# Patient Record
Sex: FEMALE | Race: BLACK OR AFRICAN AMERICAN | Employment: OTHER | URBAN - METROPOLITAN AREA
[De-identification: names, ages, dates, MRNs, and addresses within clinical notes are randomized per-mention and may not be internally consistent; named-entity substitution may affect disease eponyms.]

---

## 2017-01-16 PROBLEM — K21.9 GASTROESOPHAGEAL REFLUX DISEASE WITHOUT ESOPHAGITIS: Status: ACTIVE | Noted: 2017-01-16

## 2017-01-16 PROBLEM — F41.9 ANXIETY: Status: ACTIVE | Noted: 2017-01-16

## 2017-01-16 PROBLEM — I27.20 PULMONARY HYPERTENSION (HCC): Status: ACTIVE | Noted: 2017-01-16

## 2017-01-16 PROBLEM — I51.7 LVH (LEFT VENTRICULAR HYPERTROPHY): Status: ACTIVE | Noted: 2017-01-16

## 2017-01-16 PROBLEM — I50.9 CONGESTIVE HEART FAILURE, NYHA CLASS 3 AND ACC/AHA STAGE C (HCC): Status: ACTIVE | Noted: 2017-01-16

## 2017-01-16 PROBLEM — I38 VALVULAR HEART DISEASE: Status: ACTIVE | Noted: 2017-01-16

## 2017-02-10 PROBLEM — E11.3299 BDR (BACKGROUND DIABETIC RETINOPATHY) (HCC): Status: ACTIVE | Noted: 2017-02-10

## 2017-02-14 PROBLEM — I51.7 LEFT ATRIAL DILATION: Status: ACTIVE | Noted: 2017-02-14

## 2017-02-14 PROBLEM — R80.9 ALBUMINURIA: Status: ACTIVE | Noted: 2017-02-14

## 2017-03-03 PROBLEM — M17.0 PRIMARY OSTEOARTHRITIS OF BOTH KNEES: Status: ACTIVE | Noted: 2017-03-03

## 2017-05-09 PROBLEM — R15.9 FECAL INCONTINENCE: Status: ACTIVE | Noted: 2017-05-09

## 2017-05-11 PROBLEM — M15.9 PRIMARY OSTEOARTHRITIS INVOLVING MULTIPLE JOINTS: Status: ACTIVE | Noted: 2017-05-11

## 2017-08-22 PROBLEM — E11.9 DIABETES MELLITUS, TYPE II, INSULIN DEPENDENT (HCC): Status: ACTIVE | Noted: 2017-08-22

## 2017-08-22 PROBLEM — N39.0 UTI (URINARY TRACT INFECTION): Status: ACTIVE | Noted: 2017-08-22

## 2017-08-22 PROBLEM — R42 DIZZINESS: Status: ACTIVE | Noted: 2017-08-22

## 2017-08-22 PROBLEM — Z79.4 DIABETES MELLITUS, TYPE II, INSULIN DEPENDENT (HCC): Status: ACTIVE | Noted: 2017-08-22

## 2017-08-22 PROBLEM — I50.9 CHF, ACUTE ON CHRONIC (HCC): Status: ACTIVE | Noted: 2017-08-22

## 2017-08-22 PROBLEM — N18.9 CHRONIC KIDNEY DISEASE: Status: ACTIVE | Noted: 2017-08-22

## 2017-08-22 PROBLEM — Z87.09 HISTORY OF ASTHMA: Status: ACTIVE | Noted: 2017-08-22

## 2018-02-06 PROBLEM — R06.09 DYSPNEA ON EXERTION: Status: ACTIVE | Noted: 2018-02-06

## 2018-02-06 PROBLEM — I50.43 CHF (CONGESTIVE HEART FAILURE), NYHA CLASS I, ACUTE ON CHRONIC, COMBINED (HCC): Status: ACTIVE | Noted: 2018-02-06

## 2018-03-13 ENCOUNTER — TELEPHONE (OUTPATIENT)
Dept: ENT CLINIC | Age: 73
End: 2018-03-13

## 2018-03-14 ENCOUNTER — CARE COORDINATION (OUTPATIENT)
Dept: CARE COORDINATION | Age: 73
End: 2018-03-14

## 2018-03-22 ENCOUNTER — CARE COORDINATION (OUTPATIENT)
Dept: CARE COORDINATION | Age: 73
End: 2018-03-22

## 2018-03-26 ENCOUNTER — HOSPITAL ENCOUNTER (OUTPATIENT)
Dept: OTHER | Age: 73
Setting detail: THERAPIES SERIES
Discharge: HOME | End: 2018-03-26
Payer: COMMERCIAL

## 2018-03-26 VITALS
RESPIRATION RATE: 18 BRPM | DIASTOLIC BLOOD PRESSURE: 56 MMHG | SYSTOLIC BLOOD PRESSURE: 126 MMHG | HEART RATE: 88 BPM | WEIGHT: 230 LBS | BODY MASS INDEX: 40.1 KG/M2

## 2018-03-26 LAB
ANION GAP SERPL CALCULATED.3IONS-SCNC: 14 MMOL/L (ref 7–16)
BUN BLDV-MCNC: 106 MG/DL (ref 8–23)
CALCIUM SERPL-MCNC: 9.3 MG/DL (ref 8.6–10.2)
CHLORIDE BLD-SCNC: 98 MMOL/L (ref 98–107)
CO2: 23 MMOL/L (ref 22–29)
CREAT SERPL-MCNC: 4.5 MG/DL (ref 0.5–1)
GFR AFRICAN AMERICAN: 12
GFR NON-AFRICAN AMERICAN: 12 ML/MIN/1.73
GLUCOSE BLD-MCNC: 252 MG/DL (ref 74–109)
POTASSIUM SERPL-SCNC: 6.2 MMOL/L (ref 3.5–5)
PRO-BNP: 1763 PG/ML (ref 0–125)
SODIUM BLD-SCNC: 135 MMOL/L (ref 132–146)

## 2018-03-26 PROCEDURE — 36415 COLL VENOUS BLD VENIPUNCTURE: CPT

## 2018-03-26 PROCEDURE — 83880 ASSAY OF NATRIURETIC PEPTIDE: CPT

## 2018-03-26 PROCEDURE — 99204 OFFICE O/P NEW MOD 45 MIN: CPT

## 2018-03-26 PROCEDURE — 80048 BASIC METABOLIC PNL TOTAL CA: CPT

## 2018-03-27 ENCOUNTER — TELEPHONE (OUTPATIENT)
Dept: CARDIOLOGY CLINIC | Age: 73
End: 2018-03-27

## 2018-03-27 ENCOUNTER — OFFICE VISIT (OUTPATIENT)
Dept: CARDIOLOGY CLINIC | Age: 73
End: 2018-03-27
Payer: COMMERCIAL

## 2018-03-27 VITALS
HEIGHT: 63 IN | HEART RATE: 88 BPM | DIASTOLIC BLOOD PRESSURE: 60 MMHG | WEIGHT: 230 LBS | OXYGEN SATURATION: 97 % | RESPIRATION RATE: 12 BRPM | BODY MASS INDEX: 40.75 KG/M2 | SYSTOLIC BLOOD PRESSURE: 126 MMHG

## 2018-03-27 DIAGNOSIS — E87.5 HYPERKALEMIA: Primary | ICD-10-CM

## 2018-03-27 DIAGNOSIS — I48.20 CHRONIC ATRIAL FIBRILLATION (HCC): Chronic | ICD-10-CM

## 2018-03-27 DIAGNOSIS — I25.10 CORONARY ARTERY DISEASE INVOLVING NATIVE CORONARY ARTERY OF NATIVE HEART WITHOUT ANGINA PECTORIS: Chronic | ICD-10-CM

## 2018-03-27 PROCEDURE — G8417 CALC BMI ABV UP PARAM F/U: HCPCS | Performed by: INTERNAL MEDICINE

## 2018-03-27 PROCEDURE — 1036F TOBACCO NON-USER: CPT | Performed by: INTERNAL MEDICINE

## 2018-03-27 PROCEDURE — 3017F COLORECTAL CA SCREEN DOC REV: CPT | Performed by: INTERNAL MEDICINE

## 2018-03-27 PROCEDURE — 1123F ACP DISCUSS/DSCN MKR DOCD: CPT | Performed by: INTERNAL MEDICINE

## 2018-03-27 PROCEDURE — G8482 FLU IMMUNIZE ORDER/ADMIN: HCPCS | Performed by: INTERNAL MEDICINE

## 2018-03-27 PROCEDURE — G8427 DOCREV CUR MEDS BY ELIG CLIN: HCPCS | Performed by: INTERNAL MEDICINE

## 2018-03-27 PROCEDURE — 93000 ELECTROCARDIOGRAM COMPLETE: CPT | Performed by: INTERNAL MEDICINE

## 2018-03-27 PROCEDURE — G8399 PT W/DXA RESULTS DOCUMENT: HCPCS | Performed by: INTERNAL MEDICINE

## 2018-03-27 PROCEDURE — 99214 OFFICE O/P EST MOD 30 MIN: CPT | Performed by: INTERNAL MEDICINE

## 2018-03-27 PROCEDURE — 4040F PNEUMOC VAC/ADMIN/RCVD: CPT | Performed by: INTERNAL MEDICINE

## 2018-03-27 PROCEDURE — 3014F SCREEN MAMMO DOC REV: CPT | Performed by: INTERNAL MEDICINE

## 2018-03-27 PROCEDURE — G8598 ASA/ANTIPLAT THER USED: HCPCS | Performed by: INTERNAL MEDICINE

## 2018-03-27 PROCEDURE — 1090F PRES/ABSN URINE INCON ASSESS: CPT | Performed by: INTERNAL MEDICINE

## 2018-03-27 RX ORDER — MAGNESIUM HYDROXIDE/ALUMINUM HYDROXICE/SIMETHICONE 120; 1200; 1200 MG/30ML; MG/30ML; MG/30ML
5 SUSPENSION ORAL EVERY 6 HOURS PRN
COMMUNITY
End: 2019-04-05

## 2018-03-27 RX ORDER — ZOLPIDEM TARTRATE 5 MG/1
5 TABLET ORAL NIGHTLY PRN
COMMUNITY
End: 2019-01-03

## 2018-03-28 ENCOUNTER — TELEPHONE (OUTPATIENT)
Dept: CARDIOLOGY CLINIC | Age: 73
End: 2018-03-28

## 2018-03-28 NOTE — TELEPHONE ENCOUNTER
Dena Simon 8/[birth year]1815 [id number] was seen in [facility] [place] [facility] office. Diuretics held. 1. Discontinue bumetanide. 2. Discontinue spironolactone. To be seen in CHF Clinic Thursday for [facility] lab draw and call office with results. Client was instructed to increase fluids to 2-3 liters daily. Last labs as follows:   Results for Pedro Garrison (MRN [id number]) as of 3/28/2018 10:43   Ref.  Range 3/8/2018 13:10 3/26/2018 13:00   Sodium Latest Ref Range: 132 - 146 mmol/L 132 135   Potassium Latest Ref Range: 3.5 - 5.0 mmol/L 5.7 (H) 6.2 (H)   Chloride Latest Ref Range: 98 - 107 mmol/L 94 (L) 98   CO2 Latest Ref Range: 22 - 29 mmol/L 23 23   BUN Latest Ref Range: 8 - 23 mg/dL 87 (H) 106 (H)   Creatinine Latest Ref Range: 0.5 - 1.0 mg/dL 3.0 (H) 4.5 (H)   Anion Gap Latest Ref Range: 7 - 16 mmol/L 15 14   GFR Non- Latest Ref Range: >=60 mL/min/1.73 18 12   GFR  Unknown 18 12   Glucose Latest Ref Range: 74 - 109 mg/dL 391 (H) 252 (H)   Calcium Latest Ref Range: 8.6 - 10.2 mg/dL 9.3 9.3   Pro-BNP Latest Ref Range: 0 - 125 pg/mL 4,507 (H) 1,763 (H)

## 2018-03-29 ENCOUNTER — CARE COORDINATION (OUTPATIENT)
Dept: CARE COORDINATION | Age: 73
End: 2018-03-29

## 2018-04-02 ENCOUNTER — OFFICE VISIT (OUTPATIENT)
Dept: CARDIOLOGY CLINIC | Age: 73
End: 2018-04-02
Payer: COMMERCIAL

## 2018-04-02 VITALS
WEIGHT: 236 LBS | DIASTOLIC BLOOD PRESSURE: 70 MMHG | RESPIRATION RATE: 18 BRPM | BODY MASS INDEX: 40.29 KG/M2 | OXYGEN SATURATION: 99 % | HEART RATE: 91 BPM | SYSTOLIC BLOOD PRESSURE: 118 MMHG | HEIGHT: 64 IN

## 2018-04-02 DIAGNOSIS — I10 ESSENTIAL HYPERTENSION: ICD-10-CM

## 2018-04-02 DIAGNOSIS — R42 DIZZINESS: ICD-10-CM

## 2018-04-02 DIAGNOSIS — I25.10 CORONARY ARTERY DISEASE INVOLVING NATIVE HEART WITHOUT ANGINA PECTORIS, UNSPECIFIED VESSEL OR LESION TYPE: ICD-10-CM

## 2018-04-02 DIAGNOSIS — N17.9 AKI (ACUTE KIDNEY INJURY) (HCC): Primary | ICD-10-CM

## 2018-04-02 DIAGNOSIS — I50.30 (HFPEF) HEART FAILURE WITH PRESERVED EJECTION FRACTION (HCC): ICD-10-CM

## 2018-04-02 PROCEDURE — 3014F SCREEN MAMMO DOC REV: CPT | Performed by: INTERNAL MEDICINE

## 2018-04-02 PROCEDURE — G8598 ASA/ANTIPLAT THER USED: HCPCS | Performed by: INTERNAL MEDICINE

## 2018-04-02 PROCEDURE — 1090F PRES/ABSN URINE INCON ASSESS: CPT | Performed by: INTERNAL MEDICINE

## 2018-04-02 PROCEDURE — 1036F TOBACCO NON-USER: CPT | Performed by: INTERNAL MEDICINE

## 2018-04-02 PROCEDURE — 99215 OFFICE O/P EST HI 40 MIN: CPT | Performed by: INTERNAL MEDICINE

## 2018-04-02 PROCEDURE — G8399 PT W/DXA RESULTS DOCUMENT: HCPCS | Performed by: INTERNAL MEDICINE

## 2018-04-02 PROCEDURE — 3017F COLORECTAL CA SCREEN DOC REV: CPT | Performed by: INTERNAL MEDICINE

## 2018-04-02 PROCEDURE — 1123F ACP DISCUSS/DSCN MKR DOCD: CPT | Performed by: INTERNAL MEDICINE

## 2018-04-02 PROCEDURE — G8427 DOCREV CUR MEDS BY ELIG CLIN: HCPCS | Performed by: INTERNAL MEDICINE

## 2018-04-02 PROCEDURE — 4040F PNEUMOC VAC/ADMIN/RCVD: CPT | Performed by: INTERNAL MEDICINE

## 2018-04-02 PROCEDURE — G8417 CALC BMI ABV UP PARAM F/U: HCPCS | Performed by: INTERNAL MEDICINE

## 2018-04-02 RX ORDER — MECLIZINE HCL 12.5 MG/1
12.5 TABLET ORAL 3 TIMES DAILY PRN
COMMUNITY
End: 2018-04-02

## 2018-04-02 RX ORDER — SACCHAROMYCES BOULARDII 250 MG
250 CAPSULE ORAL DAILY
COMMUNITY
End: 2019-04-15

## 2018-04-02 RX ORDER — HYDRALAZINE HYDROCHLORIDE 50 MG/1
TABLET, FILM COATED ORAL
Qty: 180 TABLET | Refills: 5 | Status: SHIPPED | OUTPATIENT
Start: 2018-04-02 | End: 2018-05-01 | Stop reason: SDUPTHER

## 2018-04-04 ENCOUNTER — CARE COORDINATION (OUTPATIENT)
Dept: CARE COORDINATION | Age: 73
End: 2018-04-04

## 2018-04-09 ENCOUNTER — HOSPITAL ENCOUNTER (OUTPATIENT)
Dept: ULTRASOUND IMAGING | Age: 73
Discharge: HOME | End: 2018-04-11
Payer: COMMERCIAL

## 2018-04-09 DIAGNOSIS — E04.1 THYROID NODULE: ICD-10-CM

## 2018-04-09 PROCEDURE — 76536 US EXAM OF HEAD AND NECK: CPT

## 2018-04-10 ENCOUNTER — HOSPITAL ENCOUNTER (OUTPATIENT)
Dept: OTHER | Age: 73
Discharge: HOME | End: 2018-04-10

## 2018-04-12 ENCOUNTER — CARE COORDINATION (OUTPATIENT)
Dept: CARE COORDINATION | Age: 73
End: 2018-04-12

## 2018-04-12 ENCOUNTER — HOSPITAL ENCOUNTER (OUTPATIENT)
Dept: OTHER | Age: 73
Setting detail: THERAPIES SERIES
Discharge: HOME | End: 2018-04-12
Payer: COMMERCIAL

## 2018-04-12 VITALS
SYSTOLIC BLOOD PRESSURE: 173 MMHG | BODY MASS INDEX: 42.63 KG/M2 | HEART RATE: 78 BPM | DIASTOLIC BLOOD PRESSURE: 85 MMHG | RESPIRATION RATE: 18 BRPM | WEIGHT: 244.5 LBS

## 2018-04-12 LAB
ANION GAP SERPL CALCULATED.3IONS-SCNC: 15 MMOL/L (ref 7–16)
BUN BLDV-MCNC: 23 MG/DL (ref 8–23)
CALCIUM SERPL-MCNC: 9 MG/DL (ref 8.6–10.2)
CHLORIDE BLD-SCNC: 107 MMOL/L (ref 98–107)
CO2: 23 MMOL/L (ref 22–29)
CREAT SERPL-MCNC: 1.7 MG/DL (ref 0.5–1)
GFR AFRICAN AMERICAN: 36
GFR NON-AFRICAN AMERICAN: 36 ML/MIN/1.73
GLUCOSE BLD-MCNC: 83 MG/DL (ref 74–109)
POTASSIUM SERPL-SCNC: 4.7 MMOL/L (ref 3.5–5)
PRO-BNP: 6324 PG/ML (ref 0–125)
SODIUM BLD-SCNC: 145 MMOL/L (ref 132–146)

## 2018-04-12 PROCEDURE — 99204 OFFICE O/P NEW MOD 45 MIN: CPT

## 2018-04-12 PROCEDURE — 80048 BASIC METABOLIC PNL TOTAL CA: CPT

## 2018-04-12 PROCEDURE — 83880 ASSAY OF NATRIURETIC PEPTIDE: CPT

## 2018-04-12 PROCEDURE — 36415 COLL VENOUS BLD VENIPUNCTURE: CPT

## 2018-04-16 ENCOUNTER — OFFICE VISIT (OUTPATIENT)
Dept: ENT CLINIC | Age: 73
End: 2018-04-16
Payer: COMMERCIAL

## 2018-04-16 VITALS
DIASTOLIC BLOOD PRESSURE: 72 MMHG | SYSTOLIC BLOOD PRESSURE: 127 MMHG | BODY MASS INDEX: 41.66 KG/M2 | WEIGHT: 244 LBS | OXYGEN SATURATION: 99 % | HEART RATE: 102 BPM | HEIGHT: 64 IN

## 2018-04-16 DIAGNOSIS — E04.1 THYROID NODULE: Primary | ICD-10-CM

## 2018-04-16 PROCEDURE — 1036F TOBACCO NON-USER: CPT | Performed by: OTOLARYNGOLOGY

## 2018-04-16 PROCEDURE — G8417 CALC BMI ABV UP PARAM F/U: HCPCS | Performed by: OTOLARYNGOLOGY

## 2018-04-16 PROCEDURE — 1123F ACP DISCUSS/DSCN MKR DOCD: CPT | Performed by: OTOLARYNGOLOGY

## 2018-04-16 PROCEDURE — 99214 OFFICE O/P EST MOD 30 MIN: CPT | Performed by: OTOLARYNGOLOGY

## 2018-04-16 PROCEDURE — 3017F COLORECTAL CA SCREEN DOC REV: CPT | Performed by: OTOLARYNGOLOGY

## 2018-04-16 PROCEDURE — G8598 ASA/ANTIPLAT THER USED: HCPCS | Performed by: OTOLARYNGOLOGY

## 2018-04-16 PROCEDURE — 1090F PRES/ABSN URINE INCON ASSESS: CPT | Performed by: OTOLARYNGOLOGY

## 2018-04-16 PROCEDURE — G8399 PT W/DXA RESULTS DOCUMENT: HCPCS | Performed by: OTOLARYNGOLOGY

## 2018-04-16 PROCEDURE — G8427 DOCREV CUR MEDS BY ELIG CLIN: HCPCS | Performed by: OTOLARYNGOLOGY

## 2018-04-16 PROCEDURE — 4040F PNEUMOC VAC/ADMIN/RCVD: CPT | Performed by: OTOLARYNGOLOGY

## 2018-04-18 ENCOUNTER — CARE COORDINATION (OUTPATIENT)
Dept: CARE COORDINATION | Age: 73
End: 2018-04-18

## 2018-04-18 ENCOUNTER — TELEPHONE (OUTPATIENT)
Dept: CARDIOLOGY CLINIC | Age: 73
End: 2018-04-18

## 2018-04-18 DIAGNOSIS — I50.23 ACUTE ON CHRONIC SYSTOLIC CONGESTIVE HEART FAILURE (HCC): Primary | ICD-10-CM

## 2018-04-18 RX ORDER — BUMETANIDE 2 MG/1
2 TABLET ORAL DAILY
Qty: 30 TABLET | Refills: 3
Start: 2018-04-18 | End: 2019-02-15 | Stop reason: SDUPTHER

## 2018-04-23 ENCOUNTER — HOSPITAL ENCOUNTER (OUTPATIENT)
Dept: OTHER | Age: 73
Setting detail: THERAPIES SERIES
Discharge: HOME | End: 2018-04-23
Payer: COMMERCIAL

## 2018-04-23 VITALS
RESPIRATION RATE: 18 BRPM | SYSTOLIC BLOOD PRESSURE: 174 MMHG | WEIGHT: 242 LBS | DIASTOLIC BLOOD PRESSURE: 87 MMHG | HEART RATE: 88 BPM | BODY MASS INDEX: 42.2 KG/M2

## 2018-04-23 LAB
ANION GAP SERPL CALCULATED.3IONS-SCNC: 11 MMOL/L (ref 7–16)
BUN BLDV-MCNC: 20 MG/DL (ref 8–23)
CALCIUM SERPL-MCNC: 8.7 MG/DL (ref 8.6–10.2)
CHLORIDE BLD-SCNC: 104 MMOL/L (ref 98–107)
CO2: 29 MMOL/L (ref 22–29)
CREAT SERPL-MCNC: 1.5 MG/DL (ref 0.5–1)
GFR AFRICAN AMERICAN: 41
GFR NON-AFRICAN AMERICAN: 41 ML/MIN/1.73
GLUCOSE BLD-MCNC: 197 MG/DL (ref 74–109)
POTASSIUM SERPL-SCNC: 4.7 MMOL/L (ref 3.5–5)
PRO-BNP: 4629 PG/ML (ref 0–125)
SODIUM BLD-SCNC: 144 MMOL/L (ref 132–146)

## 2018-04-23 PROCEDURE — 83880 ASSAY OF NATRIURETIC PEPTIDE: CPT

## 2018-04-23 PROCEDURE — 80048 BASIC METABOLIC PNL TOTAL CA: CPT

## 2018-04-23 PROCEDURE — 36415 COLL VENOUS BLD VENIPUNCTURE: CPT

## 2018-04-23 PROCEDURE — 99214 OFFICE O/P EST MOD 30 MIN: CPT

## 2018-04-24 ENCOUNTER — TELEPHONE (OUTPATIENT)
Dept: CARDIOLOGY CLINIC | Age: 73
End: 2018-04-24

## 2018-04-27 ASSESSMENT — ENCOUNTER SYMPTOMS
DOUBLE VISION: 0
SORE THROAT: 0
SHORTNESS OF BREATH: 0
VOMITING: 0
SINUS PAIN: 0
BLURRED VISION: 0
COUGH: 0
HEARTBURN: 0

## 2018-05-01 ENCOUNTER — OFFICE VISIT (OUTPATIENT)
Dept: CARDIOLOGY CLINIC | Age: 73
End: 2018-05-01
Payer: COMMERCIAL

## 2018-05-01 VITALS
HEIGHT: 64 IN | WEIGHT: 238.8 LBS | SYSTOLIC BLOOD PRESSURE: 120 MMHG | BODY MASS INDEX: 40.77 KG/M2 | DIASTOLIC BLOOD PRESSURE: 64 MMHG | RESPIRATION RATE: 16 BRPM | HEART RATE: 76 BPM | OXYGEN SATURATION: 97 %

## 2018-05-01 DIAGNOSIS — I50.32 CHRONIC DIASTOLIC HEART FAILURE (HCC): Primary | ICD-10-CM

## 2018-05-01 PROCEDURE — 99214 OFFICE O/P EST MOD 30 MIN: CPT | Performed by: INTERNAL MEDICINE

## 2018-05-01 PROCEDURE — 1123F ACP DISCUSS/DSCN MKR DOCD: CPT | Performed by: INTERNAL MEDICINE

## 2018-05-01 PROCEDURE — 4040F PNEUMOC VAC/ADMIN/RCVD: CPT | Performed by: INTERNAL MEDICINE

## 2018-05-01 PROCEDURE — 3017F COLORECTAL CA SCREEN DOC REV: CPT | Performed by: INTERNAL MEDICINE

## 2018-05-01 PROCEDURE — G8598 ASA/ANTIPLAT THER USED: HCPCS | Performed by: INTERNAL MEDICINE

## 2018-05-01 PROCEDURE — G8399 PT W/DXA RESULTS DOCUMENT: HCPCS | Performed by: INTERNAL MEDICINE

## 2018-05-01 PROCEDURE — G8417 CALC BMI ABV UP PARAM F/U: HCPCS | Performed by: INTERNAL MEDICINE

## 2018-05-01 PROCEDURE — G8427 DOCREV CUR MEDS BY ELIG CLIN: HCPCS | Performed by: INTERNAL MEDICINE

## 2018-05-01 PROCEDURE — 1090F PRES/ABSN URINE INCON ASSESS: CPT | Performed by: INTERNAL MEDICINE

## 2018-05-01 PROCEDURE — 1036F TOBACCO NON-USER: CPT | Performed by: INTERNAL MEDICINE

## 2018-05-01 RX ORDER — DOCUSATE SODIUM 100 MG/1
100 CAPSULE, LIQUID FILLED ORAL DAILY PRN
COMMUNITY
End: 2019-04-15

## 2018-05-01 RX ORDER — HYDRALAZINE HYDROCHLORIDE 50 MG/1
50 TABLET, FILM COATED ORAL 3 TIMES DAILY
Qty: 180 TABLET | Refills: 5 | Status: SHIPPED | OUTPATIENT
Start: 2018-05-01 | End: 2018-08-01 | Stop reason: ALTCHOICE

## 2018-05-10 ENCOUNTER — HOSPITAL ENCOUNTER (OUTPATIENT)
Dept: OTHER | Age: 73
Setting detail: THERAPIES SERIES
Discharge: HOME | End: 2018-05-10
Payer: MEDICARE

## 2018-05-10 VITALS
HEART RATE: 90 BPM | BODY MASS INDEX: 41.15 KG/M2 | DIASTOLIC BLOOD PRESSURE: 76 MMHG | WEIGHT: 236 LBS | RESPIRATION RATE: 18 BRPM | SYSTOLIC BLOOD PRESSURE: 131 MMHG

## 2018-05-10 LAB
ANION GAP SERPL CALCULATED.3IONS-SCNC: 12 MMOL/L (ref 7–16)
BUN BLDV-MCNC: 27 MG/DL (ref 8–23)
CALCIUM SERPL-MCNC: 9.2 MG/DL (ref 8.6–10.2)
CHLORIDE BLD-SCNC: 103 MMOL/L (ref 98–107)
CO2: 29 MMOL/L (ref 22–29)
CREAT SERPL-MCNC: 1.6 MG/DL (ref 0.5–1)
GFR AFRICAN AMERICAN: 38
GFR NON-AFRICAN AMERICAN: 38 ML/MIN/1.73
GLUCOSE BLD-MCNC: 161 MG/DL (ref 74–109)
POTASSIUM SERPL-SCNC: 4.3 MMOL/L (ref 3.5–5)
PRO-BNP: 9337 PG/ML (ref 0–125)
SODIUM BLD-SCNC: 144 MMOL/L (ref 132–146)

## 2018-05-10 PROCEDURE — 83880 ASSAY OF NATRIURETIC PEPTIDE: CPT

## 2018-05-10 PROCEDURE — 80048 BASIC METABOLIC PNL TOTAL CA: CPT

## 2018-05-10 PROCEDURE — 36415 COLL VENOUS BLD VENIPUNCTURE: CPT

## 2018-05-10 PROCEDURE — 99214 OFFICE O/P EST MOD 30 MIN: CPT

## 2018-05-11 ENCOUNTER — TELEPHONE (OUTPATIENT)
Dept: CARDIOLOGY CLINIC | Age: 73
End: 2018-05-11

## 2018-05-16 ENCOUNTER — TELEPHONE (OUTPATIENT)
Dept: CARDIOLOGY CLINIC | Age: 73
End: 2018-05-16

## 2018-05-16 ENCOUNTER — HOSPITAL ENCOUNTER (OUTPATIENT)
Dept: OTHER | Age: 73
Setting detail: THERAPIES SERIES
Discharge: HOME | End: 2018-05-16
Payer: MEDICARE

## 2018-05-16 VITALS
SYSTOLIC BLOOD PRESSURE: 134 MMHG | RESPIRATION RATE: 18 BRPM | DIASTOLIC BLOOD PRESSURE: 64 MMHG | WEIGHT: 238 LBS | BODY MASS INDEX: 41.5 KG/M2 | HEART RATE: 89 BPM

## 2018-05-16 LAB
ANION GAP SERPL CALCULATED.3IONS-SCNC: 13 MMOL/L (ref 7–16)
BUN BLDV-MCNC: 28 MG/DL (ref 8–23)
CALCIUM SERPL-MCNC: 8.8 MG/DL (ref 8.6–10.2)
CHLORIDE BLD-SCNC: 102 MMOL/L (ref 98–107)
CO2: 26 MMOL/L (ref 22–29)
CREAT SERPL-MCNC: 1.5 MG/DL (ref 0.5–1)
GFR AFRICAN AMERICAN: 41
GFR NON-AFRICAN AMERICAN: 41 ML/MIN/1.73
GLUCOSE BLD-MCNC: 106 MG/DL (ref 74–109)
POTASSIUM SERPL-SCNC: 3.8 MMOL/L (ref 3.5–5)
PRO-BNP: 2870 PG/ML (ref 0–125)
SODIUM BLD-SCNC: 141 MMOL/L (ref 132–146)

## 2018-05-16 PROCEDURE — 2580000003 HC RX 258: Performed by: INTERNAL MEDICINE

## 2018-05-16 PROCEDURE — 80048 BASIC METABOLIC PNL TOTAL CA: CPT

## 2018-05-16 PROCEDURE — 36415 COLL VENOUS BLD VENIPUNCTURE: CPT

## 2018-05-16 PROCEDURE — 83880 ASSAY OF NATRIURETIC PEPTIDE: CPT

## 2018-05-16 PROCEDURE — 2500000003 HC RX 250 WO HCPCS: Performed by: INTERNAL MEDICINE

## 2018-05-16 PROCEDURE — 96374 THER/PROPH/DIAG INJ IV PUSH: CPT

## 2018-05-16 PROCEDURE — 99214 OFFICE O/P EST MOD 30 MIN: CPT

## 2018-05-16 RX ORDER — BUMETANIDE 0.25 MG/ML
1 INJECTION, SOLUTION INTRAMUSCULAR; INTRAVENOUS ONCE
Status: COMPLETED | OUTPATIENT
Start: 2018-05-16 | End: 2018-05-16

## 2018-05-16 RX ORDER — SODIUM CHLORIDE 0.9 % (FLUSH) 0.9 %
10 SYRINGE (ML) INJECTION PRN
Status: DISCONTINUED | OUTPATIENT
Start: 2018-05-16 | End: 2018-05-17 | Stop reason: HOSPADM

## 2018-05-16 RX ADMIN — Medication 10 ML: at 09:39

## 2018-05-16 RX ADMIN — BUMETANIDE 1 MG: 0.25 INJECTION INTRAMUSCULAR; INTRAVENOUS at 09:39

## 2018-05-18 ENCOUNTER — HOSPITAL ENCOUNTER (OUTPATIENT)
Dept: CARDIAC REHAB | Age: 73
Setting detail: THERAPIES SERIES
Discharge: HOME | End: 2018-05-18
Payer: MEDICARE

## 2018-05-18 VITALS
DIASTOLIC BLOOD PRESSURE: 89 MMHG | SYSTOLIC BLOOD PRESSURE: 147 MMHG | RESPIRATION RATE: 20 BRPM | HEIGHT: 64 IN | BODY MASS INDEX: 40.63 KG/M2 | WEIGHT: 238 LBS | HEART RATE: 76 BPM

## 2018-05-23 ENCOUNTER — HOSPITAL ENCOUNTER (OUTPATIENT)
Dept: CARDIAC REHAB | Age: 73
Setting detail: THERAPIES SERIES
Discharge: HOME | End: 2018-05-23
Payer: MEDICARE

## 2018-05-23 PROCEDURE — 93798 PHYS/QHP OP CAR RHAB W/ECG: CPT

## 2018-05-24 ENCOUNTER — HOSPITAL ENCOUNTER (OUTPATIENT)
Dept: OTHER | Age: 73
Setting detail: THERAPIES SERIES
Discharge: HOME | End: 2018-05-24
Payer: MEDICARE

## 2018-05-24 VITALS
DIASTOLIC BLOOD PRESSURE: 58 MMHG | BODY MASS INDEX: 41.32 KG/M2 | RESPIRATION RATE: 18 BRPM | SYSTOLIC BLOOD PRESSURE: 132 MMHG | HEART RATE: 77 BPM | WEIGHT: 237 LBS

## 2018-05-24 LAB
ANION GAP SERPL CALCULATED.3IONS-SCNC: 17 MMOL/L (ref 7–16)
BUN BLDV-MCNC: 28 MG/DL (ref 8–23)
CALCIUM SERPL-MCNC: 8.8 MG/DL (ref 8.6–10.2)
CHLORIDE BLD-SCNC: 106 MMOL/L (ref 98–107)
CO2: 21 MMOL/L (ref 22–29)
CREAT SERPL-MCNC: 1.6 MG/DL (ref 0.5–1)
GFR AFRICAN AMERICAN: 38
GFR NON-AFRICAN AMERICAN: 38 ML/MIN/1.73
GLUCOSE BLD-MCNC: 89 MG/DL (ref 74–109)
POTASSIUM SERPL-SCNC: 4.3 MMOL/L (ref 3.5–5)
PRO-BNP: 2155 PG/ML (ref 0–125)
SODIUM BLD-SCNC: 144 MMOL/L (ref 132–146)

## 2018-05-24 PROCEDURE — 2580000003 HC RX 258: Performed by: INTERNAL MEDICINE

## 2018-05-24 PROCEDURE — 96374 THER/PROPH/DIAG INJ IV PUSH: CPT

## 2018-05-24 PROCEDURE — 36415 COLL VENOUS BLD VENIPUNCTURE: CPT

## 2018-05-24 PROCEDURE — 2500000003 HC RX 250 WO HCPCS: Performed by: INTERNAL MEDICINE

## 2018-05-24 PROCEDURE — 6360000002 HC RX W HCPCS: Performed by: INTERNAL MEDICINE

## 2018-05-24 PROCEDURE — 80048 BASIC METABOLIC PNL TOTAL CA: CPT

## 2018-05-24 PROCEDURE — 99214 OFFICE O/P EST MOD 30 MIN: CPT

## 2018-05-24 PROCEDURE — 83880 ASSAY OF NATRIURETIC PEPTIDE: CPT

## 2018-05-24 RX ORDER — BUMETANIDE 0.25 MG/ML
1 INJECTION, SOLUTION INTRAMUSCULAR; INTRAVENOUS ONCE
Status: COMPLETED | OUTPATIENT
Start: 2018-05-24 | End: 2018-05-24

## 2018-05-24 RX ORDER — SODIUM CHLORIDE 0.9 % (FLUSH) 0.9 %
10 SYRINGE (ML) INJECTION PRN
Status: DISCONTINUED | OUTPATIENT
Start: 2018-05-24 | End: 2018-05-25 | Stop reason: HOSPADM

## 2018-05-24 RX ORDER — FUROSEMIDE 10 MG/ML
40 INJECTION INTRAMUSCULAR; INTRAVENOUS ONCE
Status: COMPLETED | OUTPATIENT
Start: 2018-05-24 | End: 2018-05-24

## 2018-05-24 RX ADMIN — Medication 10 ML: at 12:23

## 2018-05-24 RX ADMIN — FUROSEMIDE 40 MG: 10 INJECTION, SOLUTION INTRAMUSCULAR; INTRAVENOUS at 12:23

## 2018-05-24 RX ADMIN — BUMETANIDE 1 MG: 0.25 INJECTION INTRAMUSCULAR; INTRAVENOUS at 12:26

## 2018-05-25 ENCOUNTER — HOSPITAL ENCOUNTER (OUTPATIENT)
Dept: CARDIAC REHAB | Age: 73
Setting detail: THERAPIES SERIES
Discharge: HOME | End: 2018-05-25
Payer: MEDICARE

## 2018-05-25 PROCEDURE — 93798 PHYS/QHP OP CAR RHAB W/ECG: CPT

## 2018-05-30 ENCOUNTER — HOSPITAL ENCOUNTER (OUTPATIENT)
Dept: CARDIAC REHAB | Age: 73
Setting detail: THERAPIES SERIES
Discharge: HOME | End: 2018-05-30
Payer: MEDICARE

## 2018-05-30 PROCEDURE — 93798 PHYS/QHP OP CAR RHAB W/ECG: CPT

## 2018-06-01 ENCOUNTER — HOSPITAL ENCOUNTER (OUTPATIENT)
Dept: CARDIAC REHAB | Age: 73
Setting detail: THERAPIES SERIES
Discharge: HOME | End: 2018-06-01
Payer: MEDICARE

## 2018-06-01 PROCEDURE — 93798 PHYS/QHP OP CAR RHAB W/ECG: CPT

## 2018-06-04 ENCOUNTER — HOSPITAL ENCOUNTER (OUTPATIENT)
Dept: CARDIAC REHAB | Age: 73
Setting detail: THERAPIES SERIES
Discharge: HOME | End: 2018-06-04
Payer: MEDICARE

## 2018-06-04 PROCEDURE — 93798 PHYS/QHP OP CAR RHAB W/ECG: CPT

## 2018-06-06 ENCOUNTER — HOSPITAL ENCOUNTER (OUTPATIENT)
Dept: OTHER | Age: 73
Setting detail: THERAPIES SERIES
Discharge: HOME | End: 2018-06-06
Payer: MEDICARE

## 2018-06-08 ENCOUNTER — HOSPITAL ENCOUNTER (OUTPATIENT)
Dept: CARDIAC REHAB | Age: 73
Setting detail: THERAPIES SERIES
Discharge: HOME | End: 2018-06-08
Payer: MEDICARE

## 2018-06-08 PROCEDURE — 93798 PHYS/QHP OP CAR RHAB W/ECG: CPT

## 2018-06-11 ENCOUNTER — HOSPITAL ENCOUNTER (OUTPATIENT)
Dept: CARDIAC REHAB | Age: 73
Setting detail: THERAPIES SERIES
Discharge: HOME | End: 2018-06-11
Payer: MEDICARE

## 2018-06-11 VITALS — WEIGHT: 237.4 LBS | HEIGHT: 64 IN | BODY MASS INDEX: 40.53 KG/M2

## 2018-06-11 PROCEDURE — 93798 PHYS/QHP OP CAR RHAB W/ECG: CPT

## 2018-06-11 NOTE — PROGRESS NOTES
Outpatient Dietitian Assessment  5/35/3423    Caryl Beltran 68 y.o. female    PCP:   Cody Tomas DO    Assessment:  Pt seen and educated; Patient reports she is currently at [facility] and difficulty walking;  does not like the food and orders out one time per week from fast food ( [employer] or [employer]was on fluid restriction in past but not currently;  daughter provides some fruit snacks keeps in room; pt goes to vending machine; appetite good; Pertinent Past Medical/Surgical History: Hypertension, CAD, diabetes, chronic afib, CKD, CHF, arthritis, valvular heart disease, diabetes, dyspnea on exertion, asthma, CABG X 4, stents x 3; no chewing/swallowing problems, allergic to eggs; stated [employer]; no [employer] food restrictions stated;   24 hour dietary recall done and is as followed:   Breakfast: 7:30 am ( at facility) 2 pieces deutsch, 1 mini waffle with butter and diet syrup, 2 oz 2% milk, 2 oz orange juice. AM snack: none  Lunch: none  PM Snack: none  Dinner: 5:00 pm ( take out delivered)  6 plain wings from [employer], 16 ounce diet coke. Evening snack: 7-8 pm ( at facility) 1 cookie and 1 bottle of powdered flavored water  ( adds orange, cherry or grape sugar free flavor packets to bottles water)  Or vending machine: 1 bag regular chips  Beverages:  2-4 ounces orange juice, 1 water pitcher (per shift offered )* intake varies  1 - 12 ounce can diet pop. Snacks: per facility rotation, see comments listed above  No coffee, drinks hot tea per facility; eats in room; has roommate; encouraged to go to food related activities. Ht Readings from Last 1 Encounters:   06/11/18 5' 3.5\" (1.613 m)     Wt Readings from Last 3 Encounters:   06/11/18 237 lb 6.4 oz (107.7 kg)   05/24/18 237 lb (107.5 kg)   05/18/18 238 lb (108 kg)     Body mass index is 41.39 kg/m². Diagnosis:  Obese/Overweight related to excessive fat intake of foods as evident BMI 41.39.      Intervention:  Meet estimated

## 2018-06-15 ENCOUNTER — HOSPITAL ENCOUNTER (OUTPATIENT)
Dept: CARDIAC REHAB | Age: 73
Setting detail: THERAPIES SERIES
Discharge: HOME | End: 2018-06-15
Payer: MEDICARE

## 2018-06-15 PROCEDURE — 93798 PHYS/QHP OP CAR RHAB W/ECG: CPT

## 2018-06-18 ENCOUNTER — HOSPITAL ENCOUNTER (OUTPATIENT)
Dept: CARDIAC REHAB | Age: 73
Setting detail: THERAPIES SERIES
Discharge: HOME | End: 2018-06-18
Payer: MEDICARE

## 2018-06-18 PROCEDURE — 93798 PHYS/QHP OP CAR RHAB W/ECG: CPT

## 2018-06-20 ENCOUNTER — HOSPITAL ENCOUNTER (OUTPATIENT)
Dept: CARDIAC REHAB | Age: 73
Setting detail: THERAPIES SERIES
Discharge: HOME | End: 2018-06-20
Payer: MEDICARE

## 2018-06-20 PROCEDURE — 93798 PHYS/QHP OP CAR RHAB W/ECG: CPT

## 2018-06-21 ENCOUNTER — OFFICE VISIT (OUTPATIENT)
Dept: CARDIOLOGY CLINIC | Age: 73
End: 2018-06-21
Payer: MEDICARE

## 2018-06-21 VITALS
RESPIRATION RATE: 24 BRPM | BODY MASS INDEX: 40.63 KG/M2 | OXYGEN SATURATION: 98 % | SYSTOLIC BLOOD PRESSURE: 112 MMHG | WEIGHT: 238 LBS | HEIGHT: 64 IN | HEART RATE: 87 BPM | DIASTOLIC BLOOD PRESSURE: 58 MMHG

## 2018-06-21 DIAGNOSIS — E66.01 MORBID OBESITY (HCC): ICD-10-CM

## 2018-06-21 DIAGNOSIS — E11.9 DIABETES MELLITUS, TYPE II, INSULIN DEPENDENT (HCC): ICD-10-CM

## 2018-06-21 DIAGNOSIS — Z79.4 DIABETES MELLITUS, TYPE II, INSULIN DEPENDENT (HCC): ICD-10-CM

## 2018-06-21 DIAGNOSIS — I50.33 ACUTE ON CHRONIC DIASTOLIC HEART FAILURE (HCC): Primary | ICD-10-CM

## 2018-06-21 DIAGNOSIS — N18.9 CHRONIC KIDNEY DISEASE, UNSPECIFIED CKD STAGE: ICD-10-CM

## 2018-06-21 DIAGNOSIS — I10 HYPERTENSION, UNSPECIFIED TYPE: ICD-10-CM

## 2018-06-21 DIAGNOSIS — I48.20 CHRONIC ATRIAL FIBRILLATION (HCC): Chronic | ICD-10-CM

## 2018-06-21 DIAGNOSIS — I38 VALVULAR HEART DISEASE: ICD-10-CM

## 2018-06-21 DIAGNOSIS — R06.09 DYSPNEA ON EXERTION: ICD-10-CM

## 2018-06-21 DIAGNOSIS — M19.90 ARTHRITIS: ICD-10-CM

## 2018-06-21 DIAGNOSIS — I34.0 NON-RHEUMATIC MITRAL REGURGITATION: ICD-10-CM

## 2018-06-21 DIAGNOSIS — I25.10 CORONARY ARTERY DISEASE INVOLVING NATIVE CORONARY ARTERY OF NATIVE HEART WITHOUT ANGINA PECTORIS: ICD-10-CM

## 2018-06-21 PROCEDURE — 93000 ELECTROCARDIOGRAM COMPLETE: CPT | Performed by: INTERNAL MEDICINE

## 2018-06-21 PROCEDURE — 1090F PRES/ABSN URINE INCON ASSESS: CPT | Performed by: NURSE PRACTITIONER

## 2018-06-21 PROCEDURE — 3017F COLORECTAL CA SCREEN DOC REV: CPT | Performed by: NURSE PRACTITIONER

## 2018-06-21 PROCEDURE — G8427 DOCREV CUR MEDS BY ELIG CLIN: HCPCS | Performed by: NURSE PRACTITIONER

## 2018-06-21 PROCEDURE — 1036F TOBACCO NON-USER: CPT | Performed by: NURSE PRACTITIONER

## 2018-06-21 PROCEDURE — G8598 ASA/ANTIPLAT THER USED: HCPCS | Performed by: NURSE PRACTITIONER

## 2018-06-21 PROCEDURE — 2022F DILAT RTA XM EVC RTNOPTHY: CPT | Performed by: NURSE PRACTITIONER

## 2018-06-21 PROCEDURE — G8399 PT W/DXA RESULTS DOCUMENT: HCPCS | Performed by: NURSE PRACTITIONER

## 2018-06-21 PROCEDURE — G8417 CALC BMI ABV UP PARAM F/U: HCPCS | Performed by: NURSE PRACTITIONER

## 2018-06-21 PROCEDURE — 1123F ACP DISCUSS/DSCN MKR DOCD: CPT | Performed by: NURSE PRACTITIONER

## 2018-06-21 PROCEDURE — 4040F PNEUMOC VAC/ADMIN/RCVD: CPT | Performed by: NURSE PRACTITIONER

## 2018-06-21 PROCEDURE — 99214 OFFICE O/P EST MOD 30 MIN: CPT | Performed by: NURSE PRACTITIONER

## 2018-06-21 PROCEDURE — 3045F PR MOST RECENT HEMOGLOBIN A1C LEVEL 7.0-9.0%: CPT | Performed by: NURSE PRACTITIONER

## 2018-06-21 RX ORDER — BENZONATATE 100 MG/1
100 CAPSULE ORAL 3 TIMES DAILY PRN
COMMUNITY
End: 2019-04-05

## 2018-06-22 ENCOUNTER — HOSPITAL ENCOUNTER (OUTPATIENT)
Dept: OTHER | Age: 73
Setting detail: THERAPIES SERIES
Discharge: HOME | End: 2018-06-22
Payer: MEDICARE

## 2018-06-22 ENCOUNTER — HOSPITAL ENCOUNTER (OUTPATIENT)
Dept: CARDIAC REHAB | Age: 73
Setting detail: THERAPIES SERIES
Discharge: HOME | End: 2018-06-22
Payer: MEDICARE

## 2018-06-22 LAB
ANION GAP SERPL CALCULATED.3IONS-SCNC: 18 MMOL/L (ref 7–16)
BUN BLDV-MCNC: 45 MG/DL (ref 8–23)
CALCIUM SERPL-MCNC: 8.8 MG/DL (ref 8.6–10.2)
CHLORIDE BLD-SCNC: 102 MMOL/L (ref 98–107)
CO2: 23 MMOL/L (ref 22–29)
CREAT SERPL-MCNC: 2 MG/DL (ref 0.5–1)
GFR AFRICAN AMERICAN: 30
GFR NON-AFRICAN AMERICAN: 30 ML/MIN/1.73
GLUCOSE BLD-MCNC: 130 MG/DL (ref 74–109)
POTASSIUM SERPL-SCNC: 4.3 MMOL/L (ref 3.5–5)
PRO-BNP: 2130 PG/ML (ref 0–125)
SODIUM BLD-SCNC: 143 MMOL/L (ref 132–146)

## 2018-06-22 PROCEDURE — 96374 THER/PROPH/DIAG INJ IV PUSH: CPT

## 2018-06-22 PROCEDURE — 36415 COLL VENOUS BLD VENIPUNCTURE: CPT

## 2018-06-22 PROCEDURE — 99214 OFFICE O/P EST MOD 30 MIN: CPT

## 2018-06-22 PROCEDURE — 2580000003 HC RX 258: Performed by: INTERNAL MEDICINE

## 2018-06-22 PROCEDURE — 80048 BASIC METABOLIC PNL TOTAL CA: CPT

## 2018-06-22 PROCEDURE — 83880 ASSAY OF NATRIURETIC PEPTIDE: CPT

## 2018-06-22 PROCEDURE — 93798 PHYS/QHP OP CAR RHAB W/ECG: CPT

## 2018-06-22 PROCEDURE — 2500000003 HC RX 250 WO HCPCS: Performed by: INTERNAL MEDICINE

## 2018-06-22 RX ORDER — BUMETANIDE 0.25 MG/ML
1 INJECTION, SOLUTION INTRAMUSCULAR; INTRAVENOUS ONCE
Status: COMPLETED | OUTPATIENT
Start: 2018-06-22 | End: 2018-06-22

## 2018-06-22 RX ORDER — SODIUM CHLORIDE 0.9 % (FLUSH) 0.9 %
10 SYRINGE (ML) INJECTION PRN
Status: DISCONTINUED | OUTPATIENT
Start: 2018-06-22 | End: 2018-06-23 | Stop reason: HOSPADM

## 2018-06-22 RX ADMIN — BUMETANIDE 1 MG: 0.25 INJECTION INTRAMUSCULAR; INTRAVENOUS at 14:05

## 2018-06-22 RX ADMIN — Medication 10 ML: at 14:05

## 2018-06-25 ENCOUNTER — HOSPITAL ENCOUNTER (OUTPATIENT)
Dept: CARDIAC REHAB | Age: 73
Setting detail: THERAPIES SERIES
Discharge: HOME | End: 2018-06-25
Payer: MEDICARE

## 2018-06-25 PROCEDURE — 93798 PHYS/QHP OP CAR RHAB W/ECG: CPT

## 2018-06-29 ENCOUNTER — HOSPITAL ENCOUNTER (OUTPATIENT)
Dept: OTHER | Age: 73
Setting detail: THERAPIES SERIES
Discharge: HOME | End: 2018-06-29
Payer: MEDICARE

## 2018-06-29 ENCOUNTER — HOSPITAL ENCOUNTER (OUTPATIENT)
Dept: CARDIAC REHAB | Age: 73
Setting detail: THERAPIES SERIES
Discharge: HOME | End: 2018-06-29
Payer: MEDICARE

## 2018-06-29 PROCEDURE — 93798 PHYS/QHP OP CAR RHAB W/ECG: CPT

## 2018-06-29 RX ORDER — BUMETANIDE 0.25 MG/ML
2 INJECTION, SOLUTION INTRAMUSCULAR; INTRAVENOUS ONCE
Status: DISCONTINUED | OUTPATIENT
Start: 2018-06-29 | End: 2018-07-14 | Stop reason: HOSPADM

## 2018-06-29 RX ORDER — SODIUM CHLORIDE 0.9 % (FLUSH) 0.9 %
10 SYRINGE (ML) INJECTION PRN
Status: DISCONTINUED | OUTPATIENT
Start: 2018-06-29 | End: 2018-07-14 | Stop reason: HOSPADM

## 2018-07-02 ENCOUNTER — HOSPITAL ENCOUNTER (OUTPATIENT)
Dept: CARDIAC REHAB | Age: 73
Setting detail: THERAPIES SERIES
Discharge: HOME | End: 2018-07-02
Payer: MEDICARE

## 2018-07-02 PROCEDURE — 93798 PHYS/QHP OP CAR RHAB W/ECG: CPT

## 2018-07-06 ENCOUNTER — HOSPITAL ENCOUNTER (OUTPATIENT)
Dept: CARDIAC REHAB | Age: 73
Setting detail: THERAPIES SERIES
Discharge: HOME | End: 2018-07-06
Payer: MEDICARE

## 2018-07-06 PROCEDURE — 93798 PHYS/QHP OP CAR RHAB W/ECG: CPT

## 2018-07-09 ENCOUNTER — TELEPHONE (OUTPATIENT)
Dept: CARDIOLOGY CLINIC | Age: 73
End: 2018-07-09

## 2018-07-09 ENCOUNTER — HOSPITAL ENCOUNTER (OUTPATIENT)
Dept: CARDIAC REHAB | Age: 73
Setting detail: THERAPIES SERIES
Discharge: HOME | End: 2018-07-09
Payer: MEDICARE

## 2018-07-09 PROCEDURE — 93798 PHYS/QHP OP CAR RHAB W/ECG: CPT

## 2018-07-13 ENCOUNTER — HOSPITAL ENCOUNTER (OUTPATIENT)
Dept: CARDIAC REHAB | Age: 73
Setting detail: THERAPIES SERIES
Discharge: HOME | End: 2018-07-13
Payer: MEDICARE

## 2018-07-13 PROCEDURE — 93798 PHYS/QHP OP CAR RHAB W/ECG: CPT

## 2018-07-16 ENCOUNTER — HOSPITAL ENCOUNTER (OUTPATIENT)
Dept: CARDIAC REHAB | Age: 73
Setting detail: THERAPIES SERIES
Discharge: HOME | End: 2018-07-16
Payer: MEDICARE

## 2018-07-16 PROCEDURE — 93798 PHYS/QHP OP CAR RHAB W/ECG: CPT

## 2018-07-18 ENCOUNTER — HOSPITAL ENCOUNTER (OUTPATIENT)
Dept: CARDIAC REHAB | Age: 73
Setting detail: THERAPIES SERIES
Discharge: HOME | End: 2018-07-18
Payer: MEDICARE

## 2018-07-18 PROCEDURE — 93798 PHYS/QHP OP CAR RHAB W/ECG: CPT

## 2018-07-23 ENCOUNTER — HOSPITAL ENCOUNTER (OUTPATIENT)
Dept: CARDIAC REHAB | Age: 73
Setting detail: THERAPIES SERIES
Discharge: HOME | End: 2018-07-23
Payer: MEDICARE

## 2018-07-23 PROCEDURE — 93798 PHYS/QHP OP CAR RHAB W/ECG: CPT

## 2018-07-24 ENCOUNTER — OFFICE VISIT (OUTPATIENT)
Dept: CARDIOLOGY CLINIC | Age: 73
End: 2018-07-24
Payer: MEDICARE

## 2018-07-24 VITALS
HEIGHT: 64 IN | RESPIRATION RATE: 16 BRPM | BODY MASS INDEX: 40.84 KG/M2 | DIASTOLIC BLOOD PRESSURE: 60 MMHG | OXYGEN SATURATION: 95 % | SYSTOLIC BLOOD PRESSURE: 120 MMHG | HEART RATE: 90 BPM | WEIGHT: 239.2 LBS

## 2018-07-24 DIAGNOSIS — I48.20 CHRONIC ATRIAL FIBRILLATION (HCC): Primary | Chronic | ICD-10-CM

## 2018-07-24 DIAGNOSIS — I50.23 ACUTE ON CHRONIC SYSTOLIC CONGESTIVE HEART FAILURE (HCC): ICD-10-CM

## 2018-07-24 DIAGNOSIS — I27.20 PULMONARY HYPERTENSION (HCC): ICD-10-CM

## 2018-07-24 PROCEDURE — G8399 PT W/DXA RESULTS DOCUMENT: HCPCS | Performed by: INTERNAL MEDICINE

## 2018-07-24 PROCEDURE — 1036F TOBACCO NON-USER: CPT | Performed by: INTERNAL MEDICINE

## 2018-07-24 PROCEDURE — G8417 CALC BMI ABV UP PARAM F/U: HCPCS | Performed by: INTERNAL MEDICINE

## 2018-07-24 PROCEDURE — 1123F ACP DISCUSS/DSCN MKR DOCD: CPT | Performed by: INTERNAL MEDICINE

## 2018-07-24 PROCEDURE — 99214 OFFICE O/P EST MOD 30 MIN: CPT | Performed by: INTERNAL MEDICINE

## 2018-07-24 PROCEDURE — G8598 ASA/ANTIPLAT THER USED: HCPCS | Performed by: INTERNAL MEDICINE

## 2018-07-24 PROCEDURE — 1101F PT FALLS ASSESS-DOCD LE1/YR: CPT | Performed by: INTERNAL MEDICINE

## 2018-07-24 PROCEDURE — G8427 DOCREV CUR MEDS BY ELIG CLIN: HCPCS | Performed by: INTERNAL MEDICINE

## 2018-07-24 PROCEDURE — 93000 ELECTROCARDIOGRAM COMPLETE: CPT | Performed by: INTERNAL MEDICINE

## 2018-07-24 PROCEDURE — 1090F PRES/ABSN URINE INCON ASSESS: CPT | Performed by: INTERNAL MEDICINE

## 2018-07-24 PROCEDURE — 4040F PNEUMOC VAC/ADMIN/RCVD: CPT | Performed by: INTERNAL MEDICINE

## 2018-07-24 PROCEDURE — 3017F COLORECTAL CA SCREEN DOC REV: CPT | Performed by: INTERNAL MEDICINE

## 2018-07-24 NOTE — PATIENT INSTRUCTIONS
1. Take another bumex 2 mg today      2. Diet should sodium restricted to 2 grams, and 2-3 L of water. Again watch your daily weight trends and if you gain water   weight please follow above instructions. If you gain 3-5 pounds in 2-3 days OR notice that you are retaining fluid in anyway just like you did before then take an extra dose of your water pill (furosemide/Lasix OR bumetanide/Bumex) every day until you lose the weight or feel better. If you notice that you have taken more than 3 extra doses in 1 week then please call and let us know. If at any time you feel that you are retaining fluid, your medications are not working, or you feel ill in anyway, then please call us for either same day appointment or the next day, and for instructions. Our goal is to keep you out of the emergency room and the hospital and we have ways to do it. You just need to call us in a timely manner. If you become sick for other reasons, and notice that you are not urinating as much, the urine is very dark, you have significant diarrhea or vomiting, then please DO NOT take your water pill and CALL US immediately. Continue to weigh yourself on a regular basis. 3. Return visit in 3 months with Dr. Antonella Skinner or an NP      Patient Education        Heart Failure: Care Instructions  Your Care Instructions    Heart failure occurs when your heart does not pump as much blood as the body needs. Failure does not mean that the heart has stopped pumping but rather that it is not pumping as well as it should. Over time, this causes fluid buildup in your lungs and other parts of your body. Fluid buildup can cause shortness of breath, fatigue, swollen ankles, and other problems. By taking medicines regularly, reducing sodium (salt) in your diet, checking your weight every day, and making lifestyle changes, you can feel better and live longer. Follow-up care is a key part of your treatment and safety.  Be sure to make and go to all breathing.     · You cough up pink, foamy mucus.     · You have a new irregular or rapid heartbeat.    Call your doctor now or seek immediate medical care if:    · You have new or increased shortness of breath.     · You are dizzy or lightheaded, or you feel like you may faint.     · You have sudden weight gain, such as more than 2 to 3 pounds in a day or 5 pounds in a week. (Your doctor may suggest a different range of weight gain.)     · You have increased swelling in your legs, ankles, or feet.     · You are suddenly so tired or weak that you cannot do your usual activities.    Watch closely for changes in your health, and be sure to contact your doctor if you develop new symptoms. Where can you learn more? Go to https://Antria.e|tab. org and sign in to your [employer] account. Enter F863 in the [employer] box to learn more about \"Heart Failure: Care Instructions. \"     If you do not have an account, please click on the \"Sign Up Now\" link. Current as of: May 10, 2017  Content Version: 11.6  © 0335-5055 [employer]. Care instructions adapted under license by [facility] ([facility]). If you have questions about a medical condition or this instruction, always ask your healthcare professional. [address] any warranty or liability for your use of this information. Patient Education        Learning About Heart Failure Zones  What are heart failure zones? Heart failure zones give you an easy way to see changes in your heart failure symptoms. They also tell you when you need to get help. Check every day to see which zone you are in. Green zone. You are doing well. This is where you want to be. · Your weight is stable. This means it is not going up or down. · You breathe easily. · You are sleeping well. You are able to lie flat without shortness of breath. · You can do your usual activities. Yellow zone. Be careful. Your symptoms are changing.  Call your more about \"Learning About Heart Failure Zones. \"     If you do not have an account, please click on the \"Sign Up Now\" link. Current as of: December 6, 2017  Content Version: 11.6  © 6771-9687 [employer]. Care instructions adapted under license by [facility] ([facility]). If you have questions about a medical condition or this instruction, always ask your healthcare professional. [address] any warranty or liability for your use of this information.

## 2018-07-24 NOTE — PROGRESS NOTES
Advanced Heart Failure and Pulmonary Hypertension Clinic  Follow up Visit         Reason for Visit: follow up for heart failure      Primary Cardiologist: Dr. [facility] Cardiologist : Dr. Leena Zhang. History of Present Illness: Salvador Maxwell is a pleasant 68year old with extensive cardiac history including heart failure with reduced ejection fraction; coronary artery disease; valvular heart disease; and chronic atrial fibrillation. Her activity tolerance is limited by diffuse arthritic pains, chronic back pain, and exertional dyspnea. She presents today in follow up. We have been seeing her closely for DARVIN due to over diuresis, with recovery in creatinine back to baseline and resumption of lower dose diuretics. She as been in a rehab facility as well has been following at cardiac rehab three times a week. We last saw her a month ago and she admitted to eating a lot of junk food out of the vending machine , and had had a recent 5 lb water weight gain that improved with bumetanide. She has also been seen as needed in the CHF infusion clinic. Today she presents with the same complaints of minimal reserve and shortness of breath with activities as slight as turning over in bed. Otherwise she denies orthopnea, PND, nocturnal cough or dyspnea. No abdominal distention. Good appetite, no weight change. No chest pain or pressure. No asymmetric edema. No neurological symptoms. She has not had any recent weight gain.          Patient Active Problem List    Diagnosis Date Noted    Morbid obesity ([address] 06/21/2018    CHF (congestive heart failure), NYHA class I, acute on chronic, combined ([address] 02/06/2018    Dyspnea on exertion 02/06/2018    CHF, acute on chronic ([address] 08/22/2017    History of asthma 08/22/2017    Dizziness 08/22/2017    UTI (urinary tract infection) 08/22/2017    Chronic kidney disease 08/22/2017    Diabetes mellitus, type II, insulin dependent ([address] 08/22/2017    Smoking status: Former Smoker     Packs/day: 0.30     Years: 35.00     Types: Cigarettes     Start date: 1961     Quit date:     Smokeless tobacco: Never Used      Comment: started at age 14-most smoked was 1 pack every 3 days and quit in 3/2013    Alcohol use No      Comment: rarely. drinks 2 glasses of  diet coke daily, occasional peach ice tea. 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 She had smoked for over 50 years, and smoked on average 1 pack every three days    She denies alcohol or illicit drug use     She lives with her daughter             Family History:     Mother  of breast cancer and was a diabetic    One sister with history of diabetes       Family History   Problem Relation Age of Onset    Breast Cancer Mother     Diabetes Mother     Stroke Maternal Grandmother     Diabetes Maternal Grandmother     Diabetes Sister          Allergies   Allergen Reactions    Iv [Iodides] Anaphylaxis     uncertain    Codeine     Cymbalta [Duloxetine Hcl] Swelling    Gabapentin Swelling    Hydrocodone     Morphine     Pradaxa [Dabigatran Etexilate Mesylate] Other (See Comments)     Bleeding episode    Oxycontin [Oxycodone Hcl] Anxiety    Penicillins Rash           Outpatient Prescriptions Marked as Taking for the 18 encounter (Office Visit) with Christy Doran MD   Medication Sig Dispense Refill    benzonatate (TESSALON) 100 MG capsule Take 100 mg by mouth 3 times daily as needed for Cough      docusate sodium (COLACE) 100 MG capsule Take 100 mg by mouth daily as needed for Constipation      hydrALAZINE (APRESOLINE) 50 MG tablet Take 1 tablet by mouth 3 times daily take 1 tablet three times a day 180 tablet 5    bumetanide (BUMEX) 2 MG tablet Take 1 tablet by mouth daily 30 tablet 3    saccharomyces boulardii (FLORASTOR) 250 MG capsule Take 250 mg by mouth daily probiotic      aluminum & magnesium hydroxide-simethicone (MYLANTA) 200-200-20 MG/5ML SUSP suspension Take 5 mLs by mouth every 6 hours as needed for Indigestion      zolpidem (AMBIEN) 5 MG tablet Take 5 mg by mouth nightly as needed for Sleep.  Cyanocobalamin 1000 MCG/ML KIT Inject as directed      loperamide (IMODIUM) 2 MG capsule Take 2 mg by mouth 4 times daily as needed for Diarrhea      famotidine (PEPCID) 20 MG tablet Take 20 mg by mouth 2 times daily      traMADol (ULTRAM) 50 MG tablet Take 50 mg by mouth every 8 hours as needed for Pain. Madelinleola Shook Azelastine-Fluticasone 137-50 MCG/ACT SUSP 1 Dose by Nasal route every 12 hours      GLUCOSAMINE SULFATE PO Take 500 mg by mouth daily      atorvastatin (LIPITOR) 40 MG tablet Take 40 mg by mouth daily      lisinopril (PRINIVIL;ZESTRIL) 20 MG tablet Take 1 tablet by mouth 2 times daily 180 tablet 3    carvedilol (COREG) 25 MG tablet Take 1 tablet by mouth 2 times daily (with meals) 180 tablet 4    vitamin D (CHOLECALCIFEROL) 1000 units TABS tablet Take 1 tablet by mouth daily (Patient taking differently: Take 5,000 Units by mouth daily ) 90 tablet 3    levothyroxine (LEVOTHROID) 75 MCG tablet Take 1 tablet by mouth daily (Patient taking differently: Take 75 mcg by mouth every morning ) 90 tablet 3    insulin glargine (LANTUS SOLOSTAR) 100 UNIT/ML injection pen Inject 40 Units into the skin nightly (Patient taking differently: Inject 55 Units into the skin nightly ) 5 Pen 3    insulin lispro (HUMALOG KWIKPEN) 100 UNIT/ML pen Inject 10 Units into the skin 3 times daily (before meals) Take 11 units before breakfast and lunch and 13 units before dinner (Patient taking differently: Inject 12 Units into the skin 3 times daily (before meals) 2/23/2018 noted 12 units sq daily .  And 10 units

## 2018-07-27 ENCOUNTER — HOSPITAL ENCOUNTER (OUTPATIENT)
Dept: CARDIAC REHAB | Age: 73
Setting detail: THERAPIES SERIES
Discharge: HOME | End: 2018-07-27
Payer: MEDICARE

## 2018-07-27 PROCEDURE — 93798 PHYS/QHP OP CAR RHAB W/ECG: CPT

## 2018-08-01 ENCOUNTER — OFFICE VISIT (OUTPATIENT)
Dept: NEUROLOGY | Age: 73
End: 2018-08-01
Payer: MEDICARE

## 2018-08-01 VITALS
HEART RATE: 85 BPM | TEMPERATURE: 98 F | HEIGHT: 63 IN | OXYGEN SATURATION: 97 % | BODY MASS INDEX: 41.82 KG/M2 | SYSTOLIC BLOOD PRESSURE: 118 MMHG | DIASTOLIC BLOOD PRESSURE: 56 MMHG | WEIGHT: 236 LBS

## 2018-08-01 DIAGNOSIS — R42 MULTISENSORY DIZZINESS: Primary | ICD-10-CM

## 2018-08-01 PROCEDURE — 99214 OFFICE O/P EST MOD 30 MIN: CPT | Performed by: NURSE PRACTITIONER

## 2018-08-01 NOTE — PROGRESS NOTES
Nasal route every 12 hours    Historical Provider, MD   cholestyramine light (PREVALITE) 4 GM/DOSE powder Take 4 g by mouth daily  5/8/17   Historical Provider, MD     Objective:     BP (!) 118/56 (Site: Left Arm, Position: Sitting, Cuff Size: Large Adult)   Pulse 85   Temp 98 °F (36.7 °C)   Ht 5' 3\" (1.6 m)   Wt 236 lb (107 kg)   SpO2 97%   Breastfeeding?  No   BMI 41.81 kg/m²     General appearance: alert, appears younger than stated age, cooperative and in no distress----obese and in wheelchair today  Head: normocephalic; atraumatic  Eyes: sclerae clear  Neck: no carotid bruits  Lungs: diminsihed throughout but clear---SOB with minimal exertion  Heart: irregularly irregular rhythm  Extremities: vascular discoloration BLE, trace edema to b/l legs  Pulses: 1+ and symmetric   Skin:  color, texture, turgor normal--no rashes or lesions     Mental Status: alert and oriented x 4---cooperative    Appropriate attention/concentration  Intact memories  Intact fundus of knowledge    Speech: no dysarthria  Language: no aphasias    Cranial Nerves:  I: smell    II: visual acuity     II: visual fields Full    II: pupils JEREMIAH   III,VII: ptosis None   III,IV,VI: extraocular muscles  EOMI without nystagmus   V: mastication Normal   V: facial light touch sensation  Normal   V,VII: corneal reflex     VII: facial muscle function - upper  Normal   VII: facial muscle function - lower Normal   VIII: hearing Normal   IX: soft palate elevation  Normal   IX,X: gag reflex    XI: trapezius strength  5/5   XI: sternocleidomastoid strength 5/5   XI: neck extension strength  5/5   XII: tongue strength  Normal     Motor:  5/5 throughout  Spastic legs  No drift  No abnormal movements    Sensory:  LT intact in all limbs  Vibration moderately impaired at ankles    Coordination:   FN, FFM, EMILIO normal    Gait:  Moderate-marked Judson's present  Antalgic gait with minimal R foot drop again today----more confident with rollator  Very SOB with including HTN, HLD, CKD, COPD, obesity, and STACEY. She remains noncompliant with CPAP and was again educated on faithful use. She needs to lose weight, follow her cardiac diet, and exercise as tolerated. Fall precautions were reviewed with her. No additional neuro testing or follow up warranted unless new issues arise.     Plan:     No further neuro testing planned    Again recommend simplifying medications as able    Lifestyle modifications as above    See REGGIE Lugo, TESHAP-C  2:57 PM  8/1/2018

## 2018-08-03 ENCOUNTER — TELEPHONE (OUTPATIENT)
Dept: CARDIOLOGY CLINIC | Age: 73
End: 2018-08-03

## 2018-08-03 ENCOUNTER — HOSPITAL ENCOUNTER (OUTPATIENT)
Dept: OTHER | Age: 73
Setting detail: THERAPIES SERIES
Discharge: HOME | End: 2018-08-03
Payer: MEDICARE

## 2018-08-03 VITALS
HEART RATE: 64 BPM | RESPIRATION RATE: 90 BRPM | BODY MASS INDEX: 41.98 KG/M2 | DIASTOLIC BLOOD PRESSURE: 62 MMHG | SYSTOLIC BLOOD PRESSURE: 133 MMHG | WEIGHT: 237 LBS

## 2018-08-03 LAB
ANION GAP SERPL CALCULATED.3IONS-SCNC: 12 MMOL/L (ref 7–16)
BUN BLDV-MCNC: 24 MG/DL (ref 8–23)
CALCIUM SERPL-MCNC: 8.8 MG/DL (ref 8.6–10.2)
CHLORIDE BLD-SCNC: 104 MMOL/L (ref 98–107)
CO2: 26 MMOL/L (ref 22–29)
CREAT SERPL-MCNC: 1.6 MG/DL (ref 0.5–1)
GFR AFRICAN AMERICAN: 38
GFR NON-AFRICAN AMERICAN: 38 ML/MIN/1.73
GLUCOSE BLD-MCNC: 186 MG/DL (ref 74–109)
POTASSIUM SERPL-SCNC: 4.3 MMOL/L (ref 3.5–5)
PRO-BNP: 2369 PG/ML (ref 0–125)
SODIUM BLD-SCNC: 142 MMOL/L (ref 132–146)

## 2018-08-03 PROCEDURE — 36415 COLL VENOUS BLD VENIPUNCTURE: CPT

## 2018-08-03 PROCEDURE — 83880 ASSAY OF NATRIURETIC PEPTIDE: CPT

## 2018-08-03 PROCEDURE — 99214 OFFICE O/P EST MOD 30 MIN: CPT

## 2018-08-03 PROCEDURE — 96374 THER/PROPH/DIAG INJ IV PUSH: CPT

## 2018-08-03 PROCEDURE — 2580000003 HC RX 258: Performed by: INTERNAL MEDICINE

## 2018-08-03 PROCEDURE — 80048 BASIC METABOLIC PNL TOTAL CA: CPT

## 2018-08-03 PROCEDURE — 2500000003 HC RX 250 WO HCPCS: Performed by: INTERNAL MEDICINE

## 2018-08-03 RX ORDER — BUMETANIDE 0.25 MG/ML
1 INJECTION, SOLUTION INTRAMUSCULAR; INTRAVENOUS ONCE
Status: COMPLETED | OUTPATIENT
Start: 2018-08-03 | End: 2018-08-03

## 2018-08-03 RX ORDER — SODIUM CHLORIDE 0.9 % (FLUSH) 0.9 %
10 SYRINGE (ML) INJECTION PRN
Status: DISCONTINUED | OUTPATIENT
Start: 2018-08-03 | End: 2018-08-04 | Stop reason: HOSPADM

## 2018-08-03 RX ADMIN — BUMETANIDE 1 MG: 0.25 INJECTION INTRAMUSCULAR; INTRAVENOUS at 13:10

## 2018-08-03 RX ADMIN — BUMETANIDE 1 MG: 0.25 INJECTION INTRAMUSCULAR; INTRAVENOUS at 13:11

## 2018-08-03 RX ADMIN — Medication 10 ML: at 13:10

## 2018-08-06 ENCOUNTER — HOSPITAL ENCOUNTER (OUTPATIENT)
Dept: CARDIAC REHAB | Age: 73
Setting detail: THERAPIES SERIES
Discharge: HOME | End: 2018-08-06
Payer: MEDICARE

## 2018-08-06 PROCEDURE — 93798 PHYS/QHP OP CAR RHAB W/ECG: CPT

## 2018-08-08 ENCOUNTER — HOSPITAL ENCOUNTER (OUTPATIENT)
Dept: CARDIAC REHAB | Age: 73
Setting detail: THERAPIES SERIES
Discharge: HOME | End: 2018-08-08
Payer: MEDICARE

## 2018-08-08 PROCEDURE — 93798 PHYS/QHP OP CAR RHAB W/ECG: CPT

## 2018-08-10 ENCOUNTER — HOSPITAL ENCOUNTER (OUTPATIENT)
Dept: CARDIAC REHAB | Age: 73
Setting detail: THERAPIES SERIES
Discharge: HOME | End: 2018-08-10
Payer: MEDICARE

## 2018-08-10 PROCEDURE — 93798 PHYS/QHP OP CAR RHAB W/ECG: CPT

## 2018-08-15 ENCOUNTER — HOSPITAL ENCOUNTER (OUTPATIENT)
Dept: CARDIAC REHAB | Age: 73
Setting detail: THERAPIES SERIES
Discharge: HOME | End: 2018-08-15
Payer: MEDICARE

## 2018-08-15 PROCEDURE — 93798 PHYS/QHP OP CAR RHAB W/ECG: CPT

## 2018-08-17 ENCOUNTER — HOSPITAL ENCOUNTER (OUTPATIENT)
Dept: CARDIAC REHAB | Age: 73
Setting detail: THERAPIES SERIES
Discharge: HOME | End: 2018-08-17
Payer: MEDICARE

## 2018-08-17 PROCEDURE — 93797 PHYS/QHP OP CAR RHAB WO ECG: CPT

## 2018-08-17 PROCEDURE — 93798 PHYS/QHP OP CAR RHAB W/ECG: CPT

## 2018-08-20 ENCOUNTER — HOSPITAL ENCOUNTER (OUTPATIENT)
Dept: CARDIAC REHAB | Age: 73
Setting detail: THERAPIES SERIES
Discharge: HOME | End: 2018-08-20
Payer: MEDICARE

## 2018-08-20 PROCEDURE — 93798 PHYS/QHP OP CAR RHAB W/ECG: CPT

## 2018-08-22 ENCOUNTER — HOSPITAL ENCOUNTER (OUTPATIENT)
Dept: CARDIAC REHAB | Age: 73
Setting detail: THERAPIES SERIES
Discharge: HOME | End: 2018-08-22
Payer: MEDICARE

## 2018-08-22 PROCEDURE — 93798 PHYS/QHP OP CAR RHAB W/ECG: CPT

## 2018-08-24 ENCOUNTER — HOSPITAL ENCOUNTER (OUTPATIENT)
Dept: CARDIAC REHAB | Age: 73
Setting detail: THERAPIES SERIES
Discharge: HOME | End: 2018-08-24
Payer: MEDICARE

## 2018-08-24 ENCOUNTER — HOSPITAL ENCOUNTER (OUTPATIENT)
Dept: CARDIAC REHAB | Age: 73
Setting detail: THERAPIES SERIES
End: 2018-08-24
Payer: MEDICARE

## 2018-08-24 PROCEDURE — 93798 PHYS/QHP OP CAR RHAB W/ECG: CPT

## 2018-08-27 ENCOUNTER — HOSPITAL ENCOUNTER (OUTPATIENT)
Dept: CARDIAC REHAB | Age: 73
Setting detail: THERAPIES SERIES
Discharge: HOME | End: 2018-08-27
Payer: MEDICARE

## 2018-08-27 PROCEDURE — 93798 PHYS/QHP OP CAR RHAB W/ECG: CPT

## 2018-08-28 ENCOUNTER — HOSPITAL ENCOUNTER (OUTPATIENT)
Dept: CARDIOLOGY | Age: 73
Discharge: HOME | End: 2018-08-28
Admitting: SURGERY
Payer: MEDICARE

## 2018-08-28 ENCOUNTER — OFFICE VISIT (OUTPATIENT)
Dept: VASCULAR SURGERY | Age: 73
End: 2018-08-28
Payer: MEDICARE

## 2018-08-28 DIAGNOSIS — I65.23 BILATERAL CAROTID ARTERY STENOSIS: Primary | ICD-10-CM

## 2018-08-28 PROCEDURE — 1090F PRES/ABSN URINE INCON ASSESS: CPT | Performed by: SURGERY

## 2018-08-28 PROCEDURE — 4040F PNEUMOC VAC/ADMIN/RCVD: CPT | Performed by: SURGERY

## 2018-08-28 PROCEDURE — 1123F ACP DISCUSS/DSCN MKR DOCD: CPT | Performed by: SURGERY

## 2018-08-28 PROCEDURE — G8598 ASA/ANTIPLAT THER USED: HCPCS | Performed by: SURGERY

## 2018-08-28 PROCEDURE — 99213 OFFICE O/P EST LOW 20 MIN: CPT | Performed by: SURGERY

## 2018-08-28 PROCEDURE — G8417 CALC BMI ABV UP PARAM F/U: HCPCS | Performed by: SURGERY

## 2018-08-28 PROCEDURE — 93880 EXTRACRANIAL BILAT STUDY: CPT

## 2018-08-28 PROCEDURE — G8399 PT W/DXA RESULTS DOCUMENT: HCPCS | Performed by: SURGERY

## 2018-08-28 PROCEDURE — 3017F COLORECTAL CA SCREEN DOC REV: CPT | Performed by: SURGERY

## 2018-08-28 PROCEDURE — 1101F PT FALLS ASSESS-DOCD LE1/YR: CPT | Performed by: SURGERY

## 2018-08-28 PROCEDURE — G8427 DOCREV CUR MEDS BY ELIG CLIN: HCPCS | Performed by: SURGERY

## 2018-08-28 PROCEDURE — 1036F TOBACCO NON-USER: CPT | Performed by: SURGERY

## 2018-08-28 NOTE — PROGRESS NOTES
[facility] Heart & Vascular Lab - [facility]    This is a pre read worksheet - prior to official physician interpretation    Wilma Lagunas  [birth year]  Date of study: 8/28/18    Indication for study:  Carotid artery stenosis  Study : Bilateral Carotid Artery Duplex Examination    Duplex examination of the RIGHT carotid artery system identifies atherosclerotic plaque. The peak systolic velocity in internal carotid artery was 100 centimeters / second. The maximum end diastolic velocity was 18 centimeters / second. The ICA/CCA ratio is 0.7. The right vertebral artery has antegrade flow. Duplex examination of the LEFT carotid artery system identifies atherosclerotic plaque. The peak systolic velocity in internal carotid artery was 145 centimeters / second. The maximum end diastolic velocity was 27 centimeters / second. The ICA/CCA ratio is 1.4. The left vertebral artery has antegrade flow.       LAST STUDY  8/22/2017  Rt 0-40  Lt 50-69

## 2018-08-28 NOTE — PROGRESS NOTES
Brittany Galvez MD   albuterol sulfate  (90 Base) MCG/ACT inhaler Inhale 2 puffs into the lungs every 6 hours as needed for Wheezing 8/4/17  Yes Marah Aurora, DO   ipratropium-albuterol (DUONEB) 0.5-2.5 (3) MG/3ML SOLN nebulizer solution Inhale 3 mLs into the lungs every 4 hours 8/4/17  Yes Marah Aurora, DO   nitroGLYCERIN (NITROSTAT) 0.4 MG SL tablet Place 1 tablet under the tongue every 5 minutes as needed for Chest pain up to max of 3 total doses. If no relief after 1 dose, call 911. 8/4/17  Yes Marah Aurora, DO   apixaban (ELIQUIS) 5 MG TABS tablet Take 1 tablet by mouth 2 times daily 8/4/17  Yes Marah Aurora, DO   acetaminophen (TYLENOL) 500 MG tablet Take 1,000 mg by mouth every 6 hours as needed for Pain    Yes Historical Provider, MD   Misc. Devices (RAISED TOILET SEAT) MISC For OA involving multiple sites 5/11/17  Yes Albaro Adler MD   aspirin 81 MG tablet Take 81 mg by mouth every morning    Yes Historical Provider, MD   ondansetron (ZOFRAN) 4 MG tablet Take 2 mg by mouth every 8 hours as needed for Nausea or Vomiting    Historical Provider, MD   traMADol (ULTRAM) 50 MG tablet Take 50 mg by mouth every 8 hours as needed for Pain. Bigg Coleman Historical Provider, MD   Azelastine-Fluticasone 137-50 MCG/ACT SUSP 1 Dose by Nasal route every 12 hours    Historical Provider, MD   cholestyramine light (PREVALITE) 4 GM/DOSE powder Take 4 g by mouth daily  5/8/17   Historical Provider, MD     Allergies: Iv [iodides]; Codeine; Cymbalta [duloxetine hcl]; Gabapentin; Hydrocodone; Morphine; Pradaxa [dabigatran etexilate mesylate];  Oxycontin [oxycodone hcl]; and Penicillins    Social History     Social History    Marital status:      Spouse name: N/A    Number of children: 3    Years of education: N/A     Occupational History    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 Left 145 / 27 (1.4), 50-69%. Problem List Items Addressed This Visit     None      Visit Diagnoses     Bilateral carotid artery stenosis    -  Primary    Relevant Orders    VL DUP CAROTID BILATERAL          I reviewed with the patient that from my standpoint she can continue with all her normal activities. I will plan to see her again in two years but asked her to call sooner with any new problems. I reviewed the natural history and treatment options of carotid artery disease. I reviewed the signs and symptoms of stroke, and instructions if symptoms occur. Return in about 2 years (around 8/28/2020) for testing.

## 2018-08-29 ENCOUNTER — HOSPITAL ENCOUNTER (OUTPATIENT)
Dept: CARDIAC REHAB | Age: 73
Setting detail: THERAPIES SERIES
Discharge: HOME | End: 2018-08-29
Payer: MEDICARE

## 2018-08-29 PROCEDURE — 93798 PHYS/QHP OP CAR RHAB W/ECG: CPT

## 2018-08-31 ENCOUNTER — HOSPITAL ENCOUNTER (OUTPATIENT)
Dept: CARDIAC REHAB | Age: 73
Setting detail: THERAPIES SERIES
Discharge: HOME | End: 2018-08-31
Payer: MEDICARE

## 2018-08-31 PROCEDURE — 93798 PHYS/QHP OP CAR RHAB W/ECG: CPT

## 2018-09-05 ENCOUNTER — HOSPITAL ENCOUNTER (OUTPATIENT)
Dept: CARDIAC REHAB | Age: 73
Setting detail: THERAPIES SERIES
Discharge: HOME | End: 2018-09-05
Payer: MEDICARE

## 2018-09-05 ENCOUNTER — TELEPHONE (OUTPATIENT)
Dept: CARDIOLOGY CLINIC | Age: 73
End: 2018-09-05

## 2018-09-05 ENCOUNTER — HOSPITAL ENCOUNTER (OUTPATIENT)
Dept: OTHER | Age: 73
Setting detail: THERAPIES SERIES
Discharge: HOME | End: 2018-09-05
Payer: MEDICARE

## 2018-09-05 VITALS
HEART RATE: 80 BPM | RESPIRATION RATE: 18 BRPM | DIASTOLIC BLOOD PRESSURE: 67 MMHG | BODY MASS INDEX: 42.78 KG/M2 | SYSTOLIC BLOOD PRESSURE: 133 MMHG | WEIGHT: 241.5 LBS

## 2018-09-05 LAB
ANION GAP SERPL CALCULATED.3IONS-SCNC: 16 MMOL/L (ref 7–16)
BUN BLDV-MCNC: 26 MG/DL (ref 8–23)
CALCIUM SERPL-MCNC: 8.9 MG/DL (ref 8.6–10.2)
CHLORIDE BLD-SCNC: 104 MMOL/L (ref 98–107)
CO2: 24 MMOL/L (ref 22–29)
CREAT SERPL-MCNC: 1.6 MG/DL (ref 0.5–1)
GFR AFRICAN AMERICAN: 38
GFR NON-AFRICAN AMERICAN: 38 ML/MIN/1.73
GLUCOSE BLD-MCNC: 55 MG/DL (ref 74–109)
POTASSIUM SERPL-SCNC: 3.7 MMOL/L (ref 3.5–5)
PRO-BNP: 2910 PG/ML (ref 0–125)
SODIUM BLD-SCNC: 144 MMOL/L (ref 132–146)

## 2018-09-05 PROCEDURE — 96374 THER/PROPH/DIAG INJ IV PUSH: CPT

## 2018-09-05 PROCEDURE — 83880 ASSAY OF NATRIURETIC PEPTIDE: CPT

## 2018-09-05 PROCEDURE — 99214 OFFICE O/P EST MOD 30 MIN: CPT

## 2018-09-05 PROCEDURE — 36415 COLL VENOUS BLD VENIPUNCTURE: CPT

## 2018-09-05 PROCEDURE — 80048 BASIC METABOLIC PNL TOTAL CA: CPT

## 2018-09-05 PROCEDURE — 2500000003 HC RX 250 WO HCPCS: Performed by: INTERNAL MEDICINE

## 2018-09-05 PROCEDURE — 93798 PHYS/QHP OP CAR RHAB W/ECG: CPT

## 2018-09-05 PROCEDURE — 2580000003 HC RX 258: Performed by: INTERNAL MEDICINE

## 2018-09-05 RX ORDER — SODIUM CHLORIDE 0.9 % (FLUSH) 0.9 %
10 SYRINGE (ML) INJECTION 2 TIMES DAILY
Status: DISCONTINUED | OUTPATIENT
Start: 2018-09-05 | End: 2018-09-06 | Stop reason: HOSPADM

## 2018-09-05 RX ORDER — BUMETANIDE 0.25 MG/ML
2 INJECTION, SOLUTION INTRAMUSCULAR; INTRAVENOUS ONCE
Status: COMPLETED | OUTPATIENT
Start: 2018-09-05 | End: 2018-09-05

## 2018-09-05 RX ADMIN — BUMETANIDE 2 MG: 0.25 INJECTION INTRAMUSCULAR; INTRAVENOUS at 14:17

## 2018-09-05 RX ADMIN — Medication 10 ML: at 14:17

## 2018-09-07 ENCOUNTER — HOSPITAL ENCOUNTER (OUTPATIENT)
Dept: CARDIAC REHAB | Age: 73
Setting detail: THERAPIES SERIES
Discharge: HOME | End: 2018-09-07
Payer: MEDICARE

## 2018-09-07 PROCEDURE — 93798 PHYS/QHP OP CAR RHAB W/ECG: CPT

## 2018-09-10 ENCOUNTER — HOSPITAL ENCOUNTER (OUTPATIENT)
Dept: CARDIAC REHAB | Age: 73
Setting detail: THERAPIES SERIES
Discharge: HOME | End: 2018-09-10
Payer: MEDICARE

## 2018-09-10 PROCEDURE — 93798 PHYS/QHP OP CAR RHAB W/ECG: CPT

## 2018-09-12 ENCOUNTER — HOSPITAL ENCOUNTER (OUTPATIENT)
Dept: CARDIAC REHAB | Age: 73
Setting detail: THERAPIES SERIES
Discharge: HOME | End: 2018-09-12
Payer: MEDICARE

## 2018-09-12 ENCOUNTER — HOSPITAL ENCOUNTER (OUTPATIENT)
Dept: OTHER | Age: 73
Setting detail: THERAPIES SERIES
Discharge: HOME | End: 2018-09-12
Payer: MEDICARE

## 2018-09-12 PROCEDURE — 93798 PHYS/QHP OP CAR RHAB W/ECG: CPT

## 2018-09-14 ENCOUNTER — HOSPITAL ENCOUNTER (OUTPATIENT)
Dept: CARDIAC REHAB | Age: 73
Setting detail: THERAPIES SERIES
Discharge: HOME | End: 2018-09-14
Payer: MEDICARE

## 2018-09-14 PROCEDURE — 93798 PHYS/QHP OP CAR RHAB W/ECG: CPT

## 2018-09-26 PROBLEM — N39.0 UTI (URINARY TRACT INFECTION): Status: RESOLVED | Noted: 2017-08-22 | Resolved: 2018-09-26

## 2018-10-07 NOTE — PLAN OF CARE
Manuel Herring has completed 36/36 telemetry monitored sessions. Using a variety of cardiovascular equipment, she is able to sustain an average of 62 minutes, at 1.5 MET level of intensity, with propper hemodynamic response. She gained 8 lbs since beginning her exercise sessoins. A home exercise program handout was given and reviewed. Ambar Tesfaye plans to continue exercise at [facility]. Upon request, a detailed summary of her readings can be sent for your review. Please call [facility] at [phone]. Thank you for allowing us to care for your patient.

## 2018-10-10 ENCOUNTER — HOSPITAL ENCOUNTER (OUTPATIENT)
Dept: OTHER | Age: 73
Setting detail: THERAPIES SERIES
Discharge: HOME | End: 2018-10-10
Payer: MEDICARE

## 2018-10-10 VITALS
SYSTOLIC BLOOD PRESSURE: 168 MMHG | WEIGHT: 241 LBS | BODY MASS INDEX: 42.69 KG/M2 | RESPIRATION RATE: 18 BRPM | HEART RATE: 92 BPM | DIASTOLIC BLOOD PRESSURE: 78 MMHG

## 2018-10-10 LAB
ANION GAP SERPL CALCULATED.3IONS-SCNC: 14 MMOL/L (ref 7–16)
BUN BLDV-MCNC: 24 MG/DL (ref 8–23)
CALCIUM SERPL-MCNC: 9.1 MG/DL (ref 8.6–10.2)
CHLORIDE BLD-SCNC: 107 MMOL/L (ref 98–107)
CO2: 25 MMOL/L (ref 22–29)
CREAT SERPL-MCNC: 1.7 MG/DL (ref 0.5–1)
GFR AFRICAN AMERICAN: 36
GFR NON-AFRICAN AMERICAN: 36 ML/MIN/1.73
GLUCOSE BLD-MCNC: 55 MG/DL (ref 74–109)
POTASSIUM SERPL-SCNC: 3.9 MMOL/L (ref 3.5–5)
PRO-BNP: 5635 PG/ML (ref 0–125)
SODIUM BLD-SCNC: 146 MMOL/L (ref 132–146)

## 2018-10-10 PROCEDURE — 36415 COLL VENOUS BLD VENIPUNCTURE: CPT

## 2018-10-10 PROCEDURE — 80048 BASIC METABOLIC PNL TOTAL CA: CPT

## 2018-10-10 PROCEDURE — 2580000003 HC RX 258: Performed by: INTERNAL MEDICINE

## 2018-10-10 PROCEDURE — 96374 THER/PROPH/DIAG INJ IV PUSH: CPT

## 2018-10-10 PROCEDURE — 99214 OFFICE O/P EST MOD 30 MIN: CPT

## 2018-10-10 PROCEDURE — 2500000003 HC RX 250 WO HCPCS: Performed by: INTERNAL MEDICINE

## 2018-10-10 PROCEDURE — 83880 ASSAY OF NATRIURETIC PEPTIDE: CPT

## 2018-10-10 RX ORDER — SODIUM CHLORIDE 0.9 % (FLUSH) 0.9 %
10 SYRINGE (ML) INJECTION 2 TIMES DAILY
Status: DISCONTINUED | OUTPATIENT
Start: 2018-10-10 | End: 2018-10-11 | Stop reason: HOSPADM

## 2018-10-10 RX ORDER — BUMETANIDE 0.25 MG/ML
2 INJECTION, SOLUTION INTRAMUSCULAR; INTRAVENOUS ONCE
Status: COMPLETED | OUTPATIENT
Start: 2018-10-10 | End: 2018-10-10

## 2018-10-10 RX ADMIN — Medication 10 ML: at 13:18

## 2018-10-10 RX ADMIN — BUMETANIDE 2 MG: 0.25 INJECTION INTRAMUSCULAR; INTRAVENOUS at 13:18

## 2018-10-10 NOTE — PROGRESS NOTES
Pt. C/o feeling heavy, 1 plus edema lower extremities, breath sounds clear,denies any shortness of breath,, labs drawn and IV bumex given.

## 2018-10-11 ENCOUNTER — TELEPHONE (OUTPATIENT)
Dept: CARDIOLOGY CLINIC | Age: 73
End: 2018-10-11

## 2018-10-29 ENCOUNTER — OFFICE VISIT (OUTPATIENT)
Dept: CARDIOLOGY CLINIC | Age: 73
End: 2018-10-29
Payer: MEDICARE

## 2018-10-29 VITALS
HEIGHT: 63 IN | SYSTOLIC BLOOD PRESSURE: 120 MMHG | BODY MASS INDEX: 42.69 KG/M2 | RESPIRATION RATE: 16 BRPM | DIASTOLIC BLOOD PRESSURE: 70 MMHG | HEART RATE: 79 BPM

## 2018-10-29 DIAGNOSIS — I10 HYPERTENSION, UNSPECIFIED TYPE: Primary | Chronic | ICD-10-CM

## 2018-10-29 PROCEDURE — G8399 PT W/DXA RESULTS DOCUMENT: HCPCS | Performed by: INTERNAL MEDICINE

## 2018-10-29 PROCEDURE — 1123F ACP DISCUSS/DSCN MKR DOCD: CPT | Performed by: INTERNAL MEDICINE

## 2018-10-29 PROCEDURE — 93000 ELECTROCARDIOGRAM COMPLETE: CPT | Performed by: INTERNAL MEDICINE

## 2018-10-29 PROCEDURE — 1090F PRES/ABSN URINE INCON ASSESS: CPT | Performed by: INTERNAL MEDICINE

## 2018-10-29 PROCEDURE — 3017F COLORECTAL CA SCREEN DOC REV: CPT | Performed by: INTERNAL MEDICINE

## 2018-10-29 PROCEDURE — G8598 ASA/ANTIPLAT THER USED: HCPCS | Performed by: INTERNAL MEDICINE

## 2018-10-29 PROCEDURE — G8427 DOCREV CUR MEDS BY ELIG CLIN: HCPCS | Performed by: INTERNAL MEDICINE

## 2018-10-29 PROCEDURE — 1036F TOBACCO NON-USER: CPT | Performed by: INTERNAL MEDICINE

## 2018-10-29 PROCEDURE — G8417 CALC BMI ABV UP PARAM F/U: HCPCS | Performed by: INTERNAL MEDICINE

## 2018-10-29 PROCEDURE — 4040F PNEUMOC VAC/ADMIN/RCVD: CPT | Performed by: INTERNAL MEDICINE

## 2018-10-29 PROCEDURE — 1101F PT FALLS ASSESS-DOCD LE1/YR: CPT | Performed by: INTERNAL MEDICINE

## 2018-10-29 PROCEDURE — G8484 FLU IMMUNIZE NO ADMIN: HCPCS | Performed by: INTERNAL MEDICINE

## 2018-10-29 PROCEDURE — 99214 OFFICE O/P EST MOD 30 MIN: CPT | Performed by: INTERNAL MEDICINE

## 2018-10-29 RX ORDER — FERROUS SULFATE 325(65) MG
325 TABLET ORAL
COMMUNITY
End: 2019-04-15

## 2018-10-29 RX ORDER — BISACODYL 10 MG
10 SUPPOSITORY, RECTAL RECTAL DAILY
COMMUNITY
End: 2019-04-05

## 2018-10-29 RX ORDER — DIPHENHYDRAMINE HCL 25 MG
25 CAPSULE ORAL EVERY 6 HOURS PRN
COMMUNITY
End: 2019-04-15

## 2018-10-29 RX ORDER — POTASSIUM CHLORIDE 1.5 G/1.77G
20 POWDER, FOR SOLUTION ORAL 2 TIMES DAILY
COMMUNITY
End: 2019-01-03

## 2018-10-29 RX ORDER — HYDRALAZINE HYDROCHLORIDE 50 MG/1
50 TABLET, FILM COATED ORAL 3 TIMES DAILY
COMMUNITY
End: 2019-02-26 | Stop reason: SDUPTHER

## 2018-10-29 RX ORDER — LORATADINE 10 MG/1
10 TABLET ORAL DAILY
COMMUNITY
End: 2019-04-05

## 2018-10-29 NOTE — PROGRESS NOTES
stage C ([facility])    Left atrial dilation    Elevated plasma metanephrines    Albuminuria    Primary osteoarthritis of both knees    Dry eye    Hiatal hernia    Fecal incontinence    Primary osteoarthritis involving multiple joints    CHF, acute on chronic ([facility])    History of asthma    Dizziness    Chronic kidney disease    Diabetes mellitus, type II, insulin dependent ([facility])    CHF (congestive heart failure), NYHA class I, acute on chronic, combined ([facility])    Dyspnea on exertion    Morbid obesity ([facility])       Allergies   Allergen Reactions    Iv [Iodides] Anaphylaxis     uncertain    Codeine     Cymbalta [Duloxetine Hcl] Swelling    Gabapentin Swelling    Hydrocodone     Morphine     Pradaxa [Dabigatran Etexilate Mesylate] Other (See Comments)     Bleeding episode    Oxycontin [Oxycodone Hcl] Anxiety    Penicillins Rash       Current Outpatient Prescriptions   Medication Sig Dispense Refill    diphenhydrAMINE (BENADRYL) 25 MG capsule Take 25 mg by mouth every 6 hours as needed for Itching      loratadine (CLARITIN) 10 MG tablet Take 10 mg by mouth daily      bisacodyl (DULCOLAX) 10 MG suppository Place 10 mg rectally daily      ferrous sulfate 325 (65 Fe) MG tablet Take 325 mg by mouth daily (with breakfast)      hydrALAZINE (APRESOLINE) 50 MG tablet Take 50 mg by mouth 2 times daily      potassium chloride (KLOR-CON) 20 MEQ packet Take 20 mEq by mouth 2 times daily      benzonatate (TESSALON) 100 MG capsule Take 100 mg by mouth 3 times daily as needed for Cough      docusate sodium (COLACE) 100 MG capsule Take 100 mg by mouth daily as needed for Constipation      bumetanide (BUMEX) 2 MG tablet Take 1 tablet by mouth daily 30 tablet 3    saccharomyces boulardii (FLORASTOR) 250 MG capsule Take 250 mg by mouth daily probiotic      aluminum & magnesium hydroxide-simethicone (MYLANTA) 200-200-20 MG/5ML SUSP suspension Take 5 mLs by mouth every 6 hours as needed for Indigestion      Cyanocobalamin 1000 MCG/ML KIT Inject as directed      loperamide (IMODIUM) 2 MG capsule Take 2 mg by mouth 4 times daily as needed for Diarrhea      traMADol (ULTRAM) 50 MG tablet Take 50 mg by mouth every 8 hours as needed for Pain. Lennox Jose Alberto GLUCOSAMINE SULFATE PO Take 500 mg by mouth daily      Coenzyme Q10 (COQ-10 PO) Take 1 capsule by mouth daily       atorvastatin (LIPITOR) 40 MG tablet Take 40 mg by mouth daily      lisinopril (PRINIVIL;ZESTRIL) 20 MG tablet Take 1 tablet by mouth 2 times daily 180 tablet 3    carvedilol (COREG) 25 MG tablet Take 1 tablet by mouth 2 times daily (with meals) 180 tablet 4    vitamin D (CHOLECALCIFEROL) 1000 units TABS tablet Take 1 tablet by mouth daily (Patient taking differently: Take 5,000 Units by mouth daily ) 90 tablet 3    levothyroxine (LEVOTHROID) 75 MCG tablet Take 1 tablet by mouth daily (Patient taking differently: Take 75 mcg by mouth every morning ) 90 tablet 3    insulin glargine (LANTUS SOLOSTAR) 100 UNIT/ML injection pen Inject 40 Units into the skin nightly (Patient taking differently: Inject 52 Units into the skin nightly ) 5 Pen 3    insulin lispro (HUMALOG KWIKPEN) 100 UNIT/ML pen Inject 10 Units into the skin 3 times daily (before meals) Take 11 units before breakfast and lunch and 13 units before dinner (Patient taking differently: Inject 12 Units into the skin 2 times daily 2/23/2018 noted 12 units sq daily . And 10 units sq 2 x daily) 1 Pen 0    albuterol sulfate  (90 Base) MCG/ACT inhaler Inhale 2 puffs into the lungs every 6 hours as needed for Wheezing 3 Inhaler 4    ipratropium-albuterol (DUONEB) 0.5-2.5 (3) MG/3ML SOLN nebulizer solution Inhale 3 mLs into the lungs every 4 hours 810 mL 4    nitroGLYCERIN (NITROSTAT) 0.4 MG SL tablet Place 1 tablet under the tongue every 5 minutes as needed for Chest pain up to max of 3 total doses.  If no relief after 1 dose, call 911. 75 tablet 4    apixaban (ELIQUIS) 5 MG TABS tablet Take 1 rollator. She had smoked for over 50 years, and smoked on average 1 pack every three days    She denies alcohol or illicit drug use     She lives with her daughter             Family History: Mother  of breast cancer and was a diabetic    One sister with history of diabetes       Family History   Problem Relation Age of Onset   Guzman Backer Breast Cancer Mother     Diabetes Mother     Stroke Maternal Grandmother     Diabetes Maternal Grandmother     Diabetes Sister      Review of Systems:  Heart: as above   Lungs: as above   Eyes: denies changes in vision or discharge. Ears: denies changes in hearing or pain. Nose: denies epistaxis or masses   Throat: denies sore throat or trouble swallowing. Neuro: denies numbness, tingling, tremors. Skin: denies rashes or itching. : denies hematuria, dysuria   GI: denies vomiting, diarrhea   Psych: denies mood changed, anxiety, depression. All other systems negative. Physical Exam   /70   Pulse 79   Resp 16   Ht 5' 3\" (1.6 m)   BMI 42.69 kg/m²   Constitutional: Oriented to person, place, and time. Well-developed and well-nourished. No distress. Head: Normocephalic and atraumatic. Eyes: EOM are normal. Pupils are equal, round, and reactive to light. Neck: Normal range of motion. Neck supple. No hepatojugular reflux and no JVD present. Carotid bruit is not present. No tracheal deviation present. No thyromegaly present. Cardiovascular: Normal rate, irregular rhythm, CARMEN 2/6  Pulmonary/Chest: Effort normal and breath sounds normal. No respiratory distress. No wheezes. No rales. No tenderness. Abdominal: Soft. Bowel sounds are normal. No distension and no mass. No tenderness. No rebound and no guarding. Musculoskeletal: Normal range of motion. No edema and no tenderness. Lymphadenopathy:   No cervical adenopathy. No groin adenopathy. Neurological: Alert and oriented to person, place, and time. Skin: Skin is warm and dry. No rash noted. Not diaphoretic. No erythema. Psychiatric: Normal mood and affect. Behavior is normal.     EKG:  Afib, nonspecific ST and T waves changes.     Echo Summary 2/3/17:  Cinda Common left atrium is severely dilated.   Atrial fibrillation   Elevated L atrial pressure   Ejection fraction biplane = 47%.   The left atrium is severely dilated.   Atrial fibrillation   Elevated L atrial pressure   Moderate eccentric mitral regurgitation .   Pulmonary hypertension is moderate .   No pericardial effusion. Echo Summary 1/24/18:   Ejection fraction biplane =53%.   The left atrium is severely dilated.   Atrial fibrillation   Elevated L atrial pressure   Severe posteriorly directed eccentric mitral regurgitation.   Probable MV posterior leaflet restriction   The aortic valve appears mildly sclerotic.   No pericardial effusion.     ASSESSMENT AND PLAN:  Patient Active Problem List   Diagnosis    Acute on chronic systolic congestive heart failure (HCC)    Chronic atrial fibrillation (HCC)    HTN (hypertension)    Asthma    Hyperlipidemia    CAD (coronary artery disease)    CKD (chronic kidney disease) stage 3, GFR 30-59 ml/min (HCC)    Hypothyroidism    Anxiety    Gastroesophageal reflux disease without esophagitis    COPD (chronic obstructive pulmonary disease) ([address]    Diverticular disease    Vitamin D deficiency    Osteoporosis    STACEY (obstructive sleep apnea)    Peripheral neuropathy    Adenoma of left adrenal gland    Generalized OA    LVH (left ventricular hypertrophy)    Pulmonary hypertension (HCC)    Valvular heart disease    Congestive heart failure, NYHA class 3 and ACC/AHA stage C (HCC)    Left atrial dilation    Elevated plasma metanephrines    Albuminuria    Primary osteoarthritis of both knees    Dry eye    Hiatal hernia    Fecal incontinence    Primary osteoarthritis involving multiple joints    CHF, acute on chronic ([address]    History of asthma    Dizziness    Chronic kidney disease   

## 2018-10-30 ENCOUNTER — OFFICE VISIT (OUTPATIENT)
Dept: ORTHOPEDIC SURGERY | Age: 73
End: 2018-10-30
Payer: MEDICARE

## 2018-10-30 VITALS
HEIGHT: 64 IN | SYSTOLIC BLOOD PRESSURE: 138 MMHG | DIASTOLIC BLOOD PRESSURE: 78 MMHG | HEART RATE: 84 BPM | WEIGHT: 238 LBS | TEMPERATURE: 98.1 F | BODY MASS INDEX: 40.63 KG/M2

## 2018-10-30 DIAGNOSIS — M17.0 PRIMARY OSTEOARTHRITIS OF BOTH KNEES: Primary | ICD-10-CM

## 2018-10-30 PROCEDURE — G8399 PT W/DXA RESULTS DOCUMENT: HCPCS | Performed by: ORTHOPAEDIC SURGERY

## 2018-10-30 PROCEDURE — G8598 ASA/ANTIPLAT THER USED: HCPCS | Performed by: ORTHOPAEDIC SURGERY

## 2018-10-30 PROCEDURE — 1123F ACP DISCUSS/DSCN MKR DOCD: CPT | Performed by: ORTHOPAEDIC SURGERY

## 2018-10-30 PROCEDURE — 99213 OFFICE O/P EST LOW 20 MIN: CPT | Performed by: ORTHOPAEDIC SURGERY

## 2018-10-30 PROCEDURE — G8427 DOCREV CUR MEDS BY ELIG CLIN: HCPCS | Performed by: ORTHOPAEDIC SURGERY

## 2018-10-30 PROCEDURE — 1036F TOBACCO NON-USER: CPT | Performed by: ORTHOPAEDIC SURGERY

## 2018-10-30 PROCEDURE — 20610 DRAIN/INJ JOINT/BURSA W/O US: CPT | Performed by: ORTHOPAEDIC SURGERY

## 2018-10-30 PROCEDURE — 1090F PRES/ABSN URINE INCON ASSESS: CPT | Performed by: ORTHOPAEDIC SURGERY

## 2018-10-30 PROCEDURE — G8417 CALC BMI ABV UP PARAM F/U: HCPCS | Performed by: ORTHOPAEDIC SURGERY

## 2018-10-30 PROCEDURE — G8484 FLU IMMUNIZE NO ADMIN: HCPCS | Performed by: ORTHOPAEDIC SURGERY

## 2018-10-30 PROCEDURE — 4040F PNEUMOC VAC/ADMIN/RCVD: CPT | Performed by: ORTHOPAEDIC SURGERY

## 2018-10-30 PROCEDURE — 3017F COLORECTAL CA SCREEN DOC REV: CPT | Performed by: ORTHOPAEDIC SURGERY

## 2018-10-30 PROCEDURE — 1101F PT FALLS ASSESS-DOCD LE1/YR: CPT | Performed by: ORTHOPAEDIC SURGERY

## 2018-10-30 RX ORDER — BUPIVACAINE HYDROCHLORIDE 2.5 MG/ML
3 INJECTION, SOLUTION INFILTRATION; PERINEURAL ONCE
Status: COMPLETED | OUTPATIENT
Start: 2018-10-30 | End: 2018-10-30

## 2018-10-30 RX ORDER — TRIAMCINOLONE ACETONIDE 40 MG/ML
80 INJECTION, SUSPENSION INTRA-ARTICULAR; INTRAMUSCULAR ONCE
Status: COMPLETED | OUTPATIENT
Start: 2018-10-30 | End: 2018-10-30

## 2018-10-30 RX ADMIN — BUPIVACAINE HYDROCHLORIDE 7.5 MG: 2.5 INJECTION, SOLUTION INFILTRATION; PERINEURAL at 16:01

## 2018-10-30 RX ADMIN — TRIAMCINOLONE ACETONIDE 80 MG: 40 INJECTION, SUSPENSION INTRA-ARTICULAR; INTRAMUSCULAR at 16:02

## 2018-10-31 NOTE — PROGRESS NOTES
atorvastatin (LIPITOR) 40 MG tablet Take 40 mg by mouth daily      lisinopril (PRINIVIL;ZESTRIL) 20 MG tablet Take 1 tablet by mouth 2 times daily 180 tablet 3    carvedilol (COREG) 25 MG tablet Take 1 tablet by mouth 2 times daily (with meals) 180 tablet 4    vitamin D (CHOLECALCIFEROL) 1000 units TABS tablet Take 1 tablet by mouth daily (Patient taking differently: Take 5,000 Units by mouth daily ) 90 tablet 3    fluticasone (FLONASE) 50 MCG/ACT nasal spray 2 sprays by Nasal route every morning       levothyroxine (LEVOTHROID) 75 MCG tablet Take 1 tablet by mouth daily (Patient taking differently: Take 75 mcg by mouth every morning ) 90 tablet 3    insulin glargine (LANTUS SOLOSTAR) 100 UNIT/ML injection pen Inject 40 Units into the skin nightly (Patient taking differently: Inject 52 Units into the skin nightly ) 5 Pen 3    insulin lispro (HUMALOG KWIKPEN) 100 UNIT/ML pen Inject 10 Units into the skin 3 times daily (before meals) Take 11 units before breakfast and lunch and 13 units before dinner (Patient taking differently: Inject 12 Units into the skin 2 times daily 2/23/2018 noted 12 units sq daily . And 10 units sq 2 x daily) 1 Pen 0    albuterol sulfate  (90 Base) MCG/ACT inhaler Inhale 2 puffs into the lungs every 6 hours as needed for Wheezing 3 Inhaler 4    ipratropium-albuterol (DUONEB) 0.5-2.5 (3) MG/3ML SOLN nebulizer solution Inhale 3 mLs into the lungs every 4 hours 810 mL 4    nitroGLYCERIN (NITROSTAT) 0.4 MG SL tablet Place 1 tablet under the tongue every 5 minutes as needed for Chest pain up to max of 3 total doses.  If no relief after 1 dose, call 911. 75 tablet 4    apixaban (ELIQUIS) 5 MG TABS tablet Take 1 tablet by mouth 2 times daily 180 tablet 4    acetaminophen (TYLENOL) 500 MG tablet Take 1,000 mg by mouth every 6 hours as needed for Pain       aspirin 81 MG tablet Take 81 mg by mouth every morning       bisacodyl (DULCOLAX) 10 MG suppository Place 10 mg rectally daily 2016    LVH EF 45-50%    LUMBAR SPINE SURGERY      Dr. Sahara Viveros in [address] [place] [facility]  5/20/15, 2013, 13    Dr Azar Rivera @ [employer]  2017    Dr Tobar-GERD/HIATAL HERNIA/gastritis       Allergies   Allergen Reactions    Iv [Iodides] Anaphylaxis     uncertain    Codeine     Cymbalta [Duloxetine Hcl] Swelling    Gabapentin Swelling    Hydrocodone     Morphine     Pradaxa [Dabigatran Etexilate Mesylate] Other (See Comments)     Bleeding episode    Oxycontin [Oxycodone Hcl] Anxiety    Penicillins Rash       Social History     Social History    Marital status:      Spouse name: N/A    Number of children: 3    Years of education: N/A     Occupational History    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 drinks 2 glasses of  diet coke daily, occasional peach ice tea. 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 She had smoked for over 50 years, and smoked on average 1 pack every three days    She denies alcohol or illicit drug use     She lives with her daughter             Family History:     Mother  of breast cancer and was a diabetic    One

## 2018-11-05 ENCOUNTER — HOSPITAL ENCOUNTER (OUTPATIENT)
Dept: OTHER | Age: 73
Setting detail: THERAPIES SERIES
Discharge: HOME | End: 2018-11-05
Payer: MEDICARE

## 2018-11-05 VITALS
HEART RATE: 104 BPM | DIASTOLIC BLOOD PRESSURE: 73 MMHG | WEIGHT: 237 LBS | SYSTOLIC BLOOD PRESSURE: 136 MMHG | RESPIRATION RATE: 18 BRPM | BODY MASS INDEX: 41.32 KG/M2

## 2018-11-05 LAB
ANION GAP SERPL CALCULATED.3IONS-SCNC: 12 MMOL/L (ref 7–16)
BUN BLDV-MCNC: 42 MG/DL (ref 8–23)
CALCIUM SERPL-MCNC: 9.2 MG/DL (ref 8.6–10.2)
CHLORIDE BLD-SCNC: 101 MMOL/L (ref 98–107)
CO2: 28 MMOL/L (ref 22–29)
CREAT SERPL-MCNC: 1.6 MG/DL (ref 0.5–1)
GFR AFRICAN AMERICAN: 38
GFR NON-AFRICAN AMERICAN: 38 ML/MIN/1.73
GLUCOSE BLD-MCNC: 158 MG/DL (ref 74–99)
POTASSIUM SERPL-SCNC: 4.1 MMOL/L (ref 3.5–5)
PRO-BNP: 2762 PG/ML (ref 0–125)
SODIUM BLD-SCNC: 141 MMOL/L (ref 132–146)

## 2018-11-05 PROCEDURE — 36415 COLL VENOUS BLD VENIPUNCTURE: CPT

## 2018-11-05 PROCEDURE — 80048 BASIC METABOLIC PNL TOTAL CA: CPT

## 2018-11-05 PROCEDURE — 99214 OFFICE O/P EST MOD 30 MIN: CPT

## 2018-11-05 PROCEDURE — 83880 ASSAY OF NATRIURETIC PEPTIDE: CPT

## 2018-12-03 ENCOUNTER — HOSPITAL ENCOUNTER (OUTPATIENT)
Dept: OTHER | Age: 73
Setting detail: THERAPIES SERIES
Discharge: HOME | End: 2018-12-03
Payer: MEDICARE

## 2018-12-03 NOTE — PROGRESS NOTES
No show for today's appt. Possibly due to [facility] .

## 2019-01-03 ENCOUNTER — HOSPITAL ENCOUNTER (OUTPATIENT)
Age: 74
Discharge: HOME | End: 2019-01-05
Payer: MEDICARE

## 2019-01-03 ENCOUNTER — OFFICE VISIT (OUTPATIENT)
Dept: CARDIOLOGY CLINIC | Age: 74
End: 2019-01-03
Payer: MEDICARE

## 2019-01-03 VITALS
SYSTOLIC BLOOD PRESSURE: 132 MMHG | DIASTOLIC BLOOD PRESSURE: 80 MMHG | BODY MASS INDEX: 43.38 KG/M2 | RESPIRATION RATE: 16 BRPM | WEIGHT: 244.8 LBS | HEIGHT: 63 IN | HEART RATE: 79 BPM

## 2019-01-03 DIAGNOSIS — I25.10 CORONARY ARTERY DISEASE INVOLVING NATIVE CORONARY ARTERY OF NATIVE HEART WITHOUT ANGINA PECTORIS: Primary | Chronic | ICD-10-CM

## 2019-01-03 DIAGNOSIS — I50.43 CHF (CONGESTIVE HEART FAILURE), NYHA CLASS I, ACUTE ON CHRONIC, COMBINED (HCC): ICD-10-CM

## 2019-01-03 DIAGNOSIS — I25.10 CORONARY ARTERY DISEASE INVOLVING NATIVE CORONARY ARTERY OF NATIVE HEART WITHOUT ANGINA PECTORIS: Chronic | ICD-10-CM

## 2019-01-03 LAB
ANION GAP SERPL CALCULATED.3IONS-SCNC: 11 MMOL/L (ref 7–16)
BUN BLDV-MCNC: 28 MG/DL (ref 8–23)
CALCIUM SERPL-MCNC: 9 MG/DL (ref 8.6–10.2)
CHLORIDE BLD-SCNC: 106 MMOL/L (ref 98–107)
CO2: 27 MMOL/L (ref 22–29)
CREAT SERPL-MCNC: 1.7 MG/DL (ref 0.5–1)
GFR AFRICAN AMERICAN: 36
GFR NON-AFRICAN AMERICAN: 36 ML/MIN/1.73
GLUCOSE BLD-MCNC: 102 MG/DL (ref 74–99)
POTASSIUM SERPL-SCNC: 4.5 MMOL/L (ref 3.5–5)
SODIUM BLD-SCNC: 144 MMOL/L (ref 132–146)

## 2019-01-03 PROCEDURE — 1090F PRES/ABSN URINE INCON ASSESS: CPT | Performed by: INTERNAL MEDICINE

## 2019-01-03 PROCEDURE — 3017F COLORECTAL CA SCREEN DOC REV: CPT | Performed by: INTERNAL MEDICINE

## 2019-01-03 PROCEDURE — G8598 ASA/ANTIPLAT THER USED: HCPCS | Performed by: INTERNAL MEDICINE

## 2019-01-03 PROCEDURE — 99214 OFFICE O/P EST MOD 30 MIN: CPT | Performed by: INTERNAL MEDICINE

## 2019-01-03 PROCEDURE — 80048 BASIC METABOLIC PNL TOTAL CA: CPT

## 2019-01-03 PROCEDURE — 4040F PNEUMOC VAC/ADMIN/RCVD: CPT | Performed by: INTERNAL MEDICINE

## 2019-01-03 PROCEDURE — 1123F ACP DISCUSS/DSCN MKR DOCD: CPT | Performed by: INTERNAL MEDICINE

## 2019-01-03 PROCEDURE — G8399 PT W/DXA RESULTS DOCUMENT: HCPCS | Performed by: INTERNAL MEDICINE

## 2019-01-03 PROCEDURE — 93000 ELECTROCARDIOGRAM COMPLETE: CPT | Performed by: INTERNAL MEDICINE

## 2019-01-03 PROCEDURE — 1036F TOBACCO NON-USER: CPT | Performed by: INTERNAL MEDICINE

## 2019-01-03 PROCEDURE — 1101F PT FALLS ASSESS-DOCD LE1/YR: CPT | Performed by: INTERNAL MEDICINE

## 2019-01-03 PROCEDURE — G8484 FLU IMMUNIZE NO ADMIN: HCPCS | Performed by: INTERNAL MEDICINE

## 2019-01-03 PROCEDURE — G8417 CALC BMI ABV UP PARAM F/U: HCPCS | Performed by: INTERNAL MEDICINE

## 2019-01-03 PROCEDURE — G8427 DOCREV CUR MEDS BY ELIG CLIN: HCPCS | Performed by: INTERNAL MEDICINE

## 2019-01-07 ENCOUNTER — TELEPHONE (OUTPATIENT)
Dept: CARDIOLOGY CLINIC | Age: 74
End: 2019-01-07

## 2019-01-14 ENCOUNTER — OFFICE VISIT (OUTPATIENT)
Dept: FAMILY MEDICINE CLINIC | Age: 74
End: 2019-01-14
Payer: MEDICARE

## 2019-01-14 VITALS
BODY MASS INDEX: 43.23 KG/M2 | OXYGEN SATURATION: 96 % | RESPIRATION RATE: 14 BRPM | HEART RATE: 73 BPM | WEIGHT: 244 LBS | HEIGHT: 63 IN | DIASTOLIC BLOOD PRESSURE: 70 MMHG | SYSTOLIC BLOOD PRESSURE: 138 MMHG

## 2019-01-14 DIAGNOSIS — J44.9 CHRONIC OBSTRUCTIVE PULMONARY DISEASE, UNSPECIFIED COPD TYPE (HCC): Primary | ICD-10-CM

## 2019-01-14 DIAGNOSIS — I48.20 CHRONIC ATRIAL FIBRILLATION (HCC): Chronic | ICD-10-CM

## 2019-01-14 DIAGNOSIS — Z79.4 DIABETES MELLITUS, TYPE II, INSULIN DEPENDENT (HCC): ICD-10-CM

## 2019-01-14 DIAGNOSIS — G47.33 OSA (OBSTRUCTIVE SLEEP APNEA): ICD-10-CM

## 2019-01-14 DIAGNOSIS — E11.9 DIABETES MELLITUS, TYPE II, INSULIN DEPENDENT (HCC): ICD-10-CM

## 2019-01-14 DIAGNOSIS — M17.0 PRIMARY OSTEOARTHRITIS OF BOTH KNEES: ICD-10-CM

## 2019-01-14 LAB — HBA1C MFR BLD: 6.7 %

## 2019-01-14 PROCEDURE — 1101F PT FALLS ASSESS-DOCD LE1/YR: CPT | Performed by: FAMILY MEDICINE

## 2019-01-14 PROCEDURE — 3044F HG A1C LEVEL LT 7.0%: CPT | Performed by: FAMILY MEDICINE

## 2019-01-14 PROCEDURE — 1090F PRES/ABSN URINE INCON ASSESS: CPT | Performed by: FAMILY MEDICINE

## 2019-01-14 PROCEDURE — 83036 HEMOGLOBIN GLYCOSYLATED A1C: CPT | Performed by: FAMILY MEDICINE

## 2019-01-14 PROCEDURE — G8417 CALC BMI ABV UP PARAM F/U: HCPCS | Performed by: FAMILY MEDICINE

## 2019-01-14 PROCEDURE — 3017F COLORECTAL CA SCREEN DOC REV: CPT | Performed by: FAMILY MEDICINE

## 2019-01-14 PROCEDURE — 1123F ACP DISCUSS/DSCN MKR DOCD: CPT | Performed by: FAMILY MEDICINE

## 2019-01-14 PROCEDURE — G8427 DOCREV CUR MEDS BY ELIG CLIN: HCPCS | Performed by: FAMILY MEDICINE

## 2019-01-14 PROCEDURE — 3023F SPIROM DOC REV: CPT | Performed by: FAMILY MEDICINE

## 2019-01-14 PROCEDURE — G8926 SPIRO NO PERF OR DOC: HCPCS | Performed by: FAMILY MEDICINE

## 2019-01-14 PROCEDURE — G8484 FLU IMMUNIZE NO ADMIN: HCPCS | Performed by: FAMILY MEDICINE

## 2019-01-14 PROCEDURE — G8399 PT W/DXA RESULTS DOCUMENT: HCPCS | Performed by: FAMILY MEDICINE

## 2019-01-14 PROCEDURE — 2022F DILAT RTA XM EVC RTNOPTHY: CPT | Performed by: FAMILY MEDICINE

## 2019-01-14 PROCEDURE — G8598 ASA/ANTIPLAT THER USED: HCPCS | Performed by: FAMILY MEDICINE

## 2019-01-14 PROCEDURE — 1036F TOBACCO NON-USER: CPT | Performed by: FAMILY MEDICINE

## 2019-01-14 PROCEDURE — 4040F PNEUMOC VAC/ADMIN/RCVD: CPT | Performed by: FAMILY MEDICINE

## 2019-01-14 PROCEDURE — 99214 OFFICE O/P EST MOD 30 MIN: CPT | Performed by: FAMILY MEDICINE

## 2019-01-14 RX ORDER — TRAMADOL HYDROCHLORIDE 50 MG/1
50 TABLET ORAL EVERY 8 HOURS PRN
Qty: 90 TABLET | Refills: 1 | Status: SHIPPED | OUTPATIENT
Start: 2019-01-14 | End: 2019-01-16 | Stop reason: SDUPTHER

## 2019-01-14 ASSESSMENT — ENCOUNTER SYMPTOMS
BLOOD IN STOOL: 0
APNEA: 0
BACK PAIN: 0
FACIAL SWELLING: 0
PHOTOPHOBIA: 0
SHORTNESS OF BREATH: 0
COUGH: 0
SORE THROAT: 0
WHEEZING: 0
SINUS PRESSURE: 0
DIARRHEA: 0
NAUSEA: 0
ALLERGIC/IMMUNOLOGIC NEGATIVE: 1
COLOR CHANGE: 0
VOMITING: 0
CHEST TIGHTNESS: 0
ABDOMINAL PAIN: 0

## 2019-01-14 ASSESSMENT — PATIENT HEALTH QUESTIONNAIRE - PHQ9
2. FEELING DOWN, DEPRESSED OR HOPELESS: 0
1. LITTLE INTEREST OR PLEASURE IN DOING THINGS: 0
SUM OF ALL RESPONSES TO PHQ9 QUESTIONS 1 & 2: 0
SUM OF ALL RESPONSES TO PHQ QUESTIONS 1-9: 0
SUM OF ALL RESPONSES TO PHQ QUESTIONS 1-9: 0

## 2019-01-15 ENCOUNTER — TELEPHONE (OUTPATIENT)
Dept: FAMILY MEDICINE CLINIC | Age: 74
End: 2019-01-15

## 2019-01-15 DIAGNOSIS — I50.23 ACUTE ON CHRONIC SYSTOLIC CONGESTIVE HEART FAILURE (HCC): Primary | ICD-10-CM

## 2019-01-16 DIAGNOSIS — E11.9 DIABETES MELLITUS, TYPE II, INSULIN DEPENDENT (HCC): ICD-10-CM

## 2019-01-16 DIAGNOSIS — Z79.4 DIABETES MELLITUS, TYPE II, INSULIN DEPENDENT (HCC): ICD-10-CM

## 2019-01-16 DIAGNOSIS — M17.0 PRIMARY OSTEOARTHRITIS OF BOTH KNEES: ICD-10-CM

## 2019-01-16 RX ORDER — TRAMADOL HYDROCHLORIDE 50 MG/1
50 TABLET ORAL EVERY 8 HOURS PRN
Qty: 90 TABLET | Refills: 1 | Status: SHIPPED | OUTPATIENT
Start: 2019-01-16 | End: 2019-02-15

## 2019-02-15 DIAGNOSIS — I50.23 ACUTE ON CHRONIC SYSTOLIC CONGESTIVE HEART FAILURE (HCC): ICD-10-CM

## 2019-02-15 RX ORDER — BUMETANIDE 2 MG/1
2 TABLET ORAL DAILY
Qty: 30 TABLET | Refills: 3 | Status: SHIPPED | OUTPATIENT
Start: 2019-02-15 | End: 2019-02-18 | Stop reason: SDUPTHER

## 2019-02-15 RX ORDER — PEN NEEDLE, DIABETIC 31 GX5/16"
1 NEEDLE, DISPOSABLE MISCELLANEOUS 3 TIMES DAILY
Qty: 100 EACH | Refills: 1 | Status: SHIPPED | OUTPATIENT
Start: 2019-02-15 | End: 2019-04-23 | Stop reason: SDUPTHER

## 2019-02-15 RX ORDER — POTASSIUM CHLORIDE 1.5 G/1.77G
20 POWDER, FOR SOLUTION ORAL 2 TIMES DAILY
Qty: 30 EACH | Refills: 2 | Status: SHIPPED | OUTPATIENT
Start: 2019-02-15 | End: 2019-02-26 | Stop reason: SDUPTHER

## 2019-02-18 ENCOUNTER — OFFICE VISIT (OUTPATIENT)
Dept: CARDIOLOGY CLINIC | Age: 74
End: 2019-02-18
Payer: MEDICARE

## 2019-02-18 VITALS
DIASTOLIC BLOOD PRESSURE: 60 MMHG | RESPIRATION RATE: 16 BRPM | HEART RATE: 86 BPM | SYSTOLIC BLOOD PRESSURE: 110 MMHG | BODY MASS INDEX: 43.22 KG/M2 | HEIGHT: 63 IN

## 2019-02-18 DIAGNOSIS — I48.20 CHRONIC ATRIAL FIBRILLATION (HCC): Primary | Chronic | ICD-10-CM

## 2019-02-18 DIAGNOSIS — I50.23 ACUTE ON CHRONIC SYSTOLIC CONGESTIVE HEART FAILURE (HCC): ICD-10-CM

## 2019-02-18 PROCEDURE — G8417 CALC BMI ABV UP PARAM F/U: HCPCS | Performed by: INTERNAL MEDICINE

## 2019-02-18 PROCEDURE — 1090F PRES/ABSN URINE INCON ASSESS: CPT | Performed by: INTERNAL MEDICINE

## 2019-02-18 PROCEDURE — G8399 PT W/DXA RESULTS DOCUMENT: HCPCS | Performed by: INTERNAL MEDICINE

## 2019-02-18 PROCEDURE — 1123F ACP DISCUSS/DSCN MKR DOCD: CPT | Performed by: INTERNAL MEDICINE

## 2019-02-18 PROCEDURE — 1101F PT FALLS ASSESS-DOCD LE1/YR: CPT | Performed by: INTERNAL MEDICINE

## 2019-02-18 PROCEDURE — 1036F TOBACCO NON-USER: CPT | Performed by: INTERNAL MEDICINE

## 2019-02-18 PROCEDURE — 99214 OFFICE O/P EST MOD 30 MIN: CPT | Performed by: INTERNAL MEDICINE

## 2019-02-18 PROCEDURE — G8484 FLU IMMUNIZE NO ADMIN: HCPCS | Performed by: INTERNAL MEDICINE

## 2019-02-18 PROCEDURE — G8427 DOCREV CUR MEDS BY ELIG CLIN: HCPCS | Performed by: INTERNAL MEDICINE

## 2019-02-18 PROCEDURE — 3017F COLORECTAL CA SCREEN DOC REV: CPT | Performed by: INTERNAL MEDICINE

## 2019-02-18 PROCEDURE — 4040F PNEUMOC VAC/ADMIN/RCVD: CPT | Performed by: INTERNAL MEDICINE

## 2019-02-18 PROCEDURE — G8598 ASA/ANTIPLAT THER USED: HCPCS | Performed by: INTERNAL MEDICINE

## 2019-02-18 PROCEDURE — 93000 ELECTROCARDIOGRAM COMPLETE: CPT | Performed by: INTERNAL MEDICINE

## 2019-02-18 RX ORDER — BUMETANIDE 2 MG/1
2 TABLET ORAL 2 TIMES DAILY
Qty: 60 TABLET | Refills: 3 | Status: SHIPPED | OUTPATIENT
Start: 2019-02-18 | End: 2019-02-20 | Stop reason: SDUPTHER

## 2019-02-18 RX ORDER — TRAMADOL HYDROCHLORIDE 50 MG/1
50 TABLET ORAL EVERY 6 HOURS PRN
COMMUNITY
End: 2019-04-05 | Stop reason: SDUPTHER

## 2019-02-20 DIAGNOSIS — I50.23 ACUTE ON CHRONIC SYSTOLIC CONGESTIVE HEART FAILURE (HCC): ICD-10-CM

## 2019-02-20 RX ORDER — BUMETANIDE 2 MG/1
2 TABLET ORAL 2 TIMES DAILY
Qty: 60 TABLET | Refills: 3 | Status: SHIPPED | OUTPATIENT
Start: 2019-02-20 | End: 2019-06-18 | Stop reason: SDUPTHER

## 2019-02-26 DIAGNOSIS — Z79.4 DIABETES MELLITUS, TYPE II, INSULIN DEPENDENT (HCC): ICD-10-CM

## 2019-02-26 DIAGNOSIS — E11.9 DIABETES MELLITUS, TYPE II, INSULIN DEPENDENT (HCC): ICD-10-CM

## 2019-02-26 RX ORDER — HYDRALAZINE HYDROCHLORIDE 50 MG/1
50 TABLET, FILM COATED ORAL 3 TIMES DAILY
Qty: 90 TABLET | Refills: 5 | Status: SHIPPED | OUTPATIENT
Start: 2019-02-26 | End: 2019-08-22 | Stop reason: SDUPTHER

## 2019-02-26 RX ORDER — POTASSIUM CHLORIDE 1.5 G/1.77G
POWDER, FOR SOLUTION ORAL
Qty: 30 EACH | Refills: 5 | Status: SHIPPED | OUTPATIENT
Start: 2019-02-26 | End: 2019-03-26 | Stop reason: SDUPTHER

## 2019-02-26 RX ORDER — LISINOPRIL 20 MG/1
20 TABLET ORAL 2 TIMES DAILY
Qty: 60 TABLET | Refills: 5 | Status: SHIPPED | OUTPATIENT
Start: 2019-02-26 | End: 2019-08-22 | Stop reason: SDUPTHER

## 2019-02-26 RX ORDER — FAMOTIDINE 20 MG/1
20 TABLET, FILM COATED ORAL 2 TIMES DAILY
Qty: 60 TABLET | Refills: 5 | Status: ON HOLD | OUTPATIENT
Start: 2019-02-26 | End: 2019-09-19 | Stop reason: SDUPTHER

## 2019-02-26 RX ORDER — GLUCOSAM/CHON-MSM1/C/MANG/BOSW 500-416.6
TABLET ORAL
Qty: 100 EACH | Refills: 5 | Status: SHIPPED | OUTPATIENT
Start: 2019-02-26 | End: 2019-04-08 | Stop reason: SDUPTHER

## 2019-02-26 RX ORDER — FLUTICASONE PROPIONATE 50 MCG
2 SPRAY, SUSPENSION (ML) NASAL EVERY MORNING
Qty: 1 BOTTLE | Refills: 5 | Status: SHIPPED | OUTPATIENT
Start: 2019-02-26 | End: 2019-08-22 | Stop reason: SDUPTHER

## 2019-02-26 RX ORDER — LEVOTHYROXINE SODIUM 0.07 MG/1
75 TABLET ORAL DAILY
Qty: 30 TABLET | Refills: 5 | Status: SHIPPED | OUTPATIENT
Start: 2019-02-26 | End: 2019-08-22 | Stop reason: SDUPTHER

## 2019-02-26 RX ORDER — CARVEDILOL 25 MG/1
25 TABLET ORAL 2 TIMES DAILY WITH MEALS
Qty: 60 TABLET | Refills: 5 | Status: SHIPPED | OUTPATIENT
Start: 2019-02-26 | End: 2019-08-22 | Stop reason: SDUPTHER

## 2019-02-26 RX ORDER — LOPERAMIDE HYDROCHLORIDE 2 MG/1
2 CAPSULE ORAL 4 TIMES DAILY PRN
Qty: 120 CAPSULE | Refills: 5 | Status: SHIPPED | OUTPATIENT
Start: 2019-02-26 | End: 2019-04-15

## 2019-02-26 RX ORDER — ATORVASTATIN CALCIUM 40 MG/1
40 TABLET, FILM COATED ORAL DAILY
Qty: 30 TABLET | Refills: 5 | Status: SHIPPED | OUTPATIENT
Start: 2019-02-26 | End: 2019-08-22 | Stop reason: SDUPTHER

## 2019-02-27 RX ORDER — NITROGLYCERIN 0.4 MG/1
TABLET SUBLINGUAL
Qty: 25 TABLET | Refills: 3 | Status: SHIPPED | OUTPATIENT
Start: 2019-02-27 | End: 2019-02-28 | Stop reason: SDUPTHER

## 2019-02-28 ENCOUNTER — OFFICE VISIT (OUTPATIENT)
Dept: ORTHOPEDIC SURGERY | Age: 74
End: 2019-02-28
Payer: MEDICARE

## 2019-02-28 VITALS
DIASTOLIC BLOOD PRESSURE: 81 MMHG | HEART RATE: 79 BPM | BODY MASS INDEX: 40.67 KG/M2 | TEMPERATURE: 97.7 F | SYSTOLIC BLOOD PRESSURE: 132 MMHG | WEIGHT: 238.2 LBS | HEIGHT: 64 IN

## 2019-02-28 DIAGNOSIS — M17.0 PRIMARY OSTEOARTHRITIS OF BOTH KNEES: Primary | ICD-10-CM

## 2019-02-28 PROCEDURE — 4040F PNEUMOC VAC/ADMIN/RCVD: CPT | Performed by: ORTHOPAEDIC SURGERY

## 2019-02-28 PROCEDURE — 3017F COLORECTAL CA SCREEN DOC REV: CPT | Performed by: ORTHOPAEDIC SURGERY

## 2019-02-28 PROCEDURE — G8417 CALC BMI ABV UP PARAM F/U: HCPCS | Performed by: ORTHOPAEDIC SURGERY

## 2019-02-28 PROCEDURE — 1101F PT FALLS ASSESS-DOCD LE1/YR: CPT | Performed by: ORTHOPAEDIC SURGERY

## 2019-02-28 PROCEDURE — 1090F PRES/ABSN URINE INCON ASSESS: CPT | Performed by: ORTHOPAEDIC SURGERY

## 2019-02-28 PROCEDURE — G8399 PT W/DXA RESULTS DOCUMENT: HCPCS | Performed by: ORTHOPAEDIC SURGERY

## 2019-02-28 PROCEDURE — G8598 ASA/ANTIPLAT THER USED: HCPCS | Performed by: ORTHOPAEDIC SURGERY

## 2019-02-28 PROCEDURE — 20610 DRAIN/INJ JOINT/BURSA W/O US: CPT | Performed by: ORTHOPAEDIC SURGERY

## 2019-02-28 PROCEDURE — G8484 FLU IMMUNIZE NO ADMIN: HCPCS | Performed by: ORTHOPAEDIC SURGERY

## 2019-02-28 PROCEDURE — 1123F ACP DISCUSS/DSCN MKR DOCD: CPT | Performed by: ORTHOPAEDIC SURGERY

## 2019-02-28 PROCEDURE — G8427 DOCREV CUR MEDS BY ELIG CLIN: HCPCS | Performed by: ORTHOPAEDIC SURGERY

## 2019-02-28 PROCEDURE — 1036F TOBACCO NON-USER: CPT | Performed by: ORTHOPAEDIC SURGERY

## 2019-02-28 PROCEDURE — 99213 OFFICE O/P EST LOW 20 MIN: CPT | Performed by: ORTHOPAEDIC SURGERY

## 2019-02-28 RX ORDER — LIDOCAINE HYDROCHLORIDE 10 MG/ML
3 INJECTION, SOLUTION INFILTRATION; PERINEURAL ONCE
Status: COMPLETED | OUTPATIENT
Start: 2019-02-28 | End: 2019-02-28

## 2019-02-28 RX ORDER — TRIAMCINOLONE ACETONIDE 40 MG/ML
80 INJECTION, SUSPENSION INTRA-ARTICULAR; INTRAMUSCULAR ONCE
Status: COMPLETED | OUTPATIENT
Start: 2019-02-28 | End: 2019-02-28

## 2019-02-28 RX ADMIN — LIDOCAINE HYDROCHLORIDE 3 ML: 10 INJECTION, SOLUTION INFILTRATION; PERINEURAL at 14:28

## 2019-02-28 RX ADMIN — TRIAMCINOLONE ACETONIDE 80 MG: 40 INJECTION, SUSPENSION INTRA-ARTICULAR; INTRAMUSCULAR at 14:28

## 2019-03-07 ENCOUNTER — TELEPHONE (OUTPATIENT)
Dept: ENT CLINIC | Age: 74
End: 2019-03-07

## 2019-03-07 DIAGNOSIS — E04.1 THYROID NODULE: Primary | ICD-10-CM

## 2019-03-26 RX ORDER — POTASSIUM CHLORIDE 1.5 G/1.77G
POWDER, FOR SOLUTION ORAL
Qty: 30 EACH | Refills: 5 | Status: SHIPPED | OUTPATIENT
Start: 2019-03-26 | End: 2019-04-23 | Stop reason: SDUPTHER

## 2019-03-27 DIAGNOSIS — E11.9 DIABETES MELLITUS, TYPE II, INSULIN DEPENDENT (HCC): Primary | ICD-10-CM

## 2019-03-27 DIAGNOSIS — R26.9 GAIT DIFFICULTY: ICD-10-CM

## 2019-03-27 DIAGNOSIS — Z79.4 DIABETES MELLITUS, TYPE II, INSULIN DEPENDENT (HCC): Primary | ICD-10-CM

## 2019-04-05 ENCOUNTER — OFFICE VISIT (OUTPATIENT)
Dept: FAMILY MEDICINE CLINIC | Age: 74
End: 2019-04-05
Payer: MEDICARE

## 2019-04-05 VITALS
RESPIRATION RATE: 20 BRPM | DIASTOLIC BLOOD PRESSURE: 72 MMHG | BODY MASS INDEX: 40.63 KG/M2 | TEMPERATURE: 98.3 F | HEIGHT: 64 IN | WEIGHT: 238 LBS | OXYGEN SATURATION: 98 % | SYSTOLIC BLOOD PRESSURE: 120 MMHG | HEART RATE: 94 BPM

## 2019-04-05 DIAGNOSIS — J20.9 ACUTE BRONCHITIS, UNSPECIFIED ORGANISM: ICD-10-CM

## 2019-04-05 DIAGNOSIS — M50.90 CERVICAL DISC DISEASE: ICD-10-CM

## 2019-04-05 DIAGNOSIS — M15.9 PRIMARY OSTEOARTHRITIS INVOLVING MULTIPLE JOINTS: ICD-10-CM

## 2019-04-05 DIAGNOSIS — R26.9 GAIT DIFFICULTY: Primary | ICD-10-CM

## 2019-04-05 DIAGNOSIS — R42 DIZZINESS: ICD-10-CM

## 2019-04-05 PROCEDURE — 4040F PNEUMOC VAC/ADMIN/RCVD: CPT | Performed by: FAMILY MEDICINE

## 2019-04-05 PROCEDURE — 3017F COLORECTAL CA SCREEN DOC REV: CPT | Performed by: FAMILY MEDICINE

## 2019-04-05 PROCEDURE — G8598 ASA/ANTIPLAT THER USED: HCPCS | Performed by: FAMILY MEDICINE

## 2019-04-05 PROCEDURE — 1090F PRES/ABSN URINE INCON ASSESS: CPT | Performed by: FAMILY MEDICINE

## 2019-04-05 PROCEDURE — G8427 DOCREV CUR MEDS BY ELIG CLIN: HCPCS | Performed by: FAMILY MEDICINE

## 2019-04-05 PROCEDURE — 1036F TOBACCO NON-USER: CPT | Performed by: FAMILY MEDICINE

## 2019-04-05 PROCEDURE — G8417 CALC BMI ABV UP PARAM F/U: HCPCS | Performed by: FAMILY MEDICINE

## 2019-04-05 PROCEDURE — 99213 OFFICE O/P EST LOW 20 MIN: CPT | Performed by: FAMILY MEDICINE

## 2019-04-05 PROCEDURE — G8399 PT W/DXA RESULTS DOCUMENT: HCPCS | Performed by: FAMILY MEDICINE

## 2019-04-05 PROCEDURE — 1123F ACP DISCUSS/DSCN MKR DOCD: CPT | Performed by: FAMILY MEDICINE

## 2019-04-05 RX ORDER — PREDNISONE 20 MG/1
TABLET ORAL
Qty: 11 TABLET | Refills: 0 | Status: SHIPPED | OUTPATIENT
Start: 2019-04-05 | End: 2019-04-15

## 2019-04-05 RX ORDER — ALBUTEROL SULFATE 90 UG/1
2 AEROSOL, METERED RESPIRATORY (INHALATION) EVERY 6 HOURS PRN
Qty: 3 INHALER | Refills: 4 | Status: SHIPPED | OUTPATIENT
Start: 2019-04-05 | End: 2020-01-06 | Stop reason: SDUPTHER

## 2019-04-05 RX ORDER — DOXYCYCLINE HYCLATE 100 MG
100 TABLET ORAL 2 TIMES DAILY
Qty: 20 TABLET | Refills: 0 | Status: SHIPPED | OUTPATIENT
Start: 2019-04-05 | End: 2019-04-15

## 2019-04-05 RX ORDER — MECLIZINE HCL 12.5 MG/1
12.5 TABLET ORAL 3 TIMES DAILY PRN
Qty: 30 TABLET | Refills: 2 | Status: SHIPPED | OUTPATIENT
Start: 2019-04-05 | End: 2019-04-08 | Stop reason: SDUPTHER

## 2019-04-05 RX ORDER — TRAMADOL HYDROCHLORIDE 50 MG/1
50 TABLET ORAL EVERY 6 HOURS PRN
Qty: 90 TABLET | Refills: 1 | Status: SHIPPED | OUTPATIENT
Start: 2019-04-05 | End: 2019-05-05

## 2019-04-05 ASSESSMENT — ENCOUNTER SYMPTOMS
BACK PAIN: 0
VOMITING: 0
SINUS PRESSURE: 0
ABDOMINAL PAIN: 0
WHEEZING: 1
ALLERGIC/IMMUNOLOGIC NEGATIVE: 1
FACIAL SWELLING: 0
APNEA: 0
NAUSEA: 0
BLOOD IN STOOL: 0
DIARRHEA: 0
SHORTNESS OF BREATH: 0
SORE THROAT: 0
CHEST TIGHTNESS: 0
COLOR CHANGE: 0
COUGH: 1
PHOTOPHOBIA: 0

## 2019-04-05 ASSESSMENT — PATIENT HEALTH QUESTIONNAIRE - PHQ9
SUM OF ALL RESPONSES TO PHQ QUESTIONS 1-9: 0
SUM OF ALL RESPONSES TO PHQ9 QUESTIONS 1 & 2: 0
1. LITTLE INTEREST OR PLEASURE IN DOING THINGS: 0
SUM OF ALL RESPONSES TO PHQ QUESTIONS 1-9: 0
2. FEELING DOWN, DEPRESSED OR HOPELESS: 0

## 2019-04-05 NOTE — PROGRESS NOTES
Chief Complaint:   Chief Complaint   Patient presents with    Cough     x 3 days    Chest Congestion    Nasal Congestion       Cough   This is a new problem. The current episode started in the past 7 days. The problem has been gradually worsening. The problem occurs every few minutes. The cough is productive of sputum. Associated symptoms include wheezing. Pertinent negatives include no chest pain, headaches, myalgias, rash, sore throat or shortness of breath. She has tried OTC cough suppressant for the symptoms. The treatment provided mild relief.        Patient Active Problem List   Diagnosis    Acute on chronic systolic congestive heart failure (HCC)    Chronic atrial fibrillation (HCC)    HTN (hypertension)    Asthma    Hyperlipidemia    CAD (coronary artery disease)    CKD (chronic kidney disease) stage 3, GFR 30-59 ml/min ([facility])    Hypothyroidism    Anxiety    Gastroesophageal reflux disease without esophagitis    COPD (chronic obstructive pulmonary disease) ([address]    Diverticular disease    Vitamin D deficiency    Osteoporosis    STACEY (obstructive sleep apnea)    Peripheral neuropathy    Adenoma of left adrenal gland    Generalized OA    LVH (left ventricular hypertrophy)    Pulmonary hypertension (HCC)    Valvular heart disease    Congestive heart failure, NYHA class 3 and ACC/AHA stage C ([facility])    Left atrial dilation    Elevated plasma metanephrines    Albuminuria    Primary osteoarthritis of both knees    Dry eye    Hiatal hernia    Fecal incontinence    Primary osteoarthritis involving multiple joints    CHF, acute on chronic (HCC)    History of asthma    Dizziness    Chronic kidney disease    Diabetes mellitus, type II, insulin dependent ([address]    CHF (congestive heart failure), NYHA class I, acute on chronic, combined ([facility])    Dyspnea on exertion    Morbid obesity ([address]       Past Medical History:   Diagnosis Date    Adenoma of left adrenal gland     DARVIN (acute RELEASE Bilateral     done at [address]      x4    COLONOSCOPY  1/13/2014, 5/2008, 4/2003    [facility] Dr [address] Cea polyp, sigmoid diverticula, external hemorrhoid-repeat 5 yrs (1/2019)    CORONARY ANGIOPLASTY WITH STENT PLACEMENT  09/2013    [facility]    CORONARY ARTERY BYPASS GRAFT  1997    quadruple bypass @ [facility]    DOPPLER ECHOCARDIOGRAPHY  11/30/2016    LVH EF 45-50%    LUMBAR SPINE SURGERY  2003    Dr. Vijay Navarro in [address] [place] [address] Rd  5/20/15, 12/2013, 9/17/13    Dr Klaus Sherman @ [facility]  05/05/2017    Dr Tobar-GERD/HIATAL HERNIA/gastritis       Current Outpatient Medications   Medication Sig Dispense Refill    albuterol sulfate  (90 Base) MCG/ACT inhaler Inhale 2 puffs into the lungs every 6 hours as needed for Wheezing 3 Inhaler 4    traMADol (ULTRAM) 50 MG tablet Take 1 tablet by mouth every 6 hours as needed for Pain for up to 30 days.  90 tablet 1    predniSONE (DELTASONE) 20 MG tablet 2 tabs qd x 3 days  1 tab qd x 3 days 0.5 tab qd x 3 days 11 tablet 0    doxycycline hyclate (VIBRA-TABS) 100 MG tablet Take 1 tablet by mouth 2 times daily for 10 days 20 tablet 0    meclizine (ANTIVERT) 12.5 MG tablet Take 1 tablet by mouth 3 times daily as needed for Dizziness 30 tablet 2    potassium chloride (KLOR-CON) 20 MEQ packet Take 1 tablet by mouth only if extra bumex is taken for weight gain of 2 lbs in 2 days 30 each 5    levothyroxine (LEVOTHROID) 75 MCG tablet Take 1 tablet by mouth daily 30 tablet 5    atorvastatin (LIPITOR) 40 MG tablet Take 1 tablet by mouth daily 30 tablet 5    apixaban (ELIQUIS) 5 MG TABS tablet Take 1 tablet by mouth 2 times daily 60 tablet 5    carvedilol (COREG) 25 MG tablet Take 1 tablet by mouth 2 times daily (with meals) 60 tablet 5    insulin glargine (LANTUS SOLOSTAR) 100 UNIT/ML injection pen Inject 55 Units into the skin nightly 10 pen 5  TRUEPLUS LANCETS 33G MISC Test blood sugars three times daily    E11.9 100 each 5    famotidine (PEPCID) 20 MG tablet Take 1 tablet by mouth 2 times daily 60 tablet 5    lisinopril (PRINIVIL;ZESTRIL) 20 MG tablet Take 1 tablet by mouth 2 times daily 60 tablet 5    fluticasone (FLONASE) 50 MCG/ACT nasal spray 2 sprays by Nasal route every morning 1 Bottle 5    blood glucose test strips (TRUETRACK TEST) strip 1 each by In Vitro route 3 times daily As needed. E11.9 100 each 3    hydrALAZINE (APRESOLINE) 50 MG tablet Take 1 tablet by mouth 3 times daily 90 tablet 5    bumetanide (BUMEX) 2 MG tablet Take 1 tablet by mouth 2 times daily 60 tablet 3    Alcohol Swabs (ALCOHOL PREP) PADS 1 applicator by Does not apply route three times daily 100 each 1    Insulin Pen Needle (BD PEN NEEDLE IMELDA U/F) 32G X 4 MM MISC 1 each by Does not apply route 5 times daily 300 each 4    insulin aspart (NOVOLOG FLEXPEN) 100 UNIT/ML injection pen Inject 15 Units into the skin 2 times daily 5 pen 3    diphenhydrAMINE (BENADRYL) 25 MG capsule Take 25 mg by mouth every 6 hours as needed for Itching      docusate sodium (COLACE) 100 MG capsule Take 100 mg by mouth daily as needed for Constipation      saccharomyces boulardii (FLORASTOR) 250 MG capsule Take 250 mg by mouth daily probiotic      Coenzyme Q10 (COQ-10 PO) Take 1 capsule by mouth daily       nitroGLYCERIN (NITROSTAT) 0.4 MG SL tablet Place 1 tablet under the tongue every 5 minutes as needed for Chest pain up to max of 3 total doses. If no relief after 1 dose, call 911. 75 tablet 4    acetaminophen (TYLENOL) 500 MG tablet Take 1,000 mg by mouth every 6 hours as needed for Pain       Misc.  Devices (RAISED TOILET SEAT) MISC For OA involving multiple sites 1 each 0    aspirin 81 MG tablet Take 81 mg by mouth every morning       loperamide (IMODIUM) 2 MG capsule Take 1 capsule by mouth 4 times daily as needed for Diarrhea 120 capsule 5    ferrous sulfate 325 (65 None     Attends Faith service: None     Active member of club or organization: None     Attends meetings of clubs or organizations: None     Relationship status: None    Intimate partner violence:     Fear of current or ex partner: None     Emotionally abused: None     Physically abused: None     Forced sexual activity: None   Other Topics Concern    None   Social History Narrative    Moved from [place], [place] to [place] and lives with daughter Claudene Cowing        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 She had smoked for over 50 years, and smoked on average 1 pack every three days    She denies alcohol or illicit drug use     She lives with her daughter             Family History: Mother  of breast cancer and was a diabetic    One sister with history of diabetes       Family History   Problem Relation Age of Onset   Wilma Ayala Breast Cancer Mother     Diabetes Mother     Stroke Maternal Grandmother     Diabetes Maternal Grandmother     Diabetes Sister          Review of Systems   Constitutional: Negative. HENT: Negative for congestion, facial swelling, hearing loss, nosebleeds, sinus pressure and sore throat. Eyes: Negative for photophobia and visual disturbance. Respiratory: Positive for cough and wheezing. Negative for apnea, chest tightness and shortness of breath. Cardiovascular: Negative for chest pain, palpitations and leg swelling. Gastrointestinal: Negative for abdominal pain, blood in stool, diarrhea, nausea and vomiting. Genitourinary: Negative for difficulty urinating, frequency and urgency. Musculoskeletal: Negative for arthralgias, back pain, joint swelling and myalgias. Skin: Negative for color change and rash. Allergic/Immunologic: Negative. Neurological: Positive for dizziness.  Negative for syncope, weakness, light-headedness and headaches. Hematological: Negative. Psychiatric/Behavioral: Negative for agitation, behavioral problems, confusion and self-injury. The patient is not nervous/anxious. All other systems reviewed and are negative. Physical Exam   Constitutional: She is oriented to person, place, and time. She appears well-developed and well-nourished. No distress. HENT:   Head: Normocephalic and atraumatic. Nose: Nose normal.   Mouth/Throat: Oropharynx is clear and moist.   Eyes: Pupils are equal, round, and reactive to light. Conjunctivae and EOM are normal.   Neck: Normal range of motion. Neck supple. No JVD present. No thyromegaly present. Cardiovascular: Normal rate, regular rhythm and normal heart sounds. Exam reveals no gallop and no friction rub. No murmur heard. Pulmonary/Chest: Effort normal. No respiratory distress. She has wheezes. She has rhonchi. Abdominal: Soft. Bowel sounds are normal. She exhibits no distension. There is no tenderness. There is no rebound and no guarding. Musculoskeletal: Normal range of motion. Lymphadenopathy:     She has no cervical adenopathy. Neurological: She is alert and oriented to person, place, and time. She has normal reflexes. No cranial nerve deficit. She exhibits normal muscle tone. Coordination normal.   Skin: Skin is warm and dry. No rash noted. No erythema. Psychiatric: She has a normal mood and affect. Her behavior is normal. Judgment normal.   Nursing note and vitals reviewed. ASSESSMENT/PLAN:    Sina Reyna was seen today for cough, chest congestion and nasal congestion. Diagnoses and all orders for this visit:    Gait difficulty    Cervical disc disease    Primary osteoarthritis involving multiple joints  -     traMADol (ULTRAM) 50 MG tablet; Take 1 tablet by mouth every 6 hours as needed for Pain for up to 30 days.     Acute bronchitis, unspecified organism  -     predniSONE (DELTASONE) 20 MG tablet; 2 tabs qd x 3 days  1 tab qd x 3 days 0.5 tab qd x 3 days  -     doxycycline hyclate (VIBRA-TABS) 100 MG tablet; Take 1 tablet by mouth 2 times daily for 10 days    Dizziness  -     meclizine (ANTIVERT) 12.5 MG tablet; Take 1 tablet by mouth 3 times daily as needed for Dizziness    Other orders  -     albuterol sulfate  (90 Base) MCG/ACT inhaler;  Inhale 2 puffs into the lungs every 6 hours as needed for Wheezing            Monica Guerin DO    4/5/2019  2:13 PM

## 2019-04-08 DIAGNOSIS — R42 DIZZINESS: ICD-10-CM

## 2019-04-08 RX ORDER — GLUCOSAM/CHON-MSM1/C/MANG/BOSW 500-416.6
TABLET ORAL
Qty: 100 EACH | Refills: 5 | Status: ON HOLD
Start: 2019-04-08 | End: 2020-02-28 | Stop reason: HOSPADM

## 2019-04-08 RX ORDER — BLOOD-GLUCOSE METER
1 KIT MISCELLANEOUS DAILY PRN
Qty: 1 KIT | Refills: 0 | Status: SHIPPED | OUTPATIENT
Start: 2019-04-08 | End: 2019-08-05

## 2019-04-08 RX ORDER — MECLIZINE HCL 12.5 MG/1
12.5 TABLET ORAL 3 TIMES DAILY PRN
Qty: 30 TABLET | Refills: 2 | Status: SHIPPED | OUTPATIENT
Start: 2019-04-08 | End: 2019-04-18

## 2019-04-15 ENCOUNTER — OFFICE VISIT (OUTPATIENT)
Dept: FAMILY MEDICINE CLINIC | Age: 74
End: 2019-04-15
Payer: MEDICARE

## 2019-04-15 ENCOUNTER — HOSPITAL ENCOUNTER (OUTPATIENT)
Age: 74
Discharge: HOME | End: 2019-04-17
Payer: MEDICARE

## 2019-04-15 VITALS
BODY MASS INDEX: 42.16 KG/M2 | RESPIRATION RATE: 18 BRPM | DIASTOLIC BLOOD PRESSURE: 72 MMHG | HEIGHT: 63 IN | HEART RATE: 89 BPM | SYSTOLIC BLOOD PRESSURE: 132 MMHG | OXYGEN SATURATION: 96 %

## 2019-04-15 DIAGNOSIS — J44.9 CHRONIC OBSTRUCTIVE PULMONARY DISEASE, UNSPECIFIED COPD TYPE (HCC): ICD-10-CM

## 2019-04-15 DIAGNOSIS — E03.9 HYPOTHYROIDISM, UNSPECIFIED TYPE: ICD-10-CM

## 2019-04-15 DIAGNOSIS — M15.9 PRIMARY OSTEOARTHRITIS INVOLVING MULTIPLE JOINTS: Primary | ICD-10-CM

## 2019-04-15 DIAGNOSIS — E11.9 DIABETES MELLITUS, TYPE II, INSULIN DEPENDENT (HCC): ICD-10-CM

## 2019-04-15 DIAGNOSIS — E55.9 VITAMIN D DEFICIENCY: ICD-10-CM

## 2019-04-15 DIAGNOSIS — Z79.4 TYPE 2 DIABETES MELLITUS WITH DIABETIC NEPHROPATHY, WITH LONG-TERM CURRENT USE OF INSULIN (HCC): ICD-10-CM

## 2019-04-15 DIAGNOSIS — M15.9 PRIMARY OSTEOARTHRITIS INVOLVING MULTIPLE JOINTS: ICD-10-CM

## 2019-04-15 DIAGNOSIS — E11.40 TYPE 2 DIABETES MELLITUS WITH DIABETIC NEUROPATHY, WITH LONG-TERM CURRENT USE OF INSULIN (HCC): ICD-10-CM

## 2019-04-15 DIAGNOSIS — K21.9 GASTROESOPHAGEAL REFLUX DISEASE, ESOPHAGITIS PRESENCE NOT SPECIFIED: ICD-10-CM

## 2019-04-15 DIAGNOSIS — Z23 NEED FOR PROPHYLACTIC VACCINATION AGAINST STREPTOCOCCUS PNEUMONIAE (PNEUMOCOCCUS): ICD-10-CM

## 2019-04-15 DIAGNOSIS — Z79.4 TYPE 2 DIABETES MELLITUS WITH DIABETIC NEUROPATHY, WITH LONG-TERM CURRENT USE OF INSULIN (HCC): ICD-10-CM

## 2019-04-15 DIAGNOSIS — Z79.4 DIABETES MELLITUS, TYPE II, INSULIN DEPENDENT (HCC): ICD-10-CM

## 2019-04-15 DIAGNOSIS — E11.21 TYPE 2 DIABETES MELLITUS WITH DIABETIC NEPHROPATHY, WITH LONG-TERM CURRENT USE OF INSULIN (HCC): ICD-10-CM

## 2019-04-15 DIAGNOSIS — N18.30 CHRONIC KIDNEY DISEASE, STAGE III (MODERATE) (HCC): ICD-10-CM

## 2019-04-15 LAB
ALBUMIN SERPL-MCNC: 3.5 G/DL (ref 3.5–5.2)
ALP BLD-CCNC: 77 U/L (ref 35–104)
ALT SERPL-CCNC: 13 U/L (ref 0–32)
ANION GAP SERPL CALCULATED.3IONS-SCNC: 12 MMOL/L (ref 7–16)
AST SERPL-CCNC: 12 U/L (ref 0–31)
BASOPHILS ABSOLUTE: 0.02 E9/L (ref 0–0.2)
BASOPHILS RELATIVE PERCENT: 0.3 % (ref 0–2)
BILIRUB SERPL-MCNC: 0.3 MG/DL (ref 0–1.2)
BUN BLDV-MCNC: 60 MG/DL (ref 8–23)
CALCIUM SERPL-MCNC: 9.2 MG/DL (ref 8.6–10.2)
CHLORIDE BLD-SCNC: 102 MMOL/L (ref 98–107)
CHOLESTEROL, TOTAL: 148 MG/DL (ref 0–199)
CO2: 26 MMOL/L (ref 22–29)
CREAT SERPL-MCNC: 1.8 MG/DL (ref 0.5–1)
EOSINOPHILS ABSOLUTE: 0.04 E9/L (ref 0.05–0.5)
EOSINOPHILS RELATIVE PERCENT: 0.6 % (ref 0–6)
GFR AFRICAN AMERICAN: 33
GFR NON-AFRICAN AMERICAN: 33 ML/MIN/1.73
GLUCOSE BLD-MCNC: 231 MG/DL (ref 74–99)
HBA1C MFR BLD: 10.5 % (ref 4–5.6)
HCT VFR BLD CALC: 40.6 % (ref 34–48)
HDLC SERPL-MCNC: 49 MG/DL
HEMOGLOBIN: 12.3 G/DL (ref 11.5–15.5)
IMMATURE GRANULOCYTES #: 0.05 E9/L
IMMATURE GRANULOCYTES %: 0.7 % (ref 0–5)
LDL CHOLESTEROL CALCULATED: 81 MG/DL (ref 0–99)
LYMPHOCYTES ABSOLUTE: 1.25 E9/L (ref 1.5–4)
LYMPHOCYTES RELATIVE PERCENT: 17.2 % (ref 20–42)
MCH RBC QN AUTO: 26.9 PG (ref 26–35)
MCHC RBC AUTO-ENTMCNC: 30.3 % (ref 32–34.5)
MCV RBC AUTO: 88.6 FL (ref 80–99.9)
MONOCYTES ABSOLUTE: 0.67 E9/L (ref 0.1–0.95)
MONOCYTES RELATIVE PERCENT: 9.2 % (ref 2–12)
NEUTROPHILS ABSOLUTE: 5.24 E9/L (ref 1.8–7.3)
NEUTROPHILS RELATIVE PERCENT: 72 % (ref 43–80)
PDW BLD-RTO: 16.6 FL (ref 11.5–15)
PLATELET # BLD: 151 E9/L (ref 130–450)
PMV BLD AUTO: 12.5 FL (ref 7–12)
POTASSIUM SERPL-SCNC: 4.9 MMOL/L (ref 3.5–5)
RBC # BLD: 4.58 E12/L (ref 3.5–5.5)
SODIUM BLD-SCNC: 140 MMOL/L (ref 132–146)
TOTAL PROTEIN: 6.3 G/DL (ref 6.4–8.3)
TRIGL SERPL-MCNC: 91 MG/DL (ref 0–149)
TSH SERPL DL<=0.05 MIU/L-ACNC: 1.47 UIU/ML (ref 0.27–4.2)
VITAMIN D 25-HYDROXY: 52 NG/ML (ref 30–100)
VLDLC SERPL CALC-MCNC: 18 MG/DL
WBC # BLD: 7.3 E9/L (ref 4.5–11.5)

## 2019-04-15 PROCEDURE — 85025 COMPLETE CBC W/AUTO DIFF WBC: CPT

## 2019-04-15 PROCEDURE — 3023F SPIROM DOC REV: CPT | Performed by: FAMILY MEDICINE

## 2019-04-15 PROCEDURE — G8926 SPIRO NO PERF OR DOC: HCPCS | Performed by: FAMILY MEDICINE

## 2019-04-15 PROCEDURE — 99214 OFFICE O/P EST MOD 30 MIN: CPT | Performed by: FAMILY MEDICINE

## 2019-04-15 PROCEDURE — G8427 DOCREV CUR MEDS BY ELIG CLIN: HCPCS | Performed by: FAMILY MEDICINE

## 2019-04-15 PROCEDURE — G8598 ASA/ANTIPLAT THER USED: HCPCS | Performed by: FAMILY MEDICINE

## 2019-04-15 PROCEDURE — 84443 ASSAY THYROID STIM HORMONE: CPT

## 2019-04-15 PROCEDURE — 2022F DILAT RTA XM EVC RTNOPTHY: CPT | Performed by: FAMILY MEDICINE

## 2019-04-15 PROCEDURE — 4040F PNEUMOC VAC/ADMIN/RCVD: CPT | Performed by: FAMILY MEDICINE

## 2019-04-15 PROCEDURE — G0009 ADMIN PNEUMOCOCCAL VACCINE: HCPCS | Performed by: FAMILY MEDICINE

## 2019-04-15 PROCEDURE — 3017F COLORECTAL CA SCREEN DOC REV: CPT | Performed by: FAMILY MEDICINE

## 2019-04-15 PROCEDURE — 83036 HEMOGLOBIN GLYCOSYLATED A1C: CPT

## 2019-04-15 PROCEDURE — 82306 VITAMIN D 25 HYDROXY: CPT

## 2019-04-15 PROCEDURE — G8399 PT W/DXA RESULTS DOCUMENT: HCPCS | Performed by: FAMILY MEDICINE

## 2019-04-15 PROCEDURE — 36415 COLL VENOUS BLD VENIPUNCTURE: CPT | Performed by: FAMILY MEDICINE

## 2019-04-15 PROCEDURE — 3046F HEMOGLOBIN A1C LEVEL >9.0%: CPT | Performed by: FAMILY MEDICINE

## 2019-04-15 PROCEDURE — 80061 LIPID PANEL: CPT

## 2019-04-15 PROCEDURE — 1090F PRES/ABSN URINE INCON ASSESS: CPT | Performed by: FAMILY MEDICINE

## 2019-04-15 PROCEDURE — 90732 PPSV23 VACC 2 YRS+ SUBQ/IM: CPT | Performed by: FAMILY MEDICINE

## 2019-04-15 PROCEDURE — 1123F ACP DISCUSS/DSCN MKR DOCD: CPT | Performed by: FAMILY MEDICINE

## 2019-04-15 PROCEDURE — G8417 CALC BMI ABV UP PARAM F/U: HCPCS | Performed by: FAMILY MEDICINE

## 2019-04-15 PROCEDURE — 1036F TOBACCO NON-USER: CPT | Performed by: FAMILY MEDICINE

## 2019-04-15 PROCEDURE — 80053 COMPREHEN METABOLIC PANEL: CPT

## 2019-04-15 RX ORDER — FAMOTIDINE 20 MG/1
20 TABLET, FILM COATED ORAL 2 TIMES DAILY
Qty: 60 TABLET | Refills: 3 | Status: SHIPPED | OUTPATIENT
Start: 2019-04-15 | End: 2019-05-22

## 2019-04-15 ASSESSMENT — ENCOUNTER SYMPTOMS
SORE THROAT: 0
COUGH: 1
VOMITING: 0
WHEEZING: 0
ALLERGIC/IMMUNOLOGIC NEGATIVE: 1
APNEA: 0
NAUSEA: 0
BLOOD IN STOOL: 0
COLOR CHANGE: 0
CHEST TIGHTNESS: 0
SHORTNESS OF BREATH: 0
ABDOMINAL PAIN: 0
DIARRHEA: 0
BACK PAIN: 0
FACIAL SWELLING: 0
PHOTOPHOBIA: 0
SINUS PRESSURE: 0

## 2019-04-15 ASSESSMENT — COPD QUESTIONNAIRES: COPD: 1

## 2019-04-15 NOTE — PROGRESS NOTES
Chief Complaint:   Chief Complaint   Patient presents with    COPD     here for a check up. Feeling better since last visit.  Diabetes    Hyperlipidemia       COPD   She complains of cough. There is no shortness of breath or wheezing. This is a new problem. The problem occurs daily. The problem has been gradually worsening. Pertinent negatives include no chest pain, headaches, myalgias or sore throat. Diabetes   She presents for her follow-up diabetic visit. She has type 2 diabetes mellitus. Her disease course has been stable. Pertinent negatives for hypoglycemia include no confusion, headaches or nervousness/anxiousness. Pertinent negatives for diabetes include no chest pain and no weakness. Symptoms are stable. Current diabetic treatment includes insulin injections. She is compliant with treatment all of the time. Hyperlipidemia   This is a chronic problem. The current episode started more than 1 year ago. The problem is controlled. Recent lipid tests were reviewed and are normal. Pertinent negatives include no chest pain, myalgias or shortness of breath. Current antihyperlipidemic treatment includes statins. The current treatment provides significant improvement of lipids.        Patient Active Problem List   Diagnosis    Acute on chronic systolic congestive heart failure (HCC)    Chronic atrial fibrillation ([address]    HTN (hypertension)    Asthma    Hyperlipidemia    CAD (coronary artery disease)    Chronic kidney disease, stage III (moderate) (HCC)    Hypothyroidism    Anxiety    Gastroesophageal reflux disease    COPD (chronic obstructive pulmonary disease) ([address]    Diverticular disease    Vitamin D deficiency    Osteoporosis    STACEY (obstructive sleep apnea)    Peripheral neuropathy    Adenoma of left adrenal gland    Generalized OA    LVH (left ventricular hypertrophy)    Pulmonary hypertension (HCC)    Valvular heart disease    Congestive heart failure, NYHA class 3 and ACC/AHA stage C (HCC)    Left atrial dilation    Elevated plasma metanephrines    Albuminuria    Primary osteoarthritis of both knees    Dry eye    Hiatal hernia    Fecal incontinence    Primary osteoarthritis involving multiple joints    CHF, acute on chronic (HCC)    History of asthma    Dizziness    Chronic kidney disease    Type 2 diabetes mellitus with diabetic nephropathy ([facility]    CHF (congestive heart failure), NYHA class I, acute on chronic, combined ([facility])    Dyspnea on exertion    Morbid obesity ([facility])       Past Medical History:   Diagnosis Date    Adenoma of left adrenal gland     DARVIN (acute kidney injury) ([address]     Albuminuria     Anterior myocardial infarction ([address]     Anxiety 1/16/2017    Asthma     Atrial fibrillation ([facility])     CAD (coronary artery disease)     CKD (chronic kidney disease) stage 3, GFR 30-59 ml/min ([facility])     Congestive heart failure, NYHA class 3 and ACC/AHA stage C ([address] 1/16/2017    COPD (chronic obstructive pulmonary disease) ([facility])     Dementia     patient denies, per Dr Omalley Agent, will remove Dx    Diabetes mellitus ([address]     Diverticular disease     Diverticulitis     Dry eye     [facility] Elevated plasma metanephrines     Esophagitis, [facility] grade A 12/2013    Fecal incontinence 5/9/2017    Dr Palma Valentino Fibromyalgia     Gastritis     Gastroesophageal reflux disease without esophagitis 1/16/2017    Generalized OA     GI bleed     Hiatal hernia     4 cm-Dr Palma Valentino History of methicillin resistant Staphylococcus aureus infection 12/1/2011    Hyperlipidemia     Hyperplastic colon polyp 2008    Hypertension     Hypokalemia     Hypothyroidism     Iron deficiency anemia     Left atrial dilation 2/14/2017    Lumbar herniated disc     LVH (left ventricular hypertrophy) 1/16/2017    Microalbuminuria 4/22/2012    STACEY (obstructive sleep apnea)     currently not using CPAP    Osteoporosis     Peripheral neuropathy  Post laminectomy syndrome     Primary osteoarthritis involving multiple joints 2017    Primary osteoarthritis of both knees 3/3/2017    Proteinuria     Pulmonary hypertension ([address] 2017    Syncope     Tobacco abuse     Valvular heart disease 2017    Dilated RV, thickened MV, mod-severe MR    Vitamin D deficiency        Past Surgical History:   Procedure Laterality Date    APPENDECTOMY      reportedly done at time of last  in      BACK SURGERY  07/2003    x2    CARDIAC [facility]HETERIZATION  2012    [facility]    CARDIOVASCULAR STRESS TEST  , ,    [address] Bilateral     done at [facility]      x4    COLONOSCOPY  2014, 2008, 2003    [facility] Dr Arteaga Cos polyp, sigmoid diverticula, external hemorrhoid-repeat 5 yrs (2019)    CORONARY ANGIOPLASTY WITH STENT PLACEMENT  2013    [facility]    CORONARY ARTERY BYPASS GRAFT      quadruple bypass @ [address] [place] [address]  2016    LVH EF 45-50%   Luann Hawk      Dr. Ulrich Centers in [address] [place] [address]  5/20/15, 2013, 13    Dr [address] @ [email or web address]  2017    Dr Tobar-GERD/HIATAL HERNIA/gastritis       Current Outpatient Medications   Medication Sig Dispense Refill    famotidine (PEPCID) 20 MG tablet Take 1 tablet by mouth 2 times daily 60 tablet 3    meclizine (ANTIVERT) 12.5 MG tablet Take 1 tablet by mouth 3 times daily as needed for Dizziness 30 tablet 2    Blood Glucose Monitoring Suppl (ONE TOUCH ULTRA MINI) w/Device KIT 1 kit by Does not apply route daily as needed (diabetes) 1 kit 0    blood glucose test strips (ASCENSIA AUTODISC VI;ONE TOUCH ULTRA TEST VI) strip 1 each by In Vitro route daily As needed.  100 each 3    TRUEPLUS LANCETS 33G MISC Test blood sugars three times daily E11.9 100 each 5    albuterol sulfate  (90 Base) MCG/ACT inhaler Inhale 2 puffs into the lungs every 6 hours as needed for Wheezing 3 Inhaler 4    traMADol (ULTRAM) 50 MG tablet Take 1 tablet by mouth every 6 hours as needed for Pain for up to 30 days. 90 tablet 1    potassium chloride (KLOR-CON) 20 MEQ packet Take 1 tablet by mouth only if extra bumex is taken for weight gain of 2 lbs in 2 days 30 each 5    levothyroxine (LEVOTHROID) 75 MCG tablet Take 1 tablet by mouth daily 30 tablet 5    atorvastatin (LIPITOR) 40 MG tablet Take 1 tablet by mouth daily 30 tablet 5    apixaban (ELIQUIS) 5 MG TABS tablet Take 1 tablet by mouth 2 times daily 60 tablet 5    carvedilol (COREG) 25 MG tablet Take 1 tablet by mouth 2 times daily (with meals) 60 tablet 5    insulin glargine (LANTUS SOLOSTAR) 100 UNIT/ML injection pen Inject 55 Units into the skin nightly 10 pen 5    lisinopril (PRINIVIL;ZESTRIL) 20 MG tablet Take 1 tablet by mouth 2 times daily 60 tablet 5    fluticasone (FLONASE) 50 MCG/ACT nasal spray 2 sprays by Nasal route every morning 1 Bottle 5    hydrALAZINE (APRESOLINE) 50 MG tablet Take 1 tablet by mouth 3 times daily 90 tablet 5    bumetanide (BUMEX) 2 MG tablet Take 1 tablet by mouth 2 times daily 60 tablet 3    Alcohol Swabs (ALCOHOL PREP) PADS 1 applicator by Does not apply route three times daily 100 each 1    Insulin Pen Needle (BD PEN NEEDLE IMELDA U/F) 32G X 4 MM MISC 1 each by Does not apply route 5 times daily 300 each 4    insulin aspart (NOVOLOG FLEXPEN) 100 UNIT/ML injection pen Inject 15 Units into the skin 2 times daily 5 pen 3    GLUCOSAMINE SULFATE PO Take 500 mg by mouth daily      ipratropium-albuterol (DUONEB) 0.5-2.5 (3) MG/3ML SOLN nebulizer solution Inhale 3 mLs into the lungs every 4 hours 810 mL 4    nitroGLYCERIN (NITROSTAT) 0.4 MG SL tablet Place 1 tablet under the tongue every 5 minutes as needed for Chest pain up to max of 3 total doses.  If no relief after 1 dose, call 911. 75 tablet 4    acetaminophen (TYLENOL) 500 MG tablet Take 1,000 mg by mouth every 6 hours as needed for Pain       Misc. Devices (RAISED TOILET SEAT) MISC For OA involving multiple sites 1 each 0    aspirin 81 MG tablet Take 81 mg by mouth every morning       famotidine (PEPCID) 20 MG tablet Take 1 tablet by mouth 2 times daily 60 tablet 5    Coenzyme Q10 (COQ-10 PO) Take 1 capsule by mouth daily        No current facility-administered medications for this visit. 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 drinks 2 glasses of  diet coke daily, occasional peach ice tea.      Drug use: No     Comment: denies    Sexual activity: Not Currently     Partners: Male   Lifestyle    Physical activity:     Days per week: None     Minutes per session: None    Stress: None Relationships    Social connections:     Talks on phone: None     Gets together: None     Attends [employer] service: None     Active member of club or organization: None     Attends meetings of clubs or organizations: None     Relationship status: None    Intimate partner violence:     Fear of current or ex partner: None     Emotionally abused: None     Physically abused: None     Forced sexual activity: None   Other Topics Concern    None   Social History Narrative    Moved from [address] to [facility] and lives with daughter Amada Maya        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 She had smoked for over 50 years, and smoked on average 1 pack every three days    She denies alcohol or illicit drug use     She lives with her daughter             Family History: Mother  of breast cancer and was a diabetic    One sister with history of diabetes       Family History   Problem Relation Age of Onset   [facility] Breast Cancer Mother     Diabetes Mother     Stroke Maternal Grandmother     Diabetes Maternal Grandmother     Diabetes Sister          Review of Systems   Constitutional: Negative. HENT: Negative for congestion, facial swelling, hearing loss, nosebleeds, sinus pressure and sore throat. Eyes: Negative for photophobia and visual disturbance. Respiratory: Positive for cough. Negative for apnea, chest tightness, shortness of breath and wheezing. Cardiovascular: Negative for chest pain, palpitations and leg swelling. Gastrointestinal: Negative for abdominal pain, blood in stool, diarrhea, nausea and vomiting. Genitourinary: Negative for difficulty urinating, frequency and urgency. Musculoskeletal: Negative for arthralgias, back pain, joint swelling and myalgias. Skin: Negative for color change and rash. Allergic/Immunologic: Negative.

## 2019-04-16 ENCOUNTER — TELEPHONE (OUTPATIENT)
Dept: FAMILY MEDICINE CLINIC | Age: 74
End: 2019-04-16

## 2019-04-16 DIAGNOSIS — R26.9 GAIT DIFFICULTY: ICD-10-CM

## 2019-04-16 DIAGNOSIS — M15.9 PRIMARY OSTEOARTHRITIS INVOLVING MULTIPLE JOINTS: Primary | ICD-10-CM

## 2019-04-16 NOTE — TELEPHONE ENCOUNTER
Mariola Luong from [facility] on aging is requesting a PTeval  script for in home eval for Karl Bill.  She wants a ramp put on her appartment but they need proof she needs one    [employer] #---[phone]      Fax it to Mariola Luong ---[phone]

## 2019-04-22 ENCOUNTER — HOSPITAL ENCOUNTER (OUTPATIENT)
Dept: ULTRASOUND IMAGING | Age: 74
Discharge: HOME | End: 2019-04-24
Payer: MEDICARE

## 2019-04-22 DIAGNOSIS — E04.1 THYROID NODULE: ICD-10-CM

## 2019-04-22 PROCEDURE — 76536 US EXAM OF HEAD AND NECK: CPT

## 2019-04-23 RX ORDER — PEN NEEDLE, DIABETIC 31 GX5/16"
1 NEEDLE, DISPOSABLE MISCELLANEOUS 3 TIMES DAILY
Qty: 100 EACH | Refills: 1 | Status: SHIPPED | OUTPATIENT
Start: 2019-04-23 | End: 2019-05-02 | Stop reason: SDUPTHER

## 2019-04-23 RX ORDER — POTASSIUM CHLORIDE 1.5 G/1.77G
POWDER, FOR SOLUTION ORAL
Qty: 30 EACH | Refills: 5 | Status: SHIPPED | OUTPATIENT
Start: 2019-04-23 | End: 2019-05-01 | Stop reason: SDUPTHER

## 2019-04-29 ENCOUNTER — OFFICE VISIT (OUTPATIENT)
Dept: ENT CLINIC | Age: 74
End: 2019-04-29
Payer: MEDICARE

## 2019-04-29 VITALS
DIASTOLIC BLOOD PRESSURE: 63 MMHG | HEART RATE: 69 BPM | HEIGHT: 64 IN | BODY MASS INDEX: 40.63 KG/M2 | WEIGHT: 238 LBS | RESPIRATION RATE: 14 BRPM | SYSTOLIC BLOOD PRESSURE: 115 MMHG

## 2019-04-29 DIAGNOSIS — E04.1 THYROID NODULE: Primary | ICD-10-CM

## 2019-04-29 PROCEDURE — 99213 OFFICE O/P EST LOW 20 MIN: CPT | Performed by: OTOLARYNGOLOGY

## 2019-04-29 PROCEDURE — G8427 DOCREV CUR MEDS BY ELIG CLIN: HCPCS | Performed by: OTOLARYNGOLOGY

## 2019-04-29 PROCEDURE — 1123F ACP DISCUSS/DSCN MKR DOCD: CPT | Performed by: OTOLARYNGOLOGY

## 2019-04-29 PROCEDURE — G8417 CALC BMI ABV UP PARAM F/U: HCPCS | Performed by: OTOLARYNGOLOGY

## 2019-04-29 PROCEDURE — 4040F PNEUMOC VAC/ADMIN/RCVD: CPT | Performed by: OTOLARYNGOLOGY

## 2019-04-29 PROCEDURE — G8399 PT W/DXA RESULTS DOCUMENT: HCPCS | Performed by: OTOLARYNGOLOGY

## 2019-04-29 PROCEDURE — G8598 ASA/ANTIPLAT THER USED: HCPCS | Performed by: OTOLARYNGOLOGY

## 2019-04-29 PROCEDURE — 1036F TOBACCO NON-USER: CPT | Performed by: OTOLARYNGOLOGY

## 2019-04-29 PROCEDURE — 1090F PRES/ABSN URINE INCON ASSESS: CPT | Performed by: OTOLARYNGOLOGY

## 2019-04-29 PROCEDURE — 3017F COLORECTAL CA SCREEN DOC REV: CPT | Performed by: OTOLARYNGOLOGY

## 2019-04-29 NOTE — PROGRESS NOTES
[facility] Otolaryngology  Dr. Winston Butt. Carlita Claudiocynthia. Ms.Ed        Patient Name:  Marko Small  :       CHIEF C/O:    Chief Complaint   Patient presents with    Results     pt here for results on ultrasound thyroid. HISTORY OBTAINED FROM:  patient    HISTORY OF PRESENT ILLNESS:       Seamus Fong is a 76y.o. year old female, here today for follow up of history of a thyroid nodule. Patient underwent repeat thyroid ultrasound, no new complaints of difficulty swallowing changes  was brought for  1. Resulting thyroid ultrasound review today, otherwise no complaint. TINNITUS vertigo nasal congestion fever or chills. No changes in medical therapy and currently is taking Synthroid daily.       Past Medical History:   Diagnosis Date    Adenoma of left adrenal gland     DARVIN (acute kidney injury) ([address]     Albuminuria     Anterior myocardial infarction ([address]     Anxiety 2017    Asthma     Atrial fibrillation (HCC)     CAD (coronary artery disease)     CKD (chronic kidney disease) stage 3, GFR 30-59 ml/min ([facility]     Congestive heart failure, NYHA class 3 and ACC/AHA stage C ([address] 2017    COPD (chronic obstructive pulmonary disease) (HCC)     Dementia     patient denies, per Dr Ady Morales, will remove Dx    Diabetes mellitus ([address]     Diverticular disease     Diverticulitis     Dry eye     follows with Dr. Dee Castillo at [facility] Elevated plasma metanephrines     Esophagitis, [facility] grade A 2013    Fecal incontinence 2017    Dr Kenyatta Herrmann Fibromyalgia     Gastritis     Gastroesophageal reflux disease without esophagitis 2017    Generalized OA     GI bleed     Hiatal hernia     4 cm-Dr Kenyatta Herrmann History of methicillin resistant Staphylococcus aureus infection 2011    Hyperlipidemia     Hyperplastic colon polyp     Hypertension     Hypokalemia     Hypothyroidism     Iron deficiency anemia     Left atrial dilation 2017    Lumbar herniated disc Test blood sugars three times daily E11.9, Disp: 100 each, Rfl: 5    albuterol sulfate  (90 Base) MCG/ACT inhaler, Inhale 2 puffs into the lungs every 6 hours as needed for Wheezing, Disp: 3 Inhaler, Rfl: 4    levothyroxine (LEVOTHROID) 75 MCG tablet, Take 1 tablet by mouth daily, Disp: 30 tablet, Rfl: 5    atorvastatin (LIPITOR) 40 MG tablet, Take 1 tablet by mouth daily, Disp: 30 tablet, Rfl: 5    apixaban (ELIQUIS) 5 MG TABS tablet, Take 1 tablet by mouth 2 times daily, Disp: 60 tablet, Rfl: 5    carvedilol (COREG) 25 MG tablet, Take 1 tablet by mouth 2 times daily (with meals), Disp: 60 tablet, Rfl: 5    insulin glargine (LANTUS SOLOSTAR) 100 UNIT/ML injection pen, Inject 55 Units into the skin nightly (Patient taking differently: Inject 40 Units into the skin nightly ), Disp: 10 pen, Rfl: 5    famotidine (PEPCID) 20 MG tablet, Take 1 tablet by mouth 2 times daily, Disp: 60 tablet, Rfl: 5    lisinopril (PRINIVIL;ZESTRIL) 20 MG tablet, Take 1 tablet by mouth 2 times daily, Disp: 60 tablet, Rfl: 5    fluticasone (FLONASE) 50 MCG/ACT nasal spray, 2 sprays by Nasal route every morning, Disp: 1 Bottle, Rfl: 5    hydrALAZINE (APRESOLINE) 50 MG tablet, Take 1 tablet by mouth 3 times daily, Disp: 90 tablet, Rfl: 5    bumetanide (BUMEX) 2 MG tablet, Take 1 tablet by mouth 2 times daily, Disp: 60 tablet, Rfl: 3    Insulin Pen Needle (BD PEN NEEDLE IMELDA U/F) 32G X 4 MM MISC, 1 each by Does not apply route 5 times daily, Disp: 300 each, Rfl: 4    insulin aspart (NOVOLOG FLEXPEN) 100 UNIT/ML injection pen, Inject 15 Units into the skin 2 times daily (Patient taking differently: Inject 20 Units into the skin 3 times daily (before meals) ), Disp: 5 pen, Rfl: 3    GLUCOSAMINE SULFATE PO, Take 500 mg by mouth daily, Disp: , Rfl:     Coenzyme Q10 (COQ-10 PO), Take 1 capsule by mouth daily , Disp: , Rfl:     ipratropium-albuterol (DUONEB) 0.5-2.5 (3) MG/3ML SOLN nebulizer solution, Inhale 3 mLs into the lungs every 4 hours, Disp: 810 mL, Rfl: 4    nitroGLYCERIN (NITROSTAT) 0.4 MG SL tablet, Place 1 tablet under the tongue every 5 minutes as needed for Chest pain up to max of 3 total doses. If no relief after 1 dose, call 911., Disp: 75 tablet, Rfl: 4    acetaminophen (TYLENOL) 500 MG tablet, Take 1,000 mg by mouth every 6 hours as needed for Pain , Disp: , Rfl:     Misc. Devices (RAISED TOILET SEAT) MISC, For OA involving multiple sites, Disp: 1 each, Rfl: 0    aspirin 81 MG tablet, Take 81 mg by mouth every morning , Disp: , Rfl:     Alcohol Swabs (ALCOHOL PREP) PADS, 1 applicator by Does not apply route three times daily, Disp: 100 each, Rfl: 1    potassium chloride (KLOR-CON) 20 MEQ packet, Take 1 tablet by mouth twice daily, Disp: 60 each, Rfl: 5  Iv [iodides]; Codeine; Cymbalta [duloxetine hcl]; Gabapentin; Hydrocodone; Morphine; Pradaxa [dabigatran etexilate mesylate]; Oxycontin [oxycodone hcl]; and Penicillins  Social History     Tobacco Use    Smoking status: Former Smoker     Packs/day: 0.30     Years: 35.00     Pack years: 10.50     Types: Cigarettes     Start date: 8/22/1961     Last attempt to quit: 2016     Years since quitting: 3.3    Smokeless tobacco: Never Used    Tobacco comment: started at age 14-most smoked was 1 pack every 3 days and quit in 3/2013   Substance Use Topics    Alcohol use: No     Comment: rarely. drinks 2 glasses of  diet coke daily, occasional peach ice tea.  Drug use: No     Comment: denies     Family History   Problem Relation Age of Onset   Tarri Dillan Breast Cancer Mother     Diabetes Mother     Stroke Maternal Grandmother     Diabetes Maternal Grandmother     Diabetes Sister        Review of Systems   Constitutional: Negative for chills and fever. HENT: Negative for ear discharge and hearing loss. Respiratory: Negative for cough and shortness of breath. Cardiovascular: Negative for chest pain and palpitations. Gastrointestinal: Negative for vomiting. Skin: Negative for rash. Allergic/Immunologic: Negative for environmental allergies. Neurological: Negative for dizziness and headaches. Hematological: Does not bruise/bleed easily. All other systems reviewed and are negative. /63   Pulse 69   Resp 14   Ht 5' 3.5\" (1.613 m)   Wt 238 lb (108 kg)   BMI 41.50 kg/m²   Physical Exam   Constitutional: She appears well-developed and well-nourished. HENT:   Head: Normocephalic and atraumatic. Right Ear: Tympanic membrane, external ear and ear canal normal.   Left Ear: Tympanic membrane, external ear and ear canal normal.   Nose: Mucosal edema and rhinorrhea present. Mouth/Throat: Mucous membranes are not dry and not cyanotic. Normal dentition. No dental caries. No posterior oropharyngeal erythema or tonsillar abscesses. Eyes: Pupils are equal, round, and reactive to light. EOM are normal.   Neck: Normal range of motion. Neck supple. No tracheal deviation present. Thyromegaly present. Cardiovascular: Normal rate and intact distal pulses. Pulmonary/Chest: Effort normal. No respiratory distress. Musculoskeletal: Normal range of motion. She exhibits no tenderness. Neurological: She is alert. No cranial nerve deficit. Skin: Skin is warm and dry. Nursing note and vitals reviewed. IMPRESSION/PLAN:  Patient with a left-sided thyroid nodule that is not obstructive and is currently not causing any symptoms from a obstructive standpoint. Recommendations are for observation, as patient is a poor surgical candidate due to a significant heart condition. Follow up with otolaryngology when necessary, recommend repeat ultrasound in 2-3 years. Dr. Dodson Spillers D. Bunevich D.O. Ms. Oneda Lefort.  Otolaryngology Facial Plastic Surgery  :  [address] [place] [address] Otolaryngology/Facial Plastic Surgery Residency  Associate Clinical Professor:  Vipin Hunter, [facility]

## 2019-05-01 RX ORDER — POTASSIUM CHLORIDE 1.5 G/1.77G
POWDER, FOR SOLUTION ORAL
Qty: 60 EACH | Refills: 5 | Status: ON HOLD | OUTPATIENT
Start: 2019-05-01 | End: 2019-09-30 | Stop reason: HOSPADM

## 2019-05-02 ENCOUNTER — OFFICE VISIT (OUTPATIENT)
Dept: CARDIOLOGY CLINIC | Age: 74
End: 2019-05-02
Payer: MEDICARE

## 2019-05-02 VITALS
BODY MASS INDEX: 42.1 KG/M2 | RESPIRATION RATE: 18 BRPM | DIASTOLIC BLOOD PRESSURE: 70 MMHG | HEIGHT: 63 IN | WEIGHT: 237.6 LBS | SYSTOLIC BLOOD PRESSURE: 112 MMHG | HEART RATE: 90 BPM

## 2019-05-02 DIAGNOSIS — I48.20 CHRONIC ATRIAL FIBRILLATION (HCC): Primary | Chronic | ICD-10-CM

## 2019-05-02 PROCEDURE — 1123F ACP DISCUSS/DSCN MKR DOCD: CPT | Performed by: INTERNAL MEDICINE

## 2019-05-02 PROCEDURE — 3017F COLORECTAL CA SCREEN DOC REV: CPT | Performed by: INTERNAL MEDICINE

## 2019-05-02 PROCEDURE — G8417 CALC BMI ABV UP PARAM F/U: HCPCS | Performed by: INTERNAL MEDICINE

## 2019-05-02 PROCEDURE — 99214 OFFICE O/P EST MOD 30 MIN: CPT | Performed by: INTERNAL MEDICINE

## 2019-05-02 PROCEDURE — G8399 PT W/DXA RESULTS DOCUMENT: HCPCS | Performed by: INTERNAL MEDICINE

## 2019-05-02 PROCEDURE — 4040F PNEUMOC VAC/ADMIN/RCVD: CPT | Performed by: INTERNAL MEDICINE

## 2019-05-02 PROCEDURE — 93000 ELECTROCARDIOGRAM COMPLETE: CPT | Performed by: INTERNAL MEDICINE

## 2019-05-02 PROCEDURE — 1090F PRES/ABSN URINE INCON ASSESS: CPT | Performed by: INTERNAL MEDICINE

## 2019-05-02 PROCEDURE — 1036F TOBACCO NON-USER: CPT | Performed by: INTERNAL MEDICINE

## 2019-05-02 PROCEDURE — G8427 DOCREV CUR MEDS BY ELIG CLIN: HCPCS | Performed by: INTERNAL MEDICINE

## 2019-05-02 PROCEDURE — G8598 ASA/ANTIPLAT THER USED: HCPCS | Performed by: INTERNAL MEDICINE

## 2019-05-02 RX ORDER — PEN NEEDLE, DIABETIC 31 GX5/16"
1 NEEDLE, DISPOSABLE MISCELLANEOUS 3 TIMES DAILY
Qty: 100 EACH | Refills: 1 | Status: SHIPPED | OUTPATIENT
Start: 2019-05-02 | End: 2019-09-11 | Stop reason: SDUPTHER

## 2019-05-02 NOTE — PROGRESS NOTES
Dr. Colt Abraham at [facility] Elevated plasma metanephrines     Esophagitis, [facility] grade [facility] 12/2013    Fecal incontinence 5/9/2017    Dr Jesús Villafuerte Fibromyalgia     Gastritis     Gastroesophageal reflux disease without esophagitis 1/16/2017    Generalized OA     GI bleed     Hiatal hernia     4 cm-Dr Jesús Villafuerte History of methicillin resistant Staphylococcus aureus infection 12/1/2011    Hyperlipidemia     Hyperplastic colon polyp 2008    Hypertension     Hypokalemia     Hypothyroidism     Iron deficiency anemia     Left atrial dilation 2/14/2017    Lumbar herniated disc     LVH (left ventricular hypertrophy) 1/16/2017    Microalbuminuria 4/22/2012    STACEY (obstructive sleep apnea)     currently not using CPAP    Osteoporosis     Peripheral neuropathy     Post laminectomy syndrome     Primary osteoarthritis involving multiple joints 5/11/2017    Primary osteoarthritis of both knees 3/3/2017    Proteinuria     Pulmonary hypertension ([address] 1/16/2017    Syncope     Tobacco abuse     Valvular heart disease 1/16/2017    Dilated RV, thickened MV, mod-severe MR    Vitamin D deficiency        Patient Active Problem List   Diagnosis    Acute on chronic systolic congestive heart failure (HCC)    Chronic atrial fibrillation ([address]    HTN (hypertension)    Asthma    Hyperlipidemia    CAD (coronary artery disease)    Chronic kidney disease, stage III (moderate) (HCC)    Hypothyroidism    Anxiety    Gastroesophageal reflux disease    COPD (chronic obstructive pulmonary disease) ([address]    Diverticular disease    Vitamin D deficiency    Osteoporosis    STACEY (obstructive sleep apnea)    Peripheral neuropathy    Adenoma of left adrenal gland    Generalized OA    LVH (left ventricular hypertrophy)    Pulmonary hypertension (HCC)    Valvular heart disease    Congestive heart failure, NYHA class 3 and ACC/AHA stage C (HCC)    Left atrial dilation    Elevated plasma metanephrines    Albuminuria    Primary osteoarthritis of both knees    Dry eye    Hiatal hernia    Fecal incontinence    Primary osteoarthritis involving multiple joints    [facility] on chronic [facility])    History of asthma    Dizziness    Chronic kidney disease    Type 2 diabetes mellitus with diabetic nephropathy ([facility])    CHF (congestive heart failure), NYHA class I, acute on chronic, combined ([facility])    Dyspnea on exertion    Morbid obesity ([facility])       Allergies   Allergen Reactions    Iv [Iodides] Anaphylaxis     uncertain    Codeine     Cymbalta [Duloxetine Hcl] Swelling    Gabapentin Swelling    Hydrocodone     Morphine     Pradaxa [Dabigatran Etexilate Mesylate] Other (See Comments)     Bleeding episode    Oxycontin [Oxycodone Hcl] Anxiety    Penicillins Rash       Current Outpatient Medications   Medication Sig Dispense Refill    potassium chloride (KLOR-CON) 20 MEQ packet Take 1 tablet by mouth twice daily 60 each 5    Alcohol Swabs (ALCOHOL PREP) PADS 1 applicator by Does not apply route three times daily 100 each 1    Blood Glucose Monitoring Suppl (ONE TOUCH ULTRA MINI) w/Device KIT 1 kit by Does not apply route daily as needed (diabetes) 1 kit 0    blood glucose test strips (ASCENSIA AUTODISC VI;ONE TOUCH ULTRA TEST VI) strip 1 each by In Vitro route daily As needed. 100 each 3    TRUEPLUS LANCETS 33G MISC Test blood sugars three times daily E11.9 100 each 5    albuterol sulfate  (90 Base) MCG/ACT inhaler Inhale 2 puffs into the lungs every 6 hours as needed for Wheezing 3 Inhaler 4    traMADol (ULTRAM) 50 MG tablet Take 1 tablet by mouth every 6 hours as needed for Pain for up to 30 days.  90 tablet 1    levothyroxine (LEVOTHROID) 75 MCG tablet Take 1 tablet by mouth daily 30 tablet 5    atorvastatin (LIPITOR) 40 MG tablet Take 1 tablet by mouth daily 30 tablet 5    apixaban (ELIQUIS) 5 MG TABS tablet Take 1 tablet by mouth 2 times daily 60 tablet 5    carvedilol (COREG) 25 MG tablet Take 1 tablet by mouth 2 times daily (with meals) 60 tablet 5    insulin glargine (LANTUS SOLOSTAR) 100 UNIT/ML injection pen Inject 55 Units into the skin nightly (Patient taking differently: Inject 40 Units into the skin nightly ) 10 pen 5    famotidine (PEPCID) 20 MG tablet Take 1 tablet by mouth 2 times daily 60 tablet 5    lisinopril (PRINIVIL;ZESTRIL) 20 MG tablet Take 1 tablet by mouth 2 times daily 60 tablet 5    fluticasone (FLONASE) 50 MCG/ACT nasal spray 2 sprays by Nasal route every morning 1 Bottle 5    hydrALAZINE (APRESOLINE) 50 MG tablet Take 1 tablet by mouth 3 times daily 90 tablet 5    bumetanide (BUMEX) 2 MG tablet Take 1 tablet by mouth 2 times daily 60 tablet 3    Insulin Pen Needle (BD PEN NEEDLE IMELDA U/F) 32G X 4 MM MISC 1 each by Does not apply route 5 times daily 300 each 4    insulin aspart (NOVOLOG FLEXPEN) 100 UNIT/ML injection pen Inject 15 Units into the skin 2 times daily (Patient taking differently: Inject 20 Units into the skin 3 times daily (before meals) ) 5 pen 3    GLUCOSAMINE SULFATE PO Take 500 mg by mouth daily      ipratropium-albuterol (DUONEB) 0.5-2.5 (3) MG/3ML SOLN nebulizer solution Inhale 3 mLs into the lungs every 4 hours 810 mL 4    nitroGLYCERIN (NITROSTAT) 0.4 MG SL tablet Place 1 tablet under the tongue every 5 minutes as needed for Chest pain up to max of 3 total doses. If no relief after 1 dose, call 911. 75 tablet 4    acetaminophen (TYLENOL) 500 MG tablet Take 1,000 mg by mouth every 6 hours as needed for Pain       Misc. Devices (RAISED TOILET SEAT) MISC For OA involving multiple sites 1 each 0    aspirin 81 MG tablet Take 81 mg by mouth every morning       famotidine (PEPCID) 20 MG tablet Take 1 tablet by mouth 2 times daily 60 tablet 3    Coenzyme Q10 (COQ-10 PO) Take 1 capsule by mouth daily        No current facility-administered medications for this visit.         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 [employer] resource strain: Not on file    Food insecurity:     Worry: Not on file     Inability: Not on file   [employer] needs:     Medical: Not on file     Non-medical: Not on file   Tobacco Use    Smoking status: Former Smoker     Packs/day: 0.30     Years: 35.00     Pack years: 10.50     Types: Cigarettes     Start date: 8/22/1961     Last attempt to quit: 2016     Years since quitting: 3.3    Smokeless tobacco: Never Used    Tobacco comment: started at age 14-most smoked was 1 pack every 3 days and quit in 3/2013   Substance and Sexual Activity    Alcohol use: No     Comment: rarely. drinks 2 glasses of  diet coke daily, occasional peach ice tea.      Drug use: No     Comment: denies    Sexual activity: Not Currently     Partners: Male   Lifestyle    Physical activity:     Days per week: Not on file     Minutes per session: Not on file    Stress: Not on file   Relationships    Social connections:     Talks on phone: Not on file     Gets together: Not on file     Attends Sikh service: Not on file     Active member of club or organization: Not on file     Attends meetings of clubs or organizations: Not on file     Relationship status: Not on file    Intimate partner violence:     Fear of current or ex partner: Not on file     Emotionally abused: Not on file     Physically abused: Not on file     Forced sexual activity: Not on file   Other Topics Concern    Not on file   Social History Narrative    Moved from [place], [place] to [place] and lives with daughter Camden Copier        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   Skin: Skin is warm and dry. No rash noted. Not diaphoretic. No erythema. Psychiatric: Normal mood and affect. Behavior is normal.     EKG:  Afib, nonspecific ST and T waves changes.     Echo Summary 2/3/17:  [facility] left atrium is severely dilated.   Atrial fibrillation   Elevated L atrial pressure   Ejection fraction biplane = 47%.   The left atrium is severely dilated.   Atrial fibrillation   Elevated L atrial pressure   Moderate eccentric mitral regurgitation .   Pulmonary hypertension is moderate .   No pericardial effusion. Echo Summary 1/24/18:   Ejection fraction biplane =53%.   The left atrium is severely dilated.   Atrial fibrillation   Elevated L atrial pressure   Severe posteriorly directed eccentric mitral regurgitation.   Probable MV posterior leaflet restriction   The aortic valve appears mildly sclerotic.   No pericardial effusion.     ASSESSMENT AND PLAN:  Patient Active Problem List   Diagnosis    Acute on chronic systolic congestive heart failure (HCC)    Chronic atrial fibrillation (HCC)    HTN (hypertension)    Asthma    Hyperlipidemia    CAD (coronary artery disease)    [facility], [facility]    Hypothyroidism    Anxiety    Gastroesophageal reflux disease    COPD (chronic obstructive pulmonary disease) [facility]    Diverticular disease    Vitamin D deficiency    Osteoporosis    STACEY (obstructive sleep apnea)    Peripheral neuropathy    Adenoma of left adrenal gland    Generalized OA    LVH (left ventricular hypertrophy)    Pulmonary hypertension (HCC)    Valvular heart disease    Congestive heart failure, NYHA class 3 and ACC/AHA stage C (HCC)    Left atrial dilation    Elevated plasma metanephrines    Albuminuria    Primary osteoarthritis of both knees    Dry eye    Hiatal hernia    Fecal incontinence    Primary osteoarthritis involving multiple joints    CHF, acute on chronic (HCC)    History of asthma    Dizziness    Chronic kidney disease    Type 2 diabetes mellitus with diabetic nephropathy (HCC)    CHF (congestive heart failure), NYHA class I, acute on chronic, combined (HCC)    Dyspnea on exertion    Morbid obesity ([address]     1. CAD-CABG:   Stable symptoms in regards to angina. BB/ASA/statin/nitrate/Ranexa. 2. CHF:   Stable symptoms and edema. Echo as above. Coreg/ACEI/hydralazine/nitrates. Continue bumex to BID. 3. Chronic Afib: Eliquis and rate controlled. 4. VHD:     Echo suggest severe eccentric MR with low normal LVEF and normal LV size. Observe for now. Patient is frail and deconditioned and a MV intervention would be a redo. Eventual consideration for Mitraclip? 5. Renal failure: Sees Dr. Lj Michaud. 6. HTN: Observe. Elvia Moran D.O.   Cardiologist  Cardiology, [id number]

## 2019-05-09 ASSESSMENT — ENCOUNTER SYMPTOMS
VOMITING: 0
SHORTNESS OF BREATH: 0
COUGH: 0

## 2019-05-22 ENCOUNTER — HOSPITAL ENCOUNTER (EMERGENCY)
Age: 74
Discharge: HOME | End: 2019-05-22
Payer: MEDICARE

## 2019-05-22 ENCOUNTER — APPOINTMENT (OUTPATIENT)
Dept: GENERAL RADIOLOGY | Age: 74
End: 2019-05-22
Payer: MEDICARE

## 2019-05-22 VITALS
DIASTOLIC BLOOD PRESSURE: 90 MMHG | HEART RATE: 101 BPM | SYSTOLIC BLOOD PRESSURE: 152 MMHG | TEMPERATURE: 98 F | OXYGEN SATURATION: 95 % | HEIGHT: 64 IN | BODY MASS INDEX: 40.12 KG/M2 | WEIGHT: 235 LBS | RESPIRATION RATE: 16 BRPM

## 2019-05-22 DIAGNOSIS — S93.601A SPRAIN OF RIGHT FOOT, INITIAL ENCOUNTER: Primary | ICD-10-CM

## 2019-05-22 DIAGNOSIS — M85.871 OSTEOPENIA OF RIGHT ANKLE: ICD-10-CM

## 2019-05-22 DIAGNOSIS — S93.401A SPRAIN OF RIGHT ANKLE, UNSPECIFIED LIGAMENT, INITIAL ENCOUNTER: ICD-10-CM

## 2019-05-22 PROCEDURE — 99283 EMERGENCY DEPT VISIT LOW MDM: CPT

## 2019-05-22 PROCEDURE — 73630 X-RAY EXAM OF FOOT: CPT

## 2019-05-22 PROCEDURE — 73610 X-RAY EXAM OF ANKLE: CPT

## 2019-05-22 RX ORDER — ACETAMINOPHEN 500 MG
1000 TABLET ORAL EVERY 6 HOURS PRN
Qty: 20 TABLET | Refills: 0 | Status: ON HOLD | OUTPATIENT
Start: 2019-05-22 | End: 2019-09-30 | Stop reason: HOSPADM

## 2019-05-22 RX ORDER — TRAMADOL HYDROCHLORIDE 50 MG/1
50 TABLET ORAL EVERY 6 HOURS PRN
COMMUNITY
End: 2019-06-28 | Stop reason: SDUPTHER

## 2019-05-22 ASSESSMENT — PAIN DESCRIPTION - DESCRIPTORS: DESCRIPTORS: CONSTANT;DISCOMFORT;ACHING;THROBBING

## 2019-05-22 ASSESSMENT — PAIN DESCRIPTION - PROGRESSION: CLINICAL_PROGRESSION: GRADUALLY WORSENING

## 2019-05-22 ASSESSMENT — PAIN DESCRIPTION - ONSET: ONSET: GRADUAL

## 2019-05-22 ASSESSMENT — PAIN DESCRIPTION - FREQUENCY: FREQUENCY: CONTINUOUS

## 2019-05-22 ASSESSMENT — PAIN DESCRIPTION - ORIENTATION: ORIENTATION: RIGHT

## 2019-05-22 ASSESSMENT — PAIN SCALES - GENERAL: PAINLEVEL_OUTOF10: 10

## 2019-05-22 ASSESSMENT — PAIN DESCRIPTION - PAIN TYPE: TYPE: ACUTE PAIN

## 2019-05-22 ASSESSMENT — PAIN DESCRIPTION - LOCATION: LOCATION: FOOT

## 2019-05-22 NOTE — ED PROVIDER NOTES
she quit smoking about 3 years ago. Her smoking use included cigarettes. She started smoking about 57 years ago. She has a 10.50 pack-year smoking history. She has never used smokeless tobacco. She reports that she does not drink alcohol or use drugs. Family History: family history includes Breast Cancer in her mother; Diabetes in her maternal grandmother, mother, and sister; Stroke in her maternal grandmother. Allergies: Iv [iodides]; Codeine; Cymbalta [duloxetine hcl]; Gabapentin; Hydrocodone; Morphine; Pradaxa [dabigatran etexilate mesylate]; Oxycontin [oxycodone hcl]; and Penicillins    Physical Exam           ED Triage Vitals   BP Temp Temp Source Pulse Resp SpO2 Height Weight   05/22/19 1126 05/22/19 1126 05/22/19 1126 05/22/19 1126 05/22/19 1126 05/22/19 1126 05/22/19 1127 05/22/19 1127   (!) 152/90 98 °F (36.7 °C) Oral 101 16 95 % 5' 3.5\" (1.613 m) 235 lb (106.6 kg)       Oxygen Saturation Interpretation: Normal.    Constitutional:  Alert, development consistent with age. Neck:  Normal ROM. Supple. Ankle:  Right Lateral:              Tenderness:  mild. Swelling: Mild. Deformity: no.             ROM: diminished range with pain. Skin:  tenderness, swelling and ecchymosis. Neurovascular: Motor deficit: none. Sensory deficit:   none. Pulse deficit: none. Capillary refill: normal.  Knee:              Tenderness:  none. Swelling: None. Deformity: no.             ROM: full range of motion. Skin:  no erythema, rash or wounds noted. Foot:              Tenderness:  mild. Lateral aspect             Swelling: None. Deformity: no.             ROM: full range of motion. Skin:  no erythema, rash or wounds noted. Gait:  normal.   Lymphatics: No lymphangitis or adenopathy noted. Neurological:  Oriented. Motor functions intact.     Lab / Imaging Results   (All

## 2019-05-29 ENCOUNTER — TELEPHONE (OUTPATIENT)
Dept: FAMILY MEDICINE CLINIC | Age: 74
End: 2019-05-29

## 2019-05-29 DIAGNOSIS — R53.1 GENERAL WEAKNESS: Primary | ICD-10-CM

## 2019-05-29 NOTE — TELEPHONE ENCOUNTER
Amos Chung with [facility] [place] [id number] (formerly [facility] on [facility])    Angela Horan    Stated that an assessment was done and patient will benefit from a shower chair, will you please send a script to [address] [place] [address]?    DX:  General weakness      Please fax to [facility] [phone].

## 2019-05-31 ENCOUNTER — OFFICE VISIT (OUTPATIENT)
Dept: FAMILY MEDICINE CLINIC | Age: 74
End: 2019-05-31
Payer: MEDICARE

## 2019-05-31 VITALS
HEART RATE: 93 BPM | BODY MASS INDEX: 41.82 KG/M2 | RESPIRATION RATE: 18 BRPM | SYSTOLIC BLOOD PRESSURE: 148 MMHG | HEIGHT: 63 IN | OXYGEN SATURATION: 94 % | WEIGHT: 236 LBS | DIASTOLIC BLOOD PRESSURE: 88 MMHG

## 2019-05-31 DIAGNOSIS — Z78.0 POST-MENOPAUSAL: ICD-10-CM

## 2019-05-31 DIAGNOSIS — M85.80 OSTEOPENIA, UNSPECIFIED LOCATION: ICD-10-CM

## 2019-05-31 DIAGNOSIS — S93.401A SPRAIN OF RIGHT ANKLE, UNSPECIFIED LIGAMENT, INITIAL ENCOUNTER: Primary | ICD-10-CM

## 2019-05-31 PROCEDURE — G8399 PT W/DXA RESULTS DOCUMENT: HCPCS | Performed by: FAMILY MEDICINE

## 2019-05-31 PROCEDURE — G8598 ASA/ANTIPLAT THER USED: HCPCS | Performed by: FAMILY MEDICINE

## 2019-05-31 PROCEDURE — G8417 CALC BMI ABV UP PARAM F/U: HCPCS | Performed by: FAMILY MEDICINE

## 2019-05-31 PROCEDURE — 4040F PNEUMOC VAC/ADMIN/RCVD: CPT | Performed by: FAMILY MEDICINE

## 2019-05-31 PROCEDURE — G8427 DOCREV CUR MEDS BY ELIG CLIN: HCPCS | Performed by: FAMILY MEDICINE

## 2019-05-31 PROCEDURE — 99213 OFFICE O/P EST LOW 20 MIN: CPT | Performed by: FAMILY MEDICINE

## 2019-05-31 PROCEDURE — 1036F TOBACCO NON-USER: CPT | Performed by: FAMILY MEDICINE

## 2019-05-31 PROCEDURE — 1090F PRES/ABSN URINE INCON ASSESS: CPT | Performed by: FAMILY MEDICINE

## 2019-05-31 PROCEDURE — 1123F ACP DISCUSS/DSCN MKR DOCD: CPT | Performed by: FAMILY MEDICINE

## 2019-05-31 PROCEDURE — 3017F COLORECTAL CA SCREEN DOC REV: CPT | Performed by: FAMILY MEDICINE

## 2019-05-31 RX ORDER — MECLIZINE HCL 12.5 MG/1
TABLET ORAL
Qty: 60 TABLET | Refills: 2 | Status: SHIPPED | OUTPATIENT
Start: 2019-05-31 | End: 2019-08-23

## 2019-05-31 RX ORDER — METHYLPREDNISOLONE 4 MG/1
TABLET ORAL
Qty: 1 KIT | Refills: 0 | Status: SHIPPED | OUTPATIENT
Start: 2019-05-31 | End: 2019-09-11

## 2019-05-31 RX ORDER — MECLIZINE HCL 12.5 MG/1
TABLET ORAL
Refills: 2 | COMMUNITY
Start: 2019-05-07 | End: 2019-05-31 | Stop reason: SDUPTHER

## 2019-05-31 ASSESSMENT — ENCOUNTER SYMPTOMS
SINUS PRESSURE: 0
WHEEZING: 0
ALLERGIC/IMMUNOLOGIC NEGATIVE: 1
ABDOMINAL PAIN: 0
COLOR CHANGE: 0
CHEST TIGHTNESS: 0
SORE THROAT: 0
VOMITING: 0
FACIAL SWELLING: 0
BACK PAIN: 0
COUGH: 0
SHORTNESS OF BREATH: 0
NAUSEA: 0
PHOTOPHOBIA: 0
APNEA: 0
BLOOD IN STOOL: 0
DIARRHEA: 0

## 2019-05-31 ASSESSMENT — PATIENT HEALTH QUESTIONNAIRE - PHQ9
SUM OF ALL RESPONSES TO PHQ9 QUESTIONS 1 & 2: 0
1. LITTLE INTEREST OR PLEASURE IN DOING THINGS: 0
SUM OF ALL RESPONSES TO PHQ QUESTIONS 1-9: 0
SUM OF ALL RESPONSES TO PHQ QUESTIONS 1-9: 0
2. FEELING DOWN, DEPRESSED OR HOPELESS: 0

## 2019-06-04 ENCOUNTER — OFFICE VISIT (OUTPATIENT)
Dept: ORTHOPEDIC SURGERY | Age: 74
End: 2019-06-04
Payer: MEDICARE

## 2019-06-04 VITALS — SYSTOLIC BLOOD PRESSURE: 139 MMHG | TEMPERATURE: 97 F | DIASTOLIC BLOOD PRESSURE: 80 MMHG | HEART RATE: 81 BPM

## 2019-06-04 DIAGNOSIS — M20.011 MALLET FINGER OF RIGHT HAND: ICD-10-CM

## 2019-06-04 DIAGNOSIS — S99.911A INJURY OF RIGHT ANKLE, INITIAL ENCOUNTER: Primary | ICD-10-CM

## 2019-06-04 PROCEDURE — G8417 CALC BMI ABV UP PARAM F/U: HCPCS | Performed by: ORTHOPAEDIC SURGERY

## 2019-06-04 PROCEDURE — G8598 ASA/ANTIPLAT THER USED: HCPCS | Performed by: ORTHOPAEDIC SURGERY

## 2019-06-04 PROCEDURE — 1036F TOBACCO NON-USER: CPT | Performed by: ORTHOPAEDIC SURGERY

## 2019-06-04 PROCEDURE — 3017F COLORECTAL CA SCREEN DOC REV: CPT | Performed by: ORTHOPAEDIC SURGERY

## 2019-06-04 PROCEDURE — 1123F ACP DISCUSS/DSCN MKR DOCD: CPT | Performed by: ORTHOPAEDIC SURGERY

## 2019-06-04 PROCEDURE — 99213 OFFICE O/P EST LOW 20 MIN: CPT | Performed by: ORTHOPAEDIC SURGERY

## 2019-06-04 PROCEDURE — G8399 PT W/DXA RESULTS DOCUMENT: HCPCS | Performed by: ORTHOPAEDIC SURGERY

## 2019-06-04 PROCEDURE — A4570 SPLINT: HCPCS | Performed by: ORTHOPAEDIC SURGERY

## 2019-06-04 PROCEDURE — 4040F PNEUMOC VAC/ADMIN/RCVD: CPT | Performed by: ORTHOPAEDIC SURGERY

## 2019-06-04 PROCEDURE — G8427 DOCREV CUR MEDS BY ELIG CLIN: HCPCS | Performed by: ORTHOPAEDIC SURGERY

## 2019-06-04 PROCEDURE — 1090F PRES/ABSN URINE INCON ASSESS: CPT | Performed by: ORTHOPAEDIC SURGERY

## 2019-06-04 NOTE — PROGRESS NOTES
Chief Complaint:   Chief Complaint   Patient presents with    Ankle Injury     Rt foot / ankle injury 5/20/2019. Sat in chair when she experienced back pain while doing dishes. Felt pain Rt foot and ankle when she got up out of chair. X-rays @ [facility]. Given ace wrap and aircast.    Finger Pain     Pain Rt middle finger       Rohini Yi  presents with right ankle and right middle finger pain. No specific injury, ankle pain seems to have occurred after periods of standing in the kitchen, seems to be associated with some recurring back and radicular symptoms as well. Also complains of some recent aggravation of a chronic right middle mallet finger no new injury there. No fever chills sweats other systemic symptoms. Knee pain is beginning to return secondary to her previously noted DJD. She had x-rays done of the ankle elsewhere that were negative. Allergies; medications; past medical, surgical, family, and social history; and problem list have been reviewed today and updated as indicated in this encounter seen below. Exam: Right middle finger shows chronic mallet deformity that is passively correctable with limited active extension. No acute inflammation however. Right ankle shows stiffness limiting her to neutral dorsiflexion, lateral soft tissue swelling and tenderness consistent with probable lateral sprain. Radiographs: I reviewed x-rays of the right ankle in Epic, there is soft tissue swelling but no orders for fracture or significant degenerative disease. Nena Ly was seen today for ankle injury and finger pain.     Diagnoses and all orders for this visit:    Injury of right ankle, initial encounter  -     MI ANKLE CONTROL ORTHO PRE OTS    Mallet finger of right hand  -     MI SPLINT       Patient was fitted with an air sport brace advised on its proper use also provided with a Stax splint for her mallet finger, show moderate activities to tolerance, bear weight as tolerated, follow up here as previously scheduled in about 3 weeks repeat exam possible injection one or both knees. Return in about 3 weeks (around 6/27/2019). Current Outpatient Medications   Medication Sig Dispense Refill    meclizine (ANTIVERT) 12.5 MG tablet TAKE 1 TABLET BY MOUTH 3 TIMES A DAY AS NEEDED. FOR DIZZINESS 60 tablet 2    methylPREDNISolone (MEDROL, KACY,) 4 MG tablet Take by mouth. 1 kit 0    traMADol (ULTRAM) 50 MG tablet Take 50 mg by mouth every 6 hours as needed for Pain.  acetaminophen (APAP EXTRA STRENGTH) 500 MG tablet Take 2 tablets by mouth every 6 hours as needed for Pain 20 tablet 0    Alcohol Swabs (ALCOHOL PREP) PADS 1 applicator by Does not apply route three times daily 100 each 1    potassium chloride (KLOR-CON) 20 MEQ packet Take 1 tablet by mouth twice daily 60 each 5    blood glucose test strips (ASCENSIA AUTODISC VI;ONE TOUCH ULTRA TEST VI) strip 1 each by In Vitro route daily As needed.  100 each 3    TRUEPLUS LANCETS 33G MISC Test blood sugars three times daily E11.9 100 each 5    albuterol sulfate  (90 Base) MCG/ACT inhaler Inhale 2 puffs into the lungs every 6 hours as needed for Wheezing 3 Inhaler 4    levothyroxine (LEVOTHROID) 75 MCG tablet Take 1 tablet by mouth daily 30 tablet 5    atorvastatin (LIPITOR) 40 MG tablet Take 1 tablet by mouth daily 30 tablet 5    apixaban (ELIQUIS) 5 MG TABS tablet Take 1 tablet by mouth 2 times daily 60 tablet 5    carvedilol (COREG) 25 MG tablet Take 1 tablet by mouth 2 times daily (with meals) 60 tablet 5    insulin glargine (LANTUS SOLOSTAR) 100 UNIT/ML injection pen Inject 55 Units into the skin nightly (Patient taking differently: Inject 40 Units into the skin nightly ) 10 pen 5    famotidine (PEPCID) 20 MG tablet Take 1 tablet by mouth 2 times daily 60 tablet 5    lisinopril (PRINIVIL;ZESTRIL) 20 MG tablet Take 1 tablet by mouth 2 times daily 60 tablet 5    fluticasone (FLONASE) 50 MCG/ACT nasal spray 2 sprays by Nasal route every morning 1 Bottle 5    hydrALAZINE (APRESOLINE) 50 MG tablet Take 1 tablet by mouth 3 times daily 90 tablet 5    bumetanide (BUMEX) 2 MG tablet Take 1 tablet by mouth 2 times daily 60 tablet 3    Insulin Pen Needle (BD PEN NEEDLE IMELDA U/F) 32G X 4 MM MISC 1 each by Does not apply route 5 times daily 300 each 4    insulin aspart (NOVOLOG FLEXPEN) 100 UNIT/ML injection pen Inject 15 Units into the skin 2 times daily (Patient taking differently: Inject 20 Units into the skin 3 times daily (before meals) ) 5 pen 3    GLUCOSAMINE SULFATE PO Take 500 mg by mouth daily      Coenzyme Q10 (COQ-10 PO) Take 1 capsule by mouth daily       ipratropium-albuterol (DUONEB) 0.5-2.5 (3) MG/3ML SOLN nebulizer solution Inhale 3 mLs into the lungs every 4 hours 810 mL 4    nitroGLYCERIN (NITROSTAT) 0.4 MG SL tablet Place 1 tablet under the tongue every 5 minutes as needed for Chest pain up to max of 3 total doses. If no relief after 1 dose, call 911. 75 tablet 4    Misc. Devices (RAISED TOILET SEAT) MISC For OA involving multiple sites 1 each 0    aspirin 81 MG tablet Take 81 mg by mouth every morning       Blood Glucose Monitoring Suppl (ONE TOUCH ULTRA MINI) w/Device KIT 1 kit by Does not apply route daily as needed (diabetes) 1 kit 0     No current facility-administered medications for this visit.         Patient Active Problem List   Diagnosis    Acute on chronic systolic congestive heart failure (HCC)    [facility]    HTN (hypertension)    Asthma    Hyperlipidemia    CAD (coronary artery disease)    Chronic kidney disease, stage III (moderate) (HCC)    Hypothyroidism    Anxiety    Gastroesophageal reflux disease    COPD (chronic obstructive pulmonary disease) ([facility]    Diverticular disease    Vitamin D deficiency    Osteoporosis    STACEY (obstructive sleep apnea)    Peripheral neuropathy    Adenoma of left adrenal gland    Generalized OA    LVH (left ventricular hypertrophy)    Pulmonary hypertension (HCC)    Valvular heart disease    Congestive heart failure, NYHA class 3 and ACC/AHA stage C (HCC)    Left atrial dilation    Elevated plasma metanephrines    Albuminuria    Primary osteoarthritis of both knees    Dry eye    Hiatal hernia    Fecal incontinence    Primary osteoarthritis involving multiple joints    CHF, acute on chronic (HCC)    History of asthma    Dizziness    Chronic kidney disease    Type 2 diabetes mellitus with diabetic nephropathy (HCC)    CHF (congestive heart failure), NYHA class I, acute on chronic, combined ([facility])    Dyspnea on exertion    Morbid obesity ([address]       Past Medical History:   Diagnosis Date    Adenoma of left adrenal gland     DARVIN (acute kidney injury) ([address]     Albuminuria     Anterior myocardial infarction ([address]     Anxiety 1/16/2017    Asthma     Atrial fibrillation (HCC)     CAD (coronary artery disease)     CKD (chronic kidney disease) stage 3, GFR 30-59 ml/min ([facility])     Congestive heart failure, NYHA class 3 and ACC/AHA stage C ([address] 1/16/2017    COPD (chronic obstructive pulmonary disease) ([facility])     Dementia     patient denies, per Dr Floresita Lindsey, will remove Dx    Diabetes mellitus ([address]     Diverticular disease     Diverticulitis     Dry eye     follow[facility] Stacey Cutler [facility] Elevated plasma metanephrines     Esophagitis, [facility] grade A 12/2013    Fecal incontinence 5/9/2017    Dr Milan Agarwal Fibromyalgia     Gastritis     Gastroesophageal reflux disease without esophagitis 1/16/2017    Generalized OA     GI bleed     Hiatal hernia     4 cm-Dr Milan Agarwal History of methicillin resistant Staphylococcus aureus infection 12/1/2011    Hyperlipidemia     Hyperplastic colon polyp 2008    Hypertension     Hypokalemia     Hypothyroidism     Iron deficiency anemia     Left atrial dilation 2/14/2017    Lumbar herniated disc     LVH (left ventricular hypertrophy) 1/16/2017    Microalbuminuria 2012    STACEY (obstructive sleep apnea)     currently not using CPAP    Osteoporosis     Peripheral neuropathy     Post laminectomy syndrome     Primary osteoarthritis involving multiple joints 2017    Primary osteoarthritis of both knees 3/3/2017    Proteinuria     Pulmonary hypertension ([address] 2017    Syncope     Tobacco abuse     Valvular heart disease 2017    Dilated RV, thickened MV, mod-severe MR    Vitamin D deficiency        Past Surgical History:   Procedure Laterality Date    APPENDECTOMY      reportedly done at time of last  in 1968     BACK SURGERY  07/2003    x2    [facility]ATION  [facility]    CARDIOVASCULAR STRESS TEST  , ,     CARPAL TUNNEL RELEASE Bilateral     done at [facility]      x4    COLONOSCOPY  2014, 2008, 2003    [facility] Dr Elijah Valencia-hyperplastic polyp, sigmoid diverticula, external hemorrhoid-repeat 5 yrs (2019)    CORONARY ANGIOPLASTY WITH STENT PLACEMENT  2013    [facility]    CORONARY ARTERY BYPASS GRAFT  1997    quadruple bypass @ [facility]  2016    LVH EF 45-50%    LUMBAR SPINE SURGERY      Dr. Lara Noss in [address] [place] [address] ENDOSCOPY  5/20/15, 2013, 13    Dr Elias Dorman @ [address]  2017    Dr Tobar-GERD/HIATAL HERNIA/gastritis       Allergies   Allergen Reactions    Iv [Iodides] Anaphylaxis     uncertain    Codeine     Cymbalta [Duloxetine Hcl] Swelling    Gabapentin Swelling    Hydrocodone     Morphine     Pradaxa [Dabigatran Etexilate Mesylate] Other (See Comments)     Bleeding episode    Oxycontin [Oxycodone Hcl] Anxiety    Penicillins Rash       Social History     Socioeconomic History    Marital status:      Spouse name: None    Number of children: 3    Years of education: None    Highest education level: None diabetic    One sister with history of diabetes       Family History   Problem Relation Age of Onset   Mercedes Hash Breast Cancer Mother     Diabetes Mother     Stroke Maternal Grandmother     Diabetes Maternal Grandmother     Diabetes Sister          Review of Systems  As follows except as previously noted in HPI:  Constitutional: Negative for chills, diaphoresis, fatigue, fever and unexpected weight change. Respiratory: Negative for cough, shortness of breath and wheezing. Cardiovascular: Negative for chest pain and palpitations. Neurological: Negative for dizziness, syncope, cephalgia. GI / : negative  Musculoskeletal: see HPI       Objective:   Physical Exam   Constitutional: Oriented to person, place, and time. and appears well-developed and well-nourished. :   Head: Normocephalic and atraumatic. Eyes: EOM are normal.   Neck: Neck supple. Cardiovascular: Normal rate and regular rhythm. Pulmonary/Chest: Effort normal. No stridor. No respiratory distress, no wheezes. Abdominal:  No abnormal distension. Neurological: Alert and oriented to person, place, and time. Skin: Skin is warm and dry. Psychiatric: Normal mood and affect.  Behavior is normal. Thought content normal.    6/4/2019  1:17 PM

## 2019-06-04 NOTE — PROGRESS NOTES
An airsport  ankle brace was applied to Her Right ankle(s). Use and care of the brace were reviewed with the patient. The patient denied any issues with fit or comfort of the braces  Patient instructed to call our office if there are any issues with the braces. Stax splint fitted to right middle finger.

## 2019-06-07 ENCOUNTER — TELEPHONE (OUTPATIENT)
Dept: FAMILY MEDICINE CLINIC | Age: 74
End: 2019-06-07

## 2019-06-07 DIAGNOSIS — R26.9 GAIT DIFFICULTY: Primary | ICD-10-CM

## 2019-06-18 ENCOUNTER — TELEPHONE (OUTPATIENT)
Dept: FAMILY MEDICINE CLINIC | Age: 74
End: 2019-06-18

## 2019-06-18 DIAGNOSIS — I50.23 ACUTE ON CHRONIC SYSTOLIC CONGESTIVE HEART FAILURE (HCC): ICD-10-CM

## 2019-06-18 RX ORDER — BUMETANIDE 2 MG/1
2 TABLET ORAL 2 TIMES DAILY
Qty: 60 TABLET | Refills: 3 | Status: SHIPPED | OUTPATIENT
Start: 2019-06-18 | End: 2019-11-26 | Stop reason: SDUPTHER

## 2019-06-27 ENCOUNTER — OFFICE VISIT (OUTPATIENT)
Dept: ORTHOPEDIC SURGERY | Age: 74
End: 2019-06-27
Payer: MEDICARE

## 2019-06-27 VITALS
DIASTOLIC BLOOD PRESSURE: 82 MMHG | SYSTOLIC BLOOD PRESSURE: 140 MMHG | HEART RATE: 83 BPM | BODY MASS INDEX: 40.12 KG/M2 | TEMPERATURE: 97.3 F | HEIGHT: 64 IN | WEIGHT: 235 LBS

## 2019-06-27 DIAGNOSIS — M17.0 PRIMARY OSTEOARTHRITIS OF BOTH KNEES: Primary | ICD-10-CM

## 2019-06-27 DIAGNOSIS — S99.911D RIGHT ANKLE INJURY, SUBSEQUENT ENCOUNTER: ICD-10-CM

## 2019-06-27 PROCEDURE — G8598 ASA/ANTIPLAT THER USED: HCPCS | Performed by: ORTHOPAEDIC SURGERY

## 2019-06-27 PROCEDURE — 20610 DRAIN/INJ JOINT/BURSA W/O US: CPT | Performed by: ORTHOPAEDIC SURGERY

## 2019-06-27 PROCEDURE — 1123F ACP DISCUSS/DSCN MKR DOCD: CPT | Performed by: ORTHOPAEDIC SURGERY

## 2019-06-27 PROCEDURE — 3017F COLORECTAL CA SCREEN DOC REV: CPT | Performed by: ORTHOPAEDIC SURGERY

## 2019-06-27 PROCEDURE — G8417 CALC BMI ABV UP PARAM F/U: HCPCS | Performed by: ORTHOPAEDIC SURGERY

## 2019-06-27 PROCEDURE — 1036F TOBACCO NON-USER: CPT | Performed by: ORTHOPAEDIC SURGERY

## 2019-06-27 PROCEDURE — 99213 OFFICE O/P EST LOW 20 MIN: CPT | Performed by: ORTHOPAEDIC SURGERY

## 2019-06-27 PROCEDURE — 4040F PNEUMOC VAC/ADMIN/RCVD: CPT | Performed by: ORTHOPAEDIC SURGERY

## 2019-06-27 PROCEDURE — G8427 DOCREV CUR MEDS BY ELIG CLIN: HCPCS | Performed by: ORTHOPAEDIC SURGERY

## 2019-06-27 PROCEDURE — G8399 PT W/DXA RESULTS DOCUMENT: HCPCS | Performed by: ORTHOPAEDIC SURGERY

## 2019-06-27 PROCEDURE — 1090F PRES/ABSN URINE INCON ASSESS: CPT | Performed by: ORTHOPAEDIC SURGERY

## 2019-06-27 RX ORDER — TRIAMCINOLONE ACETONIDE 40 MG/ML
80 INJECTION, SUSPENSION INTRA-ARTICULAR; INTRAMUSCULAR ONCE
Status: COMPLETED | OUTPATIENT
Start: 2019-06-27 | End: 2019-06-27

## 2019-06-27 RX ORDER — LIDOCAINE HYDROCHLORIDE 10 MG/ML
3 INJECTION, SOLUTION INFILTRATION; PERINEURAL ONCE
Status: COMPLETED | OUTPATIENT
Start: 2019-06-27 | End: 2019-06-27

## 2019-06-27 RX ADMIN — TRIAMCINOLONE ACETONIDE 80 MG: 40 INJECTION, SUSPENSION INTRA-ARTICULAR; INTRAMUSCULAR at 15:55

## 2019-06-27 RX ADMIN — LIDOCAINE HYDROCHLORIDE 3 ML: 10 INJECTION, SOLUTION INFILTRATION; PERINEURAL at 15:55

## 2019-06-27 NOTE — PROGRESS NOTES
Chief Complaint:   Chief Complaint   Patient presents with    Ankle Injury     FU Rt ankle injury. Has been wearing air sport brace.  Knee Pain     Requesting bilateral knee injections. Last given 1/72/8828       Fransico Casanova presents with primarily complaints of bilateral knee pain, had significant relief following the injections 4 months ago pain is recurring interfering with ambulation transfers and self-care. Right ankle feeling better still using the air sport brace with weightbearing though. She would like to get back to more active ambulation and requests injections today. No fever chills sweats or other systemic symptoms. Allergies; medications; past medical, surgical, family, and social history; and problem list have been reviewed today and updated as indicated in this encounter seen below. Exam: Right ankle shows mild medial and lateral soft tissue swelling stiff on dorsiflexion but intact otherwise without crepitus or effusion. No instability to stress. Knees show non-correctable deformities with synovitis tenderness no appreciable effusion crepitus and range of motion is limited from 0-95 of flexion. Hip motion painless bilaterally. Radiographs: Deferred today previous x-rays are reviewed showing end-stage DJD bilateral knees. [facility] was seen today for ankle injury and knee pain. Diagnoses and all orders for this visit:    Primary osteoarthritis of both knees  -     FL ARTHROCENTESIS ASPIR&/INJ MAJOR JT/BURSA W/O US    Right ankle injury, subsequent encounter    Other orders  -     triamcinolone acetonide (KENALOG-40) injection 80 mg  -     lidocaine 1 % injection 3 mL       Treatment options were reviewed, they are again very limited. Patient will wean herself in the a sport brace if possible, at her request I injected bilateral knees today with Kenalog and lidocaine anteromedial approach is no difficulty or complication tolerated well.   Low impact exercise advised with activity precautions against injury as reviewed. Follow-up in 4 months or as needed. Return in about 4 months (around 10/27/2019). Current Outpatient Medications   Medication Sig Dispense Refill    bumetanide (BUMEX) 2 MG tablet Take 1 tablet by mouth 2 times daily 60 tablet 3    meclizine (ANTIVERT) 12.5 MG tablet TAKE 1 TABLET BY MOUTH 3 TIMES A DAY AS NEEDED. FOR DIZZINESS 60 tablet 2    methylPREDNISolone (MEDROL, KACY,) 4 MG tablet Take by mouth. 1 kit 0    traMADol (ULTRAM) 50 MG tablet Take 50 mg by mouth every 6 hours as needed for Pain.  acetaminophen (APAP EXTRA STRENGTH) 500 MG tablet Take 2 tablets by mouth every 6 hours as needed for Pain 20 tablet 0    Alcohol Swabs (ALCOHOL PREP) PADS 1 applicator by Does not apply route three times daily 100 each 1    potassium chloride (KLOR-CON) 20 MEQ packet Take 1 tablet by mouth twice daily 60 each 5    blood glucose test strips (ASCENSIA AUTODISC VI;ONE TOUCH ULTRA TEST VI) strip 1 each by In Vitro route daily As needed.  100 each 3    TRUEPLUS LANCETS 33G MISC Test blood sugars three times daily E11.9 100 each 5    albuterol sulfate  (90 Base) MCG/ACT inhaler Inhale 2 puffs into the lungs every 6 hours as needed for Wheezing 3 Inhaler 4    levothyroxine (LEVOTHROID) 75 MCG tablet Take 1 tablet by mouth daily 30 tablet 5    atorvastatin (LIPITOR) 40 MG tablet Take 1 tablet by mouth daily 30 tablet 5    apixaban (ELIQUIS) 5 MG TABS tablet Take 1 tablet by mouth 2 times daily 60 tablet 5    carvedilol (COREG) 25 MG tablet Take 1 tablet by mouth 2 times daily (with meals) 60 tablet 5    insulin glargine (LANTUS SOLOSTAR) 100 UNIT/ML injection pen Inject 55 Units into the skin nightly (Patient taking differently: Inject 40 Units into the skin nightly ) 10 pen 5    famotidine (PEPCID) 20 MG tablet Take 1 tablet by mouth 2 times daily 60 tablet 5    lisinopril (PRINIVIL;ZESTRIL) 20 MG tablet Take 1 tablet by mouth 2 times daily 60 tablet 5    fluticasone (FLONASE) 50 MCG/ACT nasal spray 2 sprays by Nasal route every morning 1 Bottle 5    hydrALAZINE (APRESOLINE) 50 MG tablet Take 1 tablet by mouth 3 times daily 90 tablet 5    Insulin Pen Needle (BD PEN NEEDLE IMELDA U/F) 32G X 4 MM MISC 1 each by Does not apply route 5 times daily 300 each 4    insulin aspart (NOVOLOG FLEXPEN) 100 UNIT/ML injection pen Inject 15 Units into the skin 2 times daily (Patient taking differently: Inject 20 Units into the skin 3 times daily (before meals) ) 5 pen 3    GLUCOSAMINE SULFATE PO Take 500 mg by mouth daily      Coenzyme Q10 (COQ-10 PO) Take 1 capsule by mouth daily       ipratropium-albuterol (DUONEB) 0.5-2.5 (3) MG/3ML SOLN nebulizer solution Inhale 3 mLs into the lungs every 4 hours 810 mL 4    nitroGLYCERIN (NITROSTAT) 0.4 MG SL tablet Place 1 tablet under the tongue every 5 minutes as needed for Chest pain up to max of 3 total doses. If no relief after 1 dose, call 911. 75 tablet 4    Misc. Devices (RAISED TOILET SEAT) [facility] For OA involving multiple sites 1 each 0    aspirin 81 MG tablet Take 81 mg by mouth every morning       Blood Glucose Monitoring Suppl (ONE TOUCH ULTRA MINI) w/Device KIT 1 kit by Does not apply route daily as needed (diabetes) 1 kit 0     No current facility-administered medications for this visit.         Patient Active Problem List   Diagnosis    Acute on chronic systolic congestive heart failure (HCC)    Chronic [facility] ([facility]    HTN (hypertension)    Asthma    Hyperlipidemia    CAD (coronary artery disease)    Chronic kidney disease, stage III (moderate) (HCC)    Hypothyroidism    Anxiety    Gastroesophageal reflux disease    COPD (chronic obstructive pulmonary disease) ([facility]    Diverticular disease    Vitamin D deficiency    Osteoporosis    STACEY (obstructive sleep apnea)    Peripheral neuropathy    Adenoma of left adrenal gland    Generalized OA    LVH (left ventricular hypertrophy)    Pulmonary hypertension (HCC)    Valvular heart disease    Congestive heart failure, NYHA class 3 and ACC/AHA stage C (HCC)    Left atrial dilation    Elevated plasma metanephrines    Albuminuria    Primary osteoarthritis of both knees    Dry eye    Hiatal hernia    Fecal incontinence    Primary osteoarthritis involving multiple joints    CHF, acute on chronic (HCC)    History of asthma    Dizziness    Chronic kidney disease    Type 2 diabetes mellitus with diabetic nephropathy (HCC)    CHF (congestive heart failure), NYHA class I, acute on chronic, combined ([facility]    Dyspnea on exertion    Morbid obesity ([address]       Past Medical History:   Diagnosis Date    Adenoma of left adrenal gland     DARVIN (acute kidney injury) ([address]     Albuminuria     Anterior myocardial infarction ([address]     Anxiety 1/16/2017    Asthma     Atrial fibrillation (HCC)     CAD (coronary artery disease)     CKD (chronic kidney disease) stage 3, GFR 30-59 ml/min ([facility]     Congestive heart failure, NYHA class 3 and ACC/AHA stage C ([address] 1/16/2017    COPD (chronic obstructive pulmonary disease) ([facility]     Dementia     patient denies, per Dr Felix Zavala, will remove Dx    Diabetes mellitus ([address]     Diverticular disease     Diverticulitis     Dry eye     follows with Dr. Cammie Rodriguez at [facility] Elevated plasma metanephrines     Esophagitis, [facility] grade A 12/2013    Fecal incontinence 5/9/2017    Dr Kmiberly Reveles Fibromyalgia     Gastritis     Gastroesophageal reflux disease without esophagitis 1/16/2017    Generalized OA     GI bleed     Hiatal hernia     4 cm-Dr Kimberly Reveles History of methicillin resistant Staphylococcus aureus infection 12/1/2011    Hyperlipidemia     Hyperplastic colon polyp 2008    Hypertension     Hypokalemia     Hypothyroidism     Iron deficiency anemia     Left atrial dilation 2/14/2017    Lumbar herniated disc     LVH (left ventricular hypertrophy) 1/16/2017    Microalbuminuria 4/22/2012  STACEY (obstructive sleep apnea)     currently not using CPAP    Osteoporosis     Peripheral neuropathy     Post laminectomy syndrome     Primary osteoarthritis involving multiple joints 2017    Primary osteoarthritis of both knees 3/3/2017    Proteinuria     Pulmonary hypertension ([address] 2017    Syncope     Tobacco abuse     Valvular heart disease 2017    Dilated RV, thickened MV, mod-severe MR    Vitamin D deficiency        Past Surgical History:   Procedure Laterality Date    APPENDECTOMY      reportedly done at time of last  in 1968     BACK SURGERY  07/2003    x2    C[facility]AC [facility]  [facility]    CARDIOVASCULAR STRESS TEST  , ,     CARPAL TUNNEL RELEASE Bilateral     done at [facility]      x4    COLONOSCOPY  2014, 2008, 2003    [facility] Dr Lori Valencia-hyperplastic polyp, sigmoid diverticula, external hemorrhoid-repeat 5 yrs (2019)    CORONARY ANGIOPLASTY WITH STENT PLACEMENT  2013    [facility]    CORONARY ARTERY BYPASS GRAFT      quadruple bypass @ [facility]  2016    LVH EF 45-50%    LUMBAR SPINE SURGERY      Dr. Scot Robles in [address] [place] [address] ENDOSCOPY  5/20/15, 2013, 13    Dr Diop Knee @ [address]  2017    Dr Tobar-GERD/HIATAL HERNIA/gastritis       Allergies   Allergen Reactions    Iv [Iodides] Anaphylaxis     uncertain    Codeine     Cymbalta [Duloxetine Hcl] Swelling    Gabapentin Swelling    Hydrocodone     Morphine     Pradaxa [Dabigatran Etexilate Mesylate] Other (See Comments)     Bleeding episode    Oxycontin [Oxycodone Hcl] Anxiety    Penicillins Rash       Social History     Socioeconomic History    Marital status:      Spouse name: None    Number of children: 3    Years of education: None    Highest education level: None   Occupational History    Occupation: retired- [employer] children     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 drinks 2 glasses of  diet coke daily, occasional peach ice tea.  Drug use: No     Comment: denies    Sexual activity: Not Currently     Partners: Male   Lifestyle    Physical activity:     Days per week: None     Minutes per session: None    Stress: None   Relationships    Social connections:     Talks on phone: None     Gets together: None     Attends Mandaen service: None     Active member of club or organization: None     Attends meetings of clubs or organizations: None     Relationship status: None    Intimate partner violence:     Fear of current or ex partner: None     Emotionally abused: None     Physically abused: None     Forced sexual activity: None   Other Topics Concern    None   Social History Narrative    Moved from [address] [place] [address] and lives with daughter Lizandro Calles        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 She had smoked for over 50 years, and smoked on average 1 pack every three days    She denies alcohol or illicit drug use     She lives with her daughter             Family History:     Mother  of breast cancer and was a diabetic    One sister with

## 2019-06-28 DIAGNOSIS — M15.9 PRIMARY OSTEOARTHRITIS INVOLVING MULTIPLE JOINTS: Primary | ICD-10-CM

## 2019-06-28 RX ORDER — TRAMADOL HYDROCHLORIDE 50 MG/1
50 TABLET ORAL EVERY 6 HOURS PRN
Qty: 120 TABLET | Refills: 1 | Status: SHIPPED | OUTPATIENT
Start: 2019-06-28 | End: 2019-09-25 | Stop reason: SDUPTHER

## 2019-07-05 ENCOUNTER — HOSPITAL ENCOUNTER (OUTPATIENT)
Dept: MAMMOGRAPHY | Age: 74
Discharge: HOME | End: 2019-07-07
Payer: MEDICARE

## 2019-07-05 DIAGNOSIS — Z78.0 POST-MENOPAUSAL: ICD-10-CM

## 2019-07-05 DIAGNOSIS — M85.80 OSTEOPENIA, UNSPECIFIED LOCATION: ICD-10-CM

## 2019-07-05 PROCEDURE — 77080 DXA BONE DENSITY AXIAL: CPT

## 2019-07-08 PROBLEM — N39.46 MIXED STRESS AND URGE URINARY INCONTINENCE: Status: ACTIVE | Noted: 2019-07-08

## 2019-08-05 ENCOUNTER — OFFICE VISIT (OUTPATIENT)
Dept: CARDIOLOGY CLINIC | Age: 74
End: 2019-08-05
Payer: MEDICARE

## 2019-08-05 VITALS
SYSTOLIC BLOOD PRESSURE: 120 MMHG | RESPIRATION RATE: 16 BRPM | BODY MASS INDEX: 41.63 KG/M2 | HEIGHT: 63 IN | DIASTOLIC BLOOD PRESSURE: 80 MMHG | HEART RATE: 81 BPM

## 2019-08-05 DIAGNOSIS — I34.0 MITRAL VALVE INSUFFICIENCY, UNSPECIFIED ETIOLOGY: ICD-10-CM

## 2019-08-05 DIAGNOSIS — I48.20 CHRONIC ATRIAL FIBRILLATION (HCC): Primary | ICD-10-CM

## 2019-08-05 PROCEDURE — 1090F PRES/ABSN URINE INCON ASSESS: CPT | Performed by: INTERNAL MEDICINE

## 2019-08-05 PROCEDURE — G8427 DOCREV CUR MEDS BY ELIG CLIN: HCPCS | Performed by: INTERNAL MEDICINE

## 2019-08-05 PROCEDURE — G8417 CALC BMI ABV UP PARAM F/U: HCPCS | Performed by: INTERNAL MEDICINE

## 2019-08-05 PROCEDURE — 4040F PNEUMOC VAC/ADMIN/RCVD: CPT | Performed by: INTERNAL MEDICINE

## 2019-08-05 PROCEDURE — 93000 ELECTROCARDIOGRAM COMPLETE: CPT | Performed by: INTERNAL MEDICINE

## 2019-08-05 PROCEDURE — G8598 ASA/ANTIPLAT THER USED: HCPCS | Performed by: INTERNAL MEDICINE

## 2019-08-05 PROCEDURE — 99214 OFFICE O/P EST MOD 30 MIN: CPT | Performed by: INTERNAL MEDICINE

## 2019-08-05 PROCEDURE — 1036F TOBACCO NON-USER: CPT | Performed by: INTERNAL MEDICINE

## 2019-08-05 PROCEDURE — G8399 PT W/DXA RESULTS DOCUMENT: HCPCS | Performed by: INTERNAL MEDICINE

## 2019-08-05 PROCEDURE — 1123F ACP DISCUSS/DSCN MKR DOCD: CPT | Performed by: INTERNAL MEDICINE

## 2019-08-05 PROCEDURE — 3017F COLORECTAL CA SCREEN DOC REV: CPT | Performed by: INTERNAL MEDICINE

## 2019-08-05 NOTE — PROGRESS NOTES
metanephrines     Esophagitis, Harvey grade A 12/2013    Fecal incontinence 5/9/2017    Dr Farnaz Cardoso Fibromyalgia     Gastritis     Gastroesophageal reflux disease without esophagitis 1/16/2017    Generalized OA     GI bleed     Hiatal hernia     4 cm-Dr Farnaz Cardoso History of methicillin resistant Staphylococcus aureus infection 12/1/2011    Hyperlipidemia     Hyperplastic colon polyp 2008    Hypertension     Hypokalemia     Hypothyroidism     Iron deficiency anemia     Left atrial dilation 2/14/2017    Lumbar herniated disc     LVH (left ventricular hypertrophy) 1/16/2017    Microalbuminuria 4/22/2012    STACEY (obstructive sleep apnea)     currently not using CPAP    Osteoporosis     Peripheral neuropathy     Post laminectomy syndrome     Primary osteoarthritis involving multiple joints 5/11/2017    Primary osteoarthritis of both knees 3/3/2017    Proteinuria     Pulmonary hypertension ([address] 1/16/2017    Syncope     Tobacco abuse     Valvular heart disease 1/16/2017    Dilated RV, thickened MV, mod-severe MR    Vitamin D deficiency        Patient Active Problem List   Diagnosis    Acute on chronic systolic congestive heart failure (HCC)    Chronic atrial fibrillation (HCC)    HTN (hypertension)    Asthma    Hyperlipidemia    CAD (coronary artery disease)    Chronic kidney disease, stage III (moderate) (HCC)    Hypothyroidism    Anxiety    Gastroesophageal reflux disease    COPD (chronic obstructive pulmonary disease) ([address]    Diverticular disease    Vitamin D deficiency    Osteoporosis    STACEY (obstructive sleep apnea)    Peripheral neuropathy    Adenoma of left adrenal gland    Generalized OA    LVH (left ventricular hypertrophy)    Pulmonary hypertension (HCC)    Valvular heart disease    Congestive heart failure, NYHA class 3 and ACC/AHA stage C (HCC)    Left atrial dilation    Elevated plasma metanephrines    Albuminuria    Primary osteoarthritis of both knees  Dry eye    Hiatal hernia    Fecal incontinence    Primary osteoarthritis involving multiple joints    CHF, acute on chronic ([facility])    History of asthma    Dizziness    Chronic kidney disease    Type 2 diabetes mellitus with diabetic nephropathy ([facility])    CHF (congestive heart failure), NYHA class I, acute on chronic, combined ([facility])    Dyspnea on exertion    Morbid obesity ([facility])    Mixed stress and urge urinary incontinence       Allergies   Allergen Reactions    Iv [Iodides] Anaphylaxis     uncertain    Codeine     Cymbalta [Duloxetine Hcl] Swelling    Gabapentin Swelling    Hydrocodone     Morphine     Pradaxa [Dabigatran Etexilate Mesylate] Other (See Comments)     Bleeding episode    Oxycontin [Oxycodone Hcl] Anxiety    Penicillins Rash       Current Outpatient Medications   Medication Sig Dispense Refill    bumetanide (BUMEX) 2 MG tablet Take 1 tablet by mouth 2 times daily 60 tablet 3    meclizine (ANTIVERT) 12.5 MG tablet TAKE 1 TABLET BY MOUTH 3 TIMES A DAY AS NEEDED. FOR DIZZINESS 60 tablet 2    methylPREDNISolone (MEDROL, KACY,) 4 MG tablet Take by mouth. 1 kit 0    acetaminophen (APAP EXTRA STRENGTH) 500 MG tablet Take 2 tablets by mouth every 6 hours as needed for Pain 20 tablet 0    Alcohol Swabs (ALCOHOL PREP) PADS 1 applicator by Does not apply route three times daily 100 each 1    potassium chloride (KLOR-CON) 20 MEQ packet Take 1 tablet by mouth twice daily 60 each 5    blood glucose test strips (ASCENSIA AUTODISC VI;ONE TOUCH ULTRA TEST VI) strip 1 each by In Vitro route daily As needed.  100 each 3    TRUEPLUS LANCETS 33G MISC Test blood sugars three times daily E11.9 100 each 5    albuterol sulfate  (90 Base) MCG/ACT inhaler Inhale 2 puffs into the lungs every 6 hours as needed for Wheezing 3 Inhaler 4    levothyroxine (LEVOTHROID) 75 MCG tablet Take 1 tablet by mouth daily 30 tablet 5    atorvastatin (LIPITOR) 40 MG tablet Take 1 tablet by mouth daily 30 dry. No rash noted. Not diaphoretic. No erythema. Psychiatric: Normal mood and affect. Behavior is normal.     EKG:  Afib, nonspecific ST and T waves changes.     Echo Summary 2/3/17:  Latrice Moore left atrium is severely dilated.   Atrial fibrillation   Elevated L atrial pressure   Ejection fraction biplane = 47%.   The left atrium is severely dilated.   Atrial fibrillation   Elevated L atrial pressure   Moderate eccentric mitral regurgitation .   Pulmonary hypertension is moderate .   No pericardial effusion. Echo Summary 1/24/18:   Ejection fraction biplane =53%.   The left atrium is severely dilated.   Atrial fibrillation   Elevated L atrial pressure   Severe posteriorly directed eccentric mitral regurgitation.   Probable MV posterior leaflet restriction   The aortic valve appears mildly sclerotic.   No pericardial effusion.     ASSESSMENT AND PLAN:  Patient Active Problem List   Diagnosis    Acute on chronic systolic congestive heart failure (HCC)    Chronic atrial fibrillation (HCC)    HTN (hypertension)    Asthma    Hyperlipidemia    CAD (coronary artery disease)    Chronic kidney disease, stage III (moderate) (HCC)    Hypothyroidism    Anxiety    Gastroesophageal reflux disease    COPD (chronic obstructive pulmonary disease) ([address]    Diverticular disease    Vitamin D deficiency    Osteoporosis    STACEY (obstructive sleep apnea)    Peripheral neuropathy    Adenoma of left adrenal gland    Generalized OA    LVH (left ventricular hypertrophy)    Pulmonary hypertension (HCC)    Valvular heart disease    Congestive heart failure, NYHA class 3 and ACC/AHA stage C (HCC)    Left atrial dilation    Elevated plasma metanephrines    Albuminuria    Primary osteoarthritis of both knees    Dry eye    Hiatal hernia    Fecal incontinence    Primary osteoarthritis involving multiple joints    CHF, acute on chronic ([address]    History of asthma    Dizziness    Chronic kidney disease    Type 2 diabetes mellitus with diabetic nephropathy [facility]    CHF (congestive heart failure), NYHA class I, acute on chronic, combined (HCC)    Dyspnea on exertion    Morbid obesity (HCC)    Mixed stress and urge urinary incontinence     1. CAD-CABG:   Stable symptoms in regards to angina. BB/ASA/statin/nitrate/Ranexa. 2. CHF:   Stable symptoms and edema. Echo as above. Coreg/ACEI/hydralazine/nitrates/bumex. 3. Chronic Afib: Eliquis and rate controlled. 4. VHD:   Echo suggest severe eccentric MR with low normal LVEF and normal LV size. Observe for now. Patient is frail and deconditioned and a MV intervention would be a redo. Eventual consideration for Mitraclip? Echo. 5. Renal failure: Sees Dr. Arleth Chowdhury. 6. HTN: Observe. Nubia Ang D.O.   Cardiologist  Cardiology, [facility]

## 2019-08-14 ENCOUNTER — HOSPITAL ENCOUNTER (OUTPATIENT)
Dept: CARDIOLOGY | Age: 74
Discharge: HOME | End: 2019-08-14
Payer: MEDICARE

## 2019-08-14 DIAGNOSIS — I34.0 MITRAL VALVE INSUFFICIENCY, UNSPECIFIED ETIOLOGY: ICD-10-CM

## 2019-08-14 DIAGNOSIS — I48.20 CHRONIC ATRIAL FIBRILLATION (HCC): ICD-10-CM

## 2019-08-14 LAB
LV EF: 55 %
LVEF MODALITY: NORMAL

## 2019-08-14 PROCEDURE — 93306 TTE W/DOPPLER COMPLETE: CPT | Performed by: PSYCHIATRY & NEUROLOGY

## 2019-08-22 RX ORDER — HYDRALAZINE HYDROCHLORIDE 50 MG/1
50 TABLET, FILM COATED ORAL 3 TIMES DAILY
Qty: 90 TABLET | Refills: 5 | Status: SHIPPED | OUTPATIENT
Start: 2019-08-22 | End: 2019-10-31 | Stop reason: SDUPTHER

## 2019-08-22 RX ORDER — FLUTICASONE PROPIONATE 50 MCG
2 SPRAY, SUSPENSION (ML) NASAL EVERY MORNING
Qty: 1 BOTTLE | Refills: 5 | Status: SHIPPED
Start: 2019-08-22 | End: 2020-04-07 | Stop reason: SDUPTHER

## 2019-08-22 RX ORDER — LEVOTHYROXINE SODIUM 0.07 MG/1
75 TABLET ORAL DAILY
Qty: 30 TABLET | Refills: 5 | Status: SHIPPED | OUTPATIENT
Start: 2019-08-22 | End: 2019-09-11 | Stop reason: SDUPTHER

## 2019-08-22 RX ORDER — CARVEDILOL 25 MG/1
25 TABLET ORAL 2 TIMES DAILY WITH MEALS
Qty: 60 TABLET | Refills: 5 | Status: SHIPPED
Start: 2019-08-22 | End: 2020-03-03 | Stop reason: SDUPTHER

## 2019-08-22 RX ORDER — LISINOPRIL 20 MG/1
20 TABLET ORAL 2 TIMES DAILY
Qty: 60 TABLET | Refills: 5 | Status: ON HOLD
Start: 2019-08-22 | End: 2020-02-26 | Stop reason: SDUPTHER

## 2019-08-22 RX ORDER — ATORVASTATIN CALCIUM 40 MG/1
40 TABLET, FILM COATED ORAL DAILY
Qty: 30 TABLET | Refills: 5 | Status: SHIPPED | OUTPATIENT
Start: 2019-08-22 | End: 2020-01-13 | Stop reason: SDUPTHER

## 2019-08-22 RX ORDER — LOPERAMIDE HYDROCHLORIDE 2 MG/1
2 CAPSULE ORAL 4 TIMES DAILY PRN
Qty: 120 CAPSULE | Refills: 5 | Status: SHIPPED | OUTPATIENT
Start: 2019-08-22

## 2019-08-23 ENCOUNTER — HOSPITAL ENCOUNTER (OUTPATIENT)
Age: 74
Discharge: HOME | End: 2019-08-25
Payer: MEDICARE

## 2019-08-23 ENCOUNTER — OFFICE VISIT (OUTPATIENT)
Dept: PRIMARY CARE CLINIC | Age: 74
End: 2019-08-23
Payer: MEDICARE

## 2019-08-23 VITALS
OXYGEN SATURATION: 98 % | SYSTOLIC BLOOD PRESSURE: 116 MMHG | TEMPERATURE: 98.2 F | BODY MASS INDEX: 41.63 KG/M2 | DIASTOLIC BLOOD PRESSURE: 82 MMHG | RESPIRATION RATE: 18 BRPM | HEIGHT: 63 IN | HEART RATE: 96 BPM

## 2019-08-23 DIAGNOSIS — E66.01 MORBID OBESITY WITH BMI OF 40.0-44.9, ADULT (HCC): ICD-10-CM

## 2019-08-23 DIAGNOSIS — E78.49 OTHER HYPERLIPIDEMIA: ICD-10-CM

## 2019-08-23 DIAGNOSIS — R42 DIZZINESS: ICD-10-CM

## 2019-08-23 DIAGNOSIS — I50.9 CONGESTIVE HEART FAILURE, NYHA CLASS 3 AND ACC/AHA STAGE C (HCC): ICD-10-CM

## 2019-08-23 DIAGNOSIS — E55.9 VITAMIN D DEFICIENCY: ICD-10-CM

## 2019-08-23 DIAGNOSIS — E03.9 HYPOTHYROIDISM, UNSPECIFIED TYPE: ICD-10-CM

## 2019-08-23 DIAGNOSIS — N18.9 CHRONIC KIDNEY DISEASE, UNSPECIFIED CKD STAGE: ICD-10-CM

## 2019-08-23 DIAGNOSIS — E11.21 TYPE 2 DIABETES MELLITUS WITH DIABETIC NEPHROPATHY, WITH LONG-TERM CURRENT USE OF INSULIN (HCC): ICD-10-CM

## 2019-08-23 DIAGNOSIS — Z79.4 TYPE 2 DIABETES MELLITUS WITH DIABETIC NEPHROPATHY, WITH LONG-TERM CURRENT USE OF INSULIN (HCC): ICD-10-CM

## 2019-08-23 DIAGNOSIS — E66.01 MORBID OBESITY WITH BMI OF 40.0-44.9, ADULT (HCC): Primary | ICD-10-CM

## 2019-08-23 LAB
ALBUMIN SERPL-MCNC: 3.8 G/DL (ref 3.5–5.2)
ALP BLD-CCNC: 110 U/L (ref 35–104)
ALT SERPL-CCNC: 16 U/L (ref 0–32)
ANION GAP SERPL CALCULATED.3IONS-SCNC: 14 MMOL/L (ref 7–16)
AST SERPL-CCNC: 16 U/L (ref 0–31)
BASOPHILS ABSOLUTE: 0.03 E9/L (ref 0–0.2)
BASOPHILS RELATIVE PERCENT: 0.4 % (ref 0–2)
BILIRUB SERPL-MCNC: 0.5 MG/DL (ref 0–1.2)
BUN BLDV-MCNC: 41 MG/DL (ref 8–23)
CALCIUM SERPL-MCNC: 9.6 MG/DL (ref 8.6–10.2)
CHLORIDE BLD-SCNC: 103 MMOL/L (ref 98–107)
CHOLESTEROL, TOTAL: 141 MG/DL (ref 0–199)
CO2: 27 MMOL/L (ref 22–29)
CREAT SERPL-MCNC: 1.7 MG/DL (ref 0.5–1)
EOSINOPHILS ABSOLUTE: 0.04 E9/L (ref 0.05–0.5)
EOSINOPHILS RELATIVE PERCENT: 0.5 % (ref 0–6)
GFR AFRICAN AMERICAN: 35
GFR NON-AFRICAN AMERICAN: 35 ML/MIN/1.73
GLUCOSE BLD-MCNC: 88 MG/DL (ref 74–99)
HBA1C MFR BLD: 7.4 % (ref 4–5.6)
HCT VFR BLD CALC: 38.1 % (ref 34–48)
HDLC SERPL-MCNC: 47 MG/DL
HEMOGLOBIN: 11.6 G/DL (ref 11.5–15.5)
IMMATURE GRANULOCYTES #: 0.03 E9/L
IMMATURE GRANULOCYTES %: 0.4 % (ref 0–5)
LDL CHOLESTEROL CALCULATED: 82 MG/DL (ref 0–99)
LYMPHOCYTES ABSOLUTE: 1 E9/L (ref 1.5–4)
LYMPHOCYTES RELATIVE PERCENT: 13.3 % (ref 20–42)
MCH RBC QN AUTO: 27.8 PG (ref 26–35)
MCHC RBC AUTO-ENTMCNC: 30.4 % (ref 32–34.5)
MCV RBC AUTO: 91.1 FL (ref 80–99.9)
MONOCYTES ABSOLUTE: 0.49 E9/L (ref 0.1–0.95)
MONOCYTES RELATIVE PERCENT: 6.5 % (ref 2–12)
NEUTROPHILS ABSOLUTE: 5.92 E9/L (ref 1.8–7.3)
NEUTROPHILS RELATIVE PERCENT: 78.9 % (ref 43–80)
PDW BLD-RTO: 15.7 FL (ref 11.5–15)
PLATELET # BLD: 198 E9/L (ref 130–450)
PMV BLD AUTO: 11.6 FL (ref 7–12)
POTASSIUM SERPL-SCNC: 4.5 MMOL/L (ref 3.5–5)
RBC # BLD: 4.18 E12/L (ref 3.5–5.5)
SODIUM BLD-SCNC: 144 MMOL/L (ref 132–146)
TOTAL PROTEIN: 7 G/DL (ref 6.4–8.3)
TRIGL SERPL-MCNC: 58 MG/DL (ref 0–149)
TSH SERPL DL<=0.05 MIU/L-ACNC: 1.73 UIU/ML (ref 0.27–4.2)
VITAMIN D 25-HYDROXY: 48 NG/ML (ref 30–100)
VLDLC SERPL CALC-MCNC: 12 MG/DL
WBC # BLD: 7.5 E9/L (ref 4.5–11.5)

## 2019-08-23 PROCEDURE — G8427 DOCREV CUR MEDS BY ELIG CLIN: HCPCS | Performed by: FAMILY MEDICINE

## 2019-08-23 PROCEDURE — 84443 ASSAY THYROID STIM HORMONE: CPT

## 2019-08-23 PROCEDURE — 1123F ACP DISCUSS/DSCN MKR DOCD: CPT | Performed by: FAMILY MEDICINE

## 2019-08-23 PROCEDURE — G8598 ASA/ANTIPLAT THER USED: HCPCS | Performed by: FAMILY MEDICINE

## 2019-08-23 PROCEDURE — 36415 COLL VENOUS BLD VENIPUNCTURE: CPT

## 2019-08-23 PROCEDURE — 3017F COLORECTAL CA SCREEN DOC REV: CPT | Performed by: FAMILY MEDICINE

## 2019-08-23 PROCEDURE — 82306 VITAMIN D 25 HYDROXY: CPT

## 2019-08-23 PROCEDURE — 80061 LIPID PANEL: CPT

## 2019-08-23 PROCEDURE — G8399 PT W/DXA RESULTS DOCUMENT: HCPCS | Performed by: FAMILY MEDICINE

## 2019-08-23 PROCEDURE — G8417 CALC BMI ABV UP PARAM F/U: HCPCS | Performed by: FAMILY MEDICINE

## 2019-08-23 PROCEDURE — 80053 COMPREHEN METABOLIC PANEL: CPT

## 2019-08-23 PROCEDURE — 1036F TOBACCO NON-USER: CPT | Performed by: FAMILY MEDICINE

## 2019-08-23 PROCEDURE — 99214 OFFICE O/P EST MOD 30 MIN: CPT | Performed by: FAMILY MEDICINE

## 2019-08-23 PROCEDURE — 85025 COMPLETE CBC W/AUTO DIFF WBC: CPT

## 2019-08-23 PROCEDURE — 83036 HEMOGLOBIN GLYCOSYLATED A1C: CPT

## 2019-08-23 PROCEDURE — 3046F HEMOGLOBIN A1C LEVEL >9.0%: CPT | Performed by: FAMILY MEDICINE

## 2019-08-23 PROCEDURE — 2022F DILAT RTA XM EVC RTNOPTHY: CPT | Performed by: FAMILY MEDICINE

## 2019-08-23 PROCEDURE — 1090F PRES/ABSN URINE INCON ASSESS: CPT | Performed by: FAMILY MEDICINE

## 2019-08-23 PROCEDURE — 4040F PNEUMOC VAC/ADMIN/RCVD: CPT | Performed by: FAMILY MEDICINE

## 2019-08-23 RX ORDER — MECLIZINE HCL 12.5 MG/1
12.5 TABLET ORAL 3 TIMES DAILY PRN
Qty: 15 TABLET | Refills: 0 | Status: SHIPPED | OUTPATIENT
Start: 2019-08-23 | End: 2019-08-23 | Stop reason: SDUPTHER

## 2019-08-23 RX ORDER — MECLIZINE HCL 12.5 MG/1
12.5 TABLET ORAL 3 TIMES DAILY PRN
Qty: 15 TABLET | Refills: 0 | Status: SHIPPED | OUTPATIENT
Start: 2019-08-23 | End: 2019-09-02

## 2019-08-23 ASSESSMENT — ENCOUNTER SYMPTOMS
FACIAL SWELLING: 0
ABDOMINAL PAIN: 0
SINUS PRESSURE: 0
WHEEZING: 0
DIARRHEA: 0
SHORTNESS OF BREATH: 0
APNEA: 0
PHOTOPHOBIA: 0
NAUSEA: 0
ALLERGIC/IMMUNOLOGIC NEGATIVE: 1
SORE THROAT: 0
COLOR CHANGE: 0
BLOOD IN STOOL: 0
VOMITING: 0
BACK PAIN: 0
CHEST TIGHTNESS: 0
COUGH: 0

## 2019-09-11 ENCOUNTER — OFFICE VISIT (OUTPATIENT)
Dept: ENDOCRINOLOGY | Age: 74
End: 2019-09-11
Payer: MEDICARE

## 2019-09-11 VITALS
RESPIRATION RATE: 16 BRPM | DIASTOLIC BLOOD PRESSURE: 84 MMHG | WEIGHT: 248 LBS | HEIGHT: 64 IN | OXYGEN SATURATION: 97 % | BODY MASS INDEX: 42.34 KG/M2 | SYSTOLIC BLOOD PRESSURE: 138 MMHG | HEART RATE: 96 BPM

## 2019-09-11 DIAGNOSIS — Z79.4 TYPE 2 DIABETES MELLITUS WITH DIABETIC NEPHROPATHY, WITH LONG-TERM CURRENT USE OF INSULIN (HCC): Primary | ICD-10-CM

## 2019-09-11 DIAGNOSIS — E03.9 HYPOTHYROIDISM, UNSPECIFIED TYPE: ICD-10-CM

## 2019-09-11 DIAGNOSIS — E55.9 VITAMIN D DEFICIENCY: ICD-10-CM

## 2019-09-11 DIAGNOSIS — E11.21 TYPE 2 DIABETES MELLITUS WITH DIABETIC NEPHROPATHY, WITH LONG-TERM CURRENT USE OF INSULIN (HCC): Primary | ICD-10-CM

## 2019-09-11 PROCEDURE — G8417 CALC BMI ABV UP PARAM F/U: HCPCS | Performed by: INTERNAL MEDICINE

## 2019-09-11 PROCEDURE — 1036F TOBACCO NON-USER: CPT | Performed by: INTERNAL MEDICINE

## 2019-09-11 PROCEDURE — G8427 DOCREV CUR MEDS BY ELIG CLIN: HCPCS | Performed by: INTERNAL MEDICINE

## 2019-09-11 PROCEDURE — 3045F PR MOST RECENT HEMOGLOBIN A1C LEVEL 7.0-9.0%: CPT | Performed by: INTERNAL MEDICINE

## 2019-09-11 PROCEDURE — 1123F ACP DISCUSS/DSCN MKR DOCD: CPT | Performed by: INTERNAL MEDICINE

## 2019-09-11 PROCEDURE — 1090F PRES/ABSN URINE INCON ASSESS: CPT | Performed by: INTERNAL MEDICINE

## 2019-09-11 PROCEDURE — 2022F DILAT RTA XM EVC RTNOPTHY: CPT | Performed by: INTERNAL MEDICINE

## 2019-09-11 PROCEDURE — 3017F COLORECTAL CA SCREEN DOC REV: CPT | Performed by: INTERNAL MEDICINE

## 2019-09-11 PROCEDURE — G8598 ASA/ANTIPLAT THER USED: HCPCS | Performed by: INTERNAL MEDICINE

## 2019-09-11 PROCEDURE — 99205 OFFICE O/P NEW HI 60 MIN: CPT | Performed by: INTERNAL MEDICINE

## 2019-09-11 PROCEDURE — 4040F PNEUMOC VAC/ADMIN/RCVD: CPT | Performed by: INTERNAL MEDICINE

## 2019-09-11 PROCEDURE — G8399 PT W/DXA RESULTS DOCUMENT: HCPCS | Performed by: INTERNAL MEDICINE

## 2019-09-11 RX ORDER — LEVOTHYROXINE SODIUM 0.07 MG/1
75 TABLET ORAL DAILY
Qty: 90 TABLET | Refills: 5 | Status: SHIPPED | OUTPATIENT
Start: 2019-09-11 | End: 2020-01-13 | Stop reason: SDUPTHER

## 2019-09-11 RX ORDER — PEN NEEDLE, DIABETIC 31 GX5/16"
1 NEEDLE, DISPOSABLE MISCELLANEOUS 3 TIMES DAILY
Qty: 100 EACH | Refills: 1 | Status: ON HOLD
Start: 2019-09-11 | End: 2020-02-28 | Stop reason: HOSPADM

## 2019-09-11 RX ORDER — LANCETS 30 GAUGE
EACH MISCELLANEOUS
Qty: 300 EACH | Refills: 5 | Status: ON HOLD
Start: 2019-09-11 | End: 2020-02-28 | Stop reason: HOSPADM

## 2019-09-11 NOTE — PROGRESS NOTES
 Primary osteoarthritis involving multiple joints 2017    Primary osteoarthritis of both knees 3/3/2017    Proteinuria     Pulmonary hypertension ([address] 2017    Syncope     Tobacco abuse     Valvular heart disease 2017    Dilated RV, thickened MV, mod-severe MR    Vitamin D deficiency      PAST SURGICAL HISTORY   Past Surgical History:   Procedure Laterality Date    APPENDECTOMY      reportedly done at time of last  in      BACK SURGERY  07/2003    x2    CARDIAC CATHETERIZATION  2012    [address] TEST  , ,    [place] [place] Bilateral     done at [facility]      x4    COLONOSCOPY  2014, 2008, 2003    [facility] Dr Bobbi Valencia-hyperplastic polyp, sigmoid diverticula, external hemorrhoid-repeat 5 yrs (2019)    CORONARY ANGIOPLASTY WITH STENT PLACEMENT  2013    [facility]    CORONARY ARTERY BYPASS GRAFT      quadruple bypass @ [facility]    DOPPLER ECHOCARDIOGRAPHY  2016    LVH EF 45-50%    [address]      Dr. 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 [address] resource strain: Not on file    Food insecurity:     Worry: Not on file     Inability: Not on file   [employer] needs:     Medical: Not on file     Non-medical: Not on file   Tobacco Use    Smoking status: Former Smoker     Packs/day: 0.30     Years: 35.00     Pack years: 10.50 248 lb (112.5 kg)   SpO2 97%   BMI 43.24 kg/m²   BP Readings from Last 4 Encounters:   09/11/19 138/84   08/23/19 116/82   08/05/19 120/80   06/27/19 (!) 140/82     Wt Readings from Last 6 Encounters:   09/11/19 248 lb (112.5 kg)   06/27/19 235 lb (106.6 kg)   05/31/19 236 lb (107 kg)   05/22/19 235 lb (106.6 kg)   05/02/19 237 lb 9.6 oz (107.8 kg)   04/29/19 238 lb (108 kg)       Physical examination:  General: awake alert, oriented x3, no abnormal position or movements. HEENT: normocephalic non-traumatic, no exophthalmos   Neck: supple, no LN enlargement, no thyromegaly, no thyroid tenderness, no JVD. Pulm: Clear equal air entry no added sounds, no wheezing or rhonchi    CVS: S1 + S2, no murmur, no heave. Dorsalis pedis pulse palpable   Abd: soft lax, no tenderness, no organomegaly, audible bowel sounds. Skin: warm, no lesions, no rash.  No callus, no Ulcers, No acanthosis nigricans  Musculoskeletal: No back tenderness, no kyphosis/scoliosis    Neuro: CN intact, Monofilament sensation decreased bilateral , muscle power normal  Psych: normal mood, and affect      Review of Laboratory Data:  I personally reviewed the following lab:  Lab Results   Component Value Date/Time    WBC 7.5 08/23/2019 10:31 AM    RBC 4.18 08/23/2019 10:31 AM    HGB 11.6 08/23/2019 10:31 AM    HCT 38.1 08/23/2019 10:31 AM    MCV 91.1 08/23/2019 10:31 AM    MCH 27.8 08/23/2019 10:31 AM    MCHC 30.4 (L) 08/23/2019 10:31 AM    RDW 15.7 (H) 08/23/2019 10:31 AM     08/23/2019 10:31 AM    MPV 11.6 08/23/2019 10:31 AM      Lab Results   Component Value Date/Time     08/23/2019 10:31 AM    K 4.5 08/23/2019 10:31 AM    K 5.1 (H) 02/07/2018 05:45 AM    CO2 27 08/23/2019 10:31 AM    BUN 41 (H) 08/23/2019 10:31 AM    CREATININE 1.7 (H) 08/23/2019 10:31 AM    CALCIUM 9.6 08/23/2019 10:31 AM    LABGLOM 35 08/23/2019 10:31 AM    GFRAA 35 08/23/2019 10:31 AM      Lab Results   Component Value Date/Time    TSH 1.730 08/23/2019 10:31 AM

## 2019-09-13 DIAGNOSIS — Z79.4 TYPE 2 DIABETES MELLITUS WITH DIABETIC NEPHROPATHY, WITH LONG-TERM CURRENT USE OF INSULIN (HCC): Primary | ICD-10-CM

## 2019-09-13 DIAGNOSIS — E11.21 TYPE 2 DIABETES MELLITUS WITH DIABETIC NEPHROPATHY, WITH LONG-TERM CURRENT USE OF INSULIN (HCC): Primary | ICD-10-CM

## 2019-09-18 ENCOUNTER — APPOINTMENT (OUTPATIENT)
Dept: GENERAL RADIOLOGY | Age: 74
End: 2019-09-18
Payer: MEDICARE

## 2019-09-18 ENCOUNTER — HOSPITAL ENCOUNTER (OUTPATIENT)
Age: 74
Setting detail: OBSERVATION
Discharge: HOME | End: 2019-09-19
Attending: EMERGENCY MEDICINE | Admitting: INTERNAL MEDICINE
Payer: MEDICARE

## 2019-09-18 DIAGNOSIS — R07.9 CHEST PAIN, UNSPECIFIED TYPE: Primary | ICD-10-CM

## 2019-09-18 LAB
ALBUMIN SERPL-MCNC: 3.4 G/DL (ref 3.5–5.2)
ALP BLD-CCNC: 89 U/L (ref 35–104)
ALT SERPL-CCNC: 11 U/L (ref 0–32)
ANION GAP SERPL CALCULATED.3IONS-SCNC: 10 MMOL/L (ref 7–16)
AST SERPL-CCNC: 19 U/L (ref 0–31)
BASOPHILS ABSOLUTE: 0.03 E9/L (ref 0–0.2)
BASOPHILS RELATIVE PERCENT: 0.4 % (ref 0–2)
BILIRUB SERPL-MCNC: 0.4 MG/DL (ref 0–1.2)
BUN BLDV-MCNC: 39 MG/DL (ref 8–23)
CALCIUM SERPL-MCNC: 9.4 MG/DL (ref 8.6–10.2)
CHLORIDE BLD-SCNC: 102 MMOL/L (ref 98–107)
CO2: 26 MMOL/L (ref 22–29)
CREAT SERPL-MCNC: 2.1 MG/DL (ref 0.5–1)
EOSINOPHILS ABSOLUTE: 0.06 E9/L (ref 0.05–0.5)
EOSINOPHILS RELATIVE PERCENT: 0.9 % (ref 0–6)
GFR AFRICAN AMERICAN: 28
GFR NON-AFRICAN AMERICAN: 28 ML/MIN/1.73
GLUCOSE BLD-MCNC: 186 MG/DL (ref 74–99)
HCT VFR BLD CALC: 36 % (ref 34–48)
HEMOGLOBIN: 11.1 G/DL (ref 11.5–15.5)
IMMATURE GRANULOCYTES #: 0.02 E9/L
IMMATURE GRANULOCYTES %: 0.3 % (ref 0–5)
LYMPHOCYTES ABSOLUTE: 1.45 E9/L (ref 1.5–4)
LYMPHOCYTES RELATIVE PERCENT: 21 % (ref 20–42)
MCH RBC QN AUTO: 27.5 PG (ref 26–35)
MCHC RBC AUTO-ENTMCNC: 30.8 % (ref 32–34.5)
MCV RBC AUTO: 89.3 FL (ref 80–99.9)
METER GLUCOSE: 256 MG/DL (ref 74–99)
MONOCYTES ABSOLUTE: 0.63 E9/L (ref 0.1–0.95)
MONOCYTES RELATIVE PERCENT: 9.1 % (ref 2–12)
NEUTROPHILS ABSOLUTE: 4.7 E9/L (ref 1.8–7.3)
NEUTROPHILS RELATIVE PERCENT: 68.3 % (ref 43–80)
PDW BLD-RTO: 15.4 FL (ref 11.5–15)
PLATELET # BLD: 177 E9/L (ref 130–450)
PMV BLD AUTO: 11.1 FL (ref 7–12)
POTASSIUM SERPL-SCNC: 5 MMOL/L (ref 3.5–5)
PRO-BNP: 2366 PG/ML (ref 0–450)
RBC # BLD: 4.03 E12/L (ref 3.5–5.5)
REASON FOR REJECTION: NORMAL
REJECTED TEST: NORMAL
SODIUM BLD-SCNC: 138 MMOL/L (ref 132–146)
TOTAL PROTEIN: 6.8 G/DL (ref 6.4–8.3)
TROPONIN: 0.01 NG/ML (ref 0–0.03)
TROPONIN: 0.02 NG/ML (ref 0–0.03)
WBC # BLD: 6.9 E9/L (ref 4.5–11.5)

## 2019-09-18 PROCEDURE — 93005 ELECTROCARDIOGRAM TRACING: CPT | Performed by: EMERGENCY MEDICINE

## 2019-09-18 PROCEDURE — 6370000000 HC RX 637 (ALT 250 FOR IP): Performed by: INTERNAL MEDICINE

## 2019-09-18 PROCEDURE — 99285 EMERGENCY DEPT VISIT HI MDM: CPT

## 2019-09-18 PROCEDURE — 83880 ASSAY OF NATRIURETIC PEPTIDE: CPT

## 2019-09-18 PROCEDURE — 80053 COMPREHEN METABOLIC PANEL: CPT

## 2019-09-18 PROCEDURE — 94640 AIRWAY INHALATION TREATMENT: CPT

## 2019-09-18 PROCEDURE — G0378 HOSPITAL OBSERVATION PER HR: HCPCS

## 2019-09-18 PROCEDURE — 2580000003 HC RX 258: Performed by: INTERNAL MEDICINE

## 2019-09-18 PROCEDURE — 85025 COMPLETE CBC W/AUTO DIFF WBC: CPT

## 2019-09-18 PROCEDURE — 36415 COLL VENOUS BLD VENIPUNCTURE: CPT

## 2019-09-18 PROCEDURE — 71045 X-RAY EXAM CHEST 1 VIEW: CPT

## 2019-09-18 PROCEDURE — 2700000000 HC OXYGEN THERAPY PER DAY

## 2019-09-18 PROCEDURE — 82962 GLUCOSE BLOOD TEST: CPT

## 2019-09-18 PROCEDURE — 84484 ASSAY OF TROPONIN QUANT: CPT

## 2019-09-18 RX ORDER — SODIUM CHLORIDE 0.9 % (FLUSH) 0.9 %
10 SYRINGE (ML) INJECTION PRN
Status: DISCONTINUED | OUTPATIENT
Start: 2019-09-18 | End: 2019-09-19 | Stop reason: HOSPADM

## 2019-09-18 RX ORDER — FAMOTIDINE 20 MG/1
20 TABLET, FILM COATED ORAL DAILY
Status: DISCONTINUED | OUTPATIENT
Start: 2019-09-18 | End: 2019-09-19 | Stop reason: HOSPADM

## 2019-09-18 RX ORDER — ASPIRIN 81 MG/1
81 TABLET, CHEWABLE ORAL EVERY MORNING
Status: DISCONTINUED | OUTPATIENT
Start: 2019-09-19 | End: 2019-09-19 | Stop reason: HOSPADM

## 2019-09-18 RX ORDER — INSULIN GLARGINE 100 [IU]/ML
55 INJECTION, SOLUTION SUBCUTANEOUS NIGHTLY
Status: DISCONTINUED | OUTPATIENT
Start: 2019-09-18 | End: 2019-09-19 | Stop reason: HOSPADM

## 2019-09-18 RX ORDER — LISINOPRIL 20 MG/1
20 TABLET ORAL 2 TIMES DAILY
Status: DISCONTINUED | OUTPATIENT
Start: 2019-09-18 | End: 2019-09-19 | Stop reason: HOSPADM

## 2019-09-18 RX ORDER — HYDRALAZINE HYDROCHLORIDE 50 MG/1
50 TABLET, FILM COATED ORAL 3 TIMES DAILY
Status: DISCONTINUED | OUTPATIENT
Start: 2019-09-18 | End: 2019-09-19 | Stop reason: HOSPADM

## 2019-09-18 RX ORDER — ALBUTEROL SULFATE 90 UG/1
2 AEROSOL, METERED RESPIRATORY (INHALATION) EVERY 6 HOURS PRN
Status: DISCONTINUED | OUTPATIENT
Start: 2019-09-18 | End: 2019-09-19 | Stop reason: HOSPADM

## 2019-09-18 RX ORDER — CARVEDILOL 25 MG/1
25 TABLET ORAL 2 TIMES DAILY WITH MEALS
Status: DISCONTINUED | OUTPATIENT
Start: 2019-09-18 | End: 2019-09-19 | Stop reason: HOSPADM

## 2019-09-18 RX ORDER — IPRATROPIUM BROMIDE AND ALBUTEROL SULFATE 2.5; .5 MG/3ML; MG/3ML
1 SOLUTION RESPIRATORY (INHALATION)
Status: DISCONTINUED | OUTPATIENT
Start: 2019-09-18 | End: 2019-09-19 | Stop reason: HOSPADM

## 2019-09-18 RX ORDER — ATORVASTATIN CALCIUM 40 MG/1
40 TABLET, FILM COATED ORAL DAILY
Status: DISCONTINUED | OUTPATIENT
Start: 2019-09-18 | End: 2019-09-19 | Stop reason: HOSPADM

## 2019-09-18 RX ORDER — ACETAMINOPHEN 325 MG/1
650 TABLET ORAL EVERY 4 HOURS PRN
Status: DISCONTINUED | OUTPATIENT
Start: 2019-09-18 | End: 2019-09-19 | Stop reason: HOSPADM

## 2019-09-18 RX ORDER — FLUTICASONE PROPIONATE 50 MCG
2 SPRAY, SUSPENSION (ML) NASAL EVERY MORNING
Status: DISCONTINUED | OUTPATIENT
Start: 2019-09-19 | End: 2019-09-19 | Stop reason: HOSPADM

## 2019-09-18 RX ORDER — SODIUM CHLORIDE 0.9 % (FLUSH) 0.9 %
10 SYRINGE (ML) INJECTION EVERY 12 HOURS SCHEDULED
Status: DISCONTINUED | OUTPATIENT
Start: 2019-09-18 | End: 2019-09-19 | Stop reason: HOSPADM

## 2019-09-18 RX ORDER — LEVOTHYROXINE SODIUM 0.07 MG/1
75 TABLET ORAL DAILY
Status: DISCONTINUED | OUTPATIENT
Start: 2019-09-19 | End: 2019-09-19 | Stop reason: HOSPADM

## 2019-09-18 RX ORDER — ONDANSETRON 2 MG/ML
4 INJECTION INTRAMUSCULAR; INTRAVENOUS EVERY 6 HOURS PRN
Status: DISCONTINUED | OUTPATIENT
Start: 2019-09-18 | End: 2019-09-19 | Stop reason: HOSPADM

## 2019-09-18 RX ORDER — LOPERAMIDE HYDROCHLORIDE 2 MG/1
2 CAPSULE ORAL 4 TIMES DAILY PRN
Status: DISCONTINUED | OUTPATIENT
Start: 2019-09-18 | End: 2019-09-19 | Stop reason: HOSPADM

## 2019-09-18 RX ORDER — BUMETANIDE 1 MG/1
2 TABLET ORAL 2 TIMES DAILY
Status: DISCONTINUED | OUTPATIENT
Start: 2019-09-18 | End: 2019-09-19 | Stop reason: HOSPADM

## 2019-09-18 RX ADMIN — INSULIN LISPRO 3 UNITS: 100 INJECTION, SOLUTION INTRAVENOUS; SUBCUTANEOUS at 20:24

## 2019-09-18 RX ADMIN — Medication 10 ML: at 20:22

## 2019-09-18 RX ADMIN — ACETAMINOPHEN 650 MG: 325 TABLET ORAL at 20:10

## 2019-09-18 RX ADMIN — ATORVASTATIN CALCIUM 40 MG: 40 TABLET, FILM COATED ORAL at 20:11

## 2019-09-18 RX ADMIN — HYDRALAZINE HYDROCHLORIDE 50 MG: 50 TABLET, FILM COATED ORAL at 20:11

## 2019-09-18 RX ADMIN — BUMETANIDE 2 MG: 1 TABLET ORAL at 20:11

## 2019-09-18 RX ADMIN — LISINOPRIL 20 MG: 20 TABLET ORAL at 20:11

## 2019-09-18 RX ADMIN — APIXABAN 5 MG: 5 TABLET, FILM COATED ORAL at 20:10

## 2019-09-18 RX ADMIN — INSULIN GLARGINE 55 UNITS: 100 INJECTION, SOLUTION SUBCUTANEOUS at 20:22

## 2019-09-18 RX ADMIN — IPRATROPIUM BROMIDE AND ALBUTEROL SULFATE 3 ML: .5; 3 SOLUTION RESPIRATORY (INHALATION) at 21:52

## 2019-09-18 ASSESSMENT — PAIN DESCRIPTION - ORIENTATION: ORIENTATION: MID

## 2019-09-18 ASSESSMENT — PAIN DESCRIPTION - LOCATION
LOCATION: ARM;LEG
LOCATION: CHEST

## 2019-09-18 ASSESSMENT — PAIN DESCRIPTION - PAIN TYPE
TYPE: CHRONIC PAIN
TYPE: ACUTE PAIN

## 2019-09-18 ASSESSMENT — PAIN DESCRIPTION - DIRECTION: RADIATING_TOWARDS: LEFT SIDE

## 2019-09-18 ASSESSMENT — PAIN SCALES - GENERAL
PAINLEVEL_OUTOF10: 7
PAINLEVEL_OUTOF10: 6

## 2019-09-18 ASSESSMENT — PAIN DESCRIPTION - DESCRIPTORS: DESCRIPTORS: ACHING

## 2019-09-18 NOTE — ED PROVIDER NOTES
HPI:  9/18/19,   Time: 8:47 PM       Eran Mijares is a 76 y.o. female presenting to the ED for chest pain. Patient has had chest pain intermittently for the past day. Nothing makes it better or worse, does not radiate anywhere. Denies any other symptoms or complaints. Review of Systems:   Pertinent positives and negatives are stated within HPI, all other systems reviewed and are negative.          --------------------------------------------- PAST HISTORY ---------------------------------------------  Past Medical History:  has a past medical history of Adenoma of left adrenal gland, DARVIN (acute kidney injury) ([address] Albuminuria, Anterior myocardial infarction ([address] Anxiety, Asthma, Atrial fibrillation ([address] CAD (coronary artery disease), CKD (chronic kidney disease) stage 3, GFR 30-59 ml/min ([facility]), Congestive heart failure, NYHA class 3 and ACC/AHA stage C ([address] COPD (chronic obstructive pulmonary disease) ([address] Dementia, Diabetes mellitus ([address] Diverticular disease, Diverticulitis, Dry eye, Elevated plasma metanephrines, Esophagitis, [place] grade A, Fecal incontinence, Fibromyalgia, Gastritis, Gastroesophageal reflux disease without esophagitis, Generalized OA, GI bleed, Hiatal hernia, History of methicillin resistant Staphylococcus aureus infection, Hyperlipidemia, Hyperplastic colon polyp, Hypertension, Hypokalemia, Hypothyroidism, Iron deficiency anemia, Left atrial dilation, Lumbar herniated disc, LVH (left ventricular hypertrophy), Microalbuminuria, STACEY (obstructive sleep apnea), Osteoporosis, Peripheral neuropathy, Post laminectomy syndrome, Primary osteoarthritis involving multiple joints, Primary osteoarthritis of both knees, Proteinuria, Pulmonary hypertension ([address] Syncope, Tobacco abuse, Valvular heart disease, and Vitamin D deficiency. Past Surgical History:  has a past surgical history that includes Carpal tunnel release (Bilateral);  Coronary angioplasty with stent (09/2013); 68.3 43.0 - 80.0 %    Immature Granulocytes % 0.3 0.0 - 5.0 %    Lymphocytes % 21.0 20.0 - 42.0 %    Monocytes % 9.1 2.0 - 12.0 %    Eosinophils % 0.9 0.0 - 6.0 %    Basophils % 0.4 0.0 - 2.0 %    Neutrophils Absolute 4.70 1.80 - 7.30 E9/L    Immature Granulocytes # 0.02 E9/L    Lymphocytes Absolute 1.45 (L) 1.50 - 4.00 E9/L    Monocytes Absolute 0.63 0.10 - 0.95 E9/L    Eosinophils Absolute 0.06 0.05 - 0.50 E9/L    Basophils Absolute 0.03 0.00 - 0.20 E9/L       RADIOLOGY:  Interpreted by Radiologist.  XR CHEST PORTABLE   Final Result   Cardiomegaly   Tortuous aorta   There are mild patchy infiltrates seen throughout both the lung   fields. Pulmonary vascular congestion could have this appearance                         EKG:  This EKG is signed and interpreted by the EP. Sinus, no STEMi    ------------------------- NURSING NOTES AND VITALS REVIEWED ---------------------------   The nursing notes within the ED encounter and vital signs as below have been reviewed by myself. /79   Pulse 86   Temp 96.7 °F (35.9 °C) (Temporal)   Resp 18   SpO2 98%   Oxygen Saturation Interpretation: Normal    The patients available past medical records and past encounters were reviewed. ------------------------------ ED COURSE/MEDICAL DECISION MAKING----------------------  Medications - No data to display      ED COURSE:       Medical Decision Making:    Labs and imaging reviewed, patient will be admitted. Counseling: The emergency provider has spoken with the patient and discussed todays results, in addition to providing specific details for the plan of care and counseling regarding the diagnosis and prognosis. Questions are answered at this time and they are agreeable with the plan.       --------------------------------- IMPRESSION AND DISPOSITION ---------------------------------    IMPRESSION  1.  Chest pain, unspecified type        DISPOSITION  Disposition: Admit to telemetry  Patient condition is

## 2019-09-19 VITALS
HEART RATE: 77 BPM | RESPIRATION RATE: 18 BRPM | DIASTOLIC BLOOD PRESSURE: 59 MMHG | BODY MASS INDEX: 42.34 KG/M2 | WEIGHT: 248 LBS | SYSTOLIC BLOOD PRESSURE: 125 MMHG | TEMPERATURE: 97.8 F | HEIGHT: 64 IN | OXYGEN SATURATION: 97 %

## 2019-09-19 LAB
FILM ARRAY ADENOVIRUS: NORMAL
FILM ARRAY BORDETELLA PERTUSSIS: NORMAL
FILM ARRAY CHLAMYDOPHILIA PNEUMONIAE: NORMAL
FILM ARRAY CORONAVIRUS 229E: NORMAL
FILM ARRAY CORONAVIRUS HKU1: NORMAL
FILM ARRAY CORONAVIRUS NL63: NORMAL
FILM ARRAY CORONAVIRUS OC43: NORMAL
FILM ARRAY INFLUENZA A VIRUS 09H1: NORMAL
FILM ARRAY INFLUENZA A VIRUS H1: NORMAL
FILM ARRAY INFLUENZA A VIRUS H3: NORMAL
FILM ARRAY INFLUENZA A VIRUS: NORMAL
FILM ARRAY INFLUENZA B: NORMAL
FILM ARRAY METAPNEUMOVIRUS: NORMAL
FILM ARRAY MYCOPLASMA PNEUMONIAE: NORMAL
FILM ARRAY PARAINFLUENZA VIRUS 1: NORMAL
FILM ARRAY PARAINFLUENZA VIRUS 2: NORMAL
FILM ARRAY PARAINFLUENZA VIRUS 3: NORMAL
FILM ARRAY PARAINFLUENZA VIRUS 4: NORMAL
FILM ARRAY RESPIRATORY SYNCITIAL VIRUS: NORMAL
FILM ARRAY RHINOVIRUS/ENTEROVIRUS: NORMAL
METER GLUCOSE: 161 MG/DL (ref 74–99)
METER GLUCOSE: 190 MG/DL (ref 74–99)
TROPONIN: 0.02 NG/ML (ref 0–0.03)

## 2019-09-19 PROCEDURE — G0378 HOSPITAL OBSERVATION PER HR: HCPCS

## 2019-09-19 PROCEDURE — 2700000000 HC OXYGEN THERAPY PER DAY

## 2019-09-19 PROCEDURE — 84484 ASSAY OF TROPONIN QUANT: CPT

## 2019-09-19 PROCEDURE — 82962 GLUCOSE BLOOD TEST: CPT

## 2019-09-19 PROCEDURE — APPSS60 APP SPLIT SHARED TIME 46-60 MINUTES: Performed by: NURSE PRACTITIONER

## 2019-09-19 PROCEDURE — 87798 DETECT AGENT NOS DNA AMP: CPT

## 2019-09-19 PROCEDURE — 6370000000 HC RX 637 (ALT 250 FOR IP): Performed by: INTERNAL MEDICINE

## 2019-09-19 PROCEDURE — 87486 CHLMYD PNEUM DNA AMP PROBE: CPT

## 2019-09-19 PROCEDURE — 99214 OFFICE O/P EST MOD 30 MIN: CPT | Performed by: INTERNAL MEDICINE

## 2019-09-19 PROCEDURE — 94640 AIRWAY INHALATION TREATMENT: CPT

## 2019-09-19 PROCEDURE — 87581 M.PNEUMON DNA AMP PROBE: CPT

## 2019-09-19 PROCEDURE — 87633 RESP VIRUS 12-25 TARGETS: CPT

## 2019-09-19 PROCEDURE — 36415 COLL VENOUS BLD VENIPUNCTURE: CPT

## 2019-09-19 RX ORDER — DEXTROSE MONOHYDRATE 50 MG/ML
100 INJECTION, SOLUTION INTRAVENOUS PRN
Status: DISCONTINUED | OUTPATIENT
Start: 2019-09-19 | End: 2019-09-19 | Stop reason: HOSPADM

## 2019-09-19 RX ORDER — NICOTINE POLACRILEX 4 MG
15 LOZENGE BUCCAL PRN
Status: DISCONTINUED | OUTPATIENT
Start: 2019-09-19 | End: 2019-09-19 | Stop reason: HOSPADM

## 2019-09-19 RX ORDER — FAMOTIDINE 20 MG/1
20 TABLET, FILM COATED ORAL DAILY
Qty: 60 TABLET | Refills: 5
Start: 2019-09-19 | End: 2019-11-26

## 2019-09-19 RX ORDER — DEXTROSE MONOHYDRATE 25 G/50ML
12.5 INJECTION, SOLUTION INTRAVENOUS PRN
Status: DISCONTINUED | OUTPATIENT
Start: 2019-09-19 | End: 2019-09-19 | Stop reason: HOSPADM

## 2019-09-19 RX ORDER — MECLIZINE HCL 12.5 MG/1
12.5 TABLET ORAL 3 TIMES DAILY PRN
Status: DISCONTINUED | OUTPATIENT
Start: 2019-09-19 | End: 2019-09-19 | Stop reason: HOSPADM

## 2019-09-19 RX ADMIN — HYDRALAZINE HYDROCHLORIDE 50 MG: 50 TABLET, FILM COATED ORAL at 09:12

## 2019-09-19 RX ADMIN — CARVEDILOL 25 MG: 25 TABLET, FILM COATED ORAL at 09:12

## 2019-09-19 RX ADMIN — IPRATROPIUM BROMIDE AND ALBUTEROL SULFATE 3 ML: .5; 3 SOLUTION RESPIRATORY (INHALATION) at 11:11

## 2019-09-19 RX ADMIN — ACETAMINOPHEN 650 MG: 325 TABLET ORAL at 15:11

## 2019-09-19 RX ADMIN — MECLIZINE 12.5 MG: 12.5 TABLET ORAL at 09:17

## 2019-09-19 RX ADMIN — FAMOTIDINE 20 MG: 20 TABLET ORAL at 09:12

## 2019-09-19 RX ADMIN — ASPIRIN 81 MG 81 MG: 81 TABLET ORAL at 09:11

## 2019-09-19 RX ADMIN — INSULIN LISPRO 2 UNITS: 100 INJECTION, SOLUTION INTRAVENOUS; SUBCUTANEOUS at 12:31

## 2019-09-19 RX ADMIN — LEVOTHYROXINE SODIUM 75 MCG: 0.07 TABLET ORAL at 07:01

## 2019-09-19 RX ADMIN — LISINOPRIL 20 MG: 20 TABLET ORAL at 09:12

## 2019-09-19 RX ADMIN — IPRATROPIUM BROMIDE AND ALBUTEROL SULFATE 3 ML: .5; 3 SOLUTION RESPIRATORY (INHALATION) at 07:33

## 2019-09-19 RX ADMIN — APIXABAN 5 MG: 5 TABLET, FILM COATED ORAL at 09:11

## 2019-09-19 RX ADMIN — MECLIZINE 12.5 MG: 12.5 TABLET ORAL at 15:11

## 2019-09-19 RX ADMIN — FLUTICASONE PROPIONATE 2 SPRAY: 50 SPRAY, METERED NASAL at 09:12

## 2019-09-19 RX ADMIN — ACETAMINOPHEN 650 MG: 325 TABLET ORAL at 04:50

## 2019-09-19 RX ADMIN — POTASSIUM BICARBONATE 20 MEQ: 782 TABLET, EFFERVESCENT ORAL at 09:12

## 2019-09-19 RX ADMIN — BUMETANIDE 2 MG: 1 TABLET ORAL at 09:12

## 2019-09-19 ASSESSMENT — PAIN SCALES - GENERAL
PAINLEVEL_OUTOF10: 0
PAINLEVEL_OUTOF10: 4
PAINLEVEL_OUTOF10: 6
PAINLEVEL_OUTOF10: 4
PAINLEVEL_OUTOF10: 0

## 2019-09-19 NOTE — CONSULTS
Inpatient Cardiology Consultation      Reason for Consult:  CP    Consulting Physician: Dr. Jennifer Snyder     Requesting Physician:  Dr. Emili Cardona    Date of Consultation: 9/19/2019    HISTORY OF PRESENT ILLNESS:   Ms. Amy Newton is a 76year old female who is previously known to Dr. Alina Ha and Dr. Jamil Patel. She was most recently seen in outpatient follow-up on 8/5/2019 for TATUM. ECHO (8/14/2019) showed LVEF 55% and VHD (Mod MR, Mild TR, Mod pulmonary HTN). She lives in an apartment by herself and uses a Rolator to get around. PMH: HTN, HLD, DM, COPD, CKD, Vertigo, STACEY (noncomplaint), Chronic AF on Eliquis, GERD, Hx of CABG x 4 (1997) s/p Cath 9/2013 at [facility] showing Native 3 vessel CAD with patent LIMA-LAD and patent stent in RCA with 3 occluded vein grafts, FAILED percutaneous intervention of the native circumflex, chronic total occulusion (medical management); Diastolic HF, ALLERGY to Iodides (anaphylaxis). Ms. Amy Newton presented [facility] 9/18/2019 with complaints of clear productive cough with intermittent mid-sternal chest \"heaviness\" that comes on usually after cough or taking in a deep breath x 2 days. Pain is reproducible with light palpation and is worsened with positional changes. Denies fever, chills, nausea, vomiting, abdominal pain, weight gain, LE edema and morbid obesity. She has chronic vertigo (worsening x 2 days PTA) and is on PRN antivert. Upon arrival to the ED: /79, HR 86, Afebrile, RA. Na+ 138, K+ 5.0, BUN/Cr 39/2.1, GFR 28, WBC 6.9, proBNP 2366, WBC 6.9, H/H 11.1, platelet count 256. CXR: cardiomegaly, mild patchy infiltrates seen throughout both lung fields, pulmonary vascular congestion can give this appearance. EKG: AF, NSSTT changes, rate 85 bpm.     Please note: past medical records were reviewed per electronic medical record (EMR) - see detailed reports under Past Medical/ Surgical History. Past Medical History:    1.  CABG x 4 at [facility] in 1997 SVG to OM, SVG to 7:42 AM         independently performed an evaluation on the patient. I have reviewed the above documentation completed by the advance practitioner. Please see my additional contributions to the HPI, physical exam, assessment and medical decision making.     Medical history:  1. CABG x 4 at [facility] in 1997 SVG to OM, SVG to diagonal, SVG to PDA and LIMA to LAD. 2. Cath 12/2012 [facility]: KIMANI to distal RCA. Stents in the mid to distal LAD are widely patent without significant in-stent restenosis. SEVERE 3 vessel disease with occluded 3 out of 4 bypass grafts.   3. Cath 9/2013 [facility]: Native 3 vessel CAD with patent LIMA to LAD and patent stent in RCA with 3 occluded vein grafts. FAILED percutaneous intervention of the native circumflex, chronic total occulusion. Recommendations: continued medical therapy and aggressive risk factor modification. 4. MPS 7/24/2015 at [facility]: Equivocal apical ischemia. Mild lateral ischemia. Equivocal inferior, inferolateral ischemia. Equivocal anterior infarction  5. Hx of HLD, COPD, tobacco abuse, CKD (baseline Cr 1.2/1.5), obesity, anxiety, depression, GI bleed, Afib (3/23/2016). 6. Chronic anticoagulation with Eliquis for elevated CVA risk. 7. Echo, 02/03/2017, biplane EF = 47%.  Moderate MR.  ERO = 0.22 cm². TR velocity 3.76.  LA size = 55 cc/m².     Echo Summary 8/15/19:   Ejection fraction is visually estimated at 55%.   No regional wall motion abnormalities seen.   Mildly dilated right ventricle structure with reduced function. TAPSE is 1.5 cm.   Left atrial volume index of 67 ml per meters squared BSA.   At least moderate eccentric posteriorly directed mitral regurgitation is present.   Mild tricuspid regurgitation.   Pulmonary hypertension is moderate. RVSP is 65 mmHg.     See accompanying documentation for full consult.     ASSESSMENT AND PLAN:  1. CAD-CABG/CP:      Atypical chest pain. EKG and CE okay.      Medically manage.     BB/ASA/statin/nitrate/Ranexa.     2.  CHF:

## 2019-09-20 LAB
EKG ATRIAL RATE: 101 BPM
EKG Q-T INTERVAL: 404 MS
EKG QRS DURATION: 80 MS
EKG QTC CALCULATION (BAZETT): 480 MS
EKG R AXIS: -11 DEGREES
EKG T AXIS: 7 DEGREES
EKG VENTRICULAR RATE: 85 BPM

## 2019-09-20 PROCEDURE — 93010 ELECTROCARDIOGRAM REPORT: CPT | Performed by: INTERNAL MEDICINE

## 2019-09-23 ENCOUNTER — TELEPHONE (OUTPATIENT)
Dept: ENDOCRINOLOGY | Age: 74
End: 2019-09-23

## 2019-09-25 ENCOUNTER — HOSPITAL ENCOUNTER (INPATIENT)
Age: 74
LOS: 5 days | Discharge: NURSING, REHAB OR LONG-TERM CARE | DRG: 291 | End: 2019-09-30
Attending: EMERGENCY MEDICINE | Admitting: INTERNAL MEDICINE
Payer: MEDICARE

## 2019-09-25 ENCOUNTER — APPOINTMENT (OUTPATIENT)
Dept: GENERAL RADIOLOGY | Age: 74
DRG: 291 | End: 2019-09-25
Payer: MEDICARE

## 2019-09-25 ENCOUNTER — OFFICE VISIT (OUTPATIENT)
Dept: PRIMARY CARE CLINIC | Age: 74
End: 2019-09-25
Payer: MEDICARE

## 2019-09-25 VITALS
DIASTOLIC BLOOD PRESSURE: 72 MMHG | BODY MASS INDEX: 42.34 KG/M2 | SYSTOLIC BLOOD PRESSURE: 120 MMHG | OXYGEN SATURATION: 97 % | RESPIRATION RATE: 18 BRPM | HEIGHT: 64 IN | WEIGHT: 248 LBS | TEMPERATURE: 98.2 F | HEART RATE: 96 BPM

## 2019-09-25 DIAGNOSIS — R90.89 ABNORMAL BRAIN MRI: ICD-10-CM

## 2019-09-25 DIAGNOSIS — M15.9 PRIMARY OSTEOARTHRITIS INVOLVING MULTIPLE JOINTS: ICD-10-CM

## 2019-09-25 DIAGNOSIS — R42 DIZZINESS: Primary | ICD-10-CM

## 2019-09-25 DIAGNOSIS — I50.23 ACUTE ON CHRONIC SYSTOLIC CONGESTIVE HEART FAILURE (HCC): Primary | ICD-10-CM

## 2019-09-25 DIAGNOSIS — R06.02 SOB (SHORTNESS OF BREATH): ICD-10-CM

## 2019-09-25 DIAGNOSIS — I50.9 CONGESTIVE HEART FAILURE, UNSPECIFIED HF CHRONICITY, UNSPECIFIED HEART FAILURE TYPE (HCC): ICD-10-CM

## 2019-09-25 PROBLEM — Z79.4 TYPE 2 DIABETES MELLITUS, WITH LONG-TERM CURRENT USE OF INSULIN (HCC): Chronic | Status: ACTIVE | Noted: 2019-09-25

## 2019-09-25 PROBLEM — I34.0 MITRAL REGURGITATION: Chronic | Status: ACTIVE | Noted: 2019-09-25

## 2019-09-25 PROBLEM — Z79.01 CHRONIC ANTICOAGULATION: Chronic | Status: ACTIVE | Noted: 2019-09-25

## 2019-09-25 PROBLEM — E11.9 TYPE 2 DIABETES MELLITUS, WITH LONG-TERM CURRENT USE OF INSULIN (HCC): Chronic | Status: ACTIVE | Noted: 2019-09-25

## 2019-09-25 PROBLEM — I50.33 ACUTE ON CHRONIC DIASTOLIC CHF (CONGESTIVE HEART FAILURE) (HCC): Status: ACTIVE | Noted: 2019-09-25

## 2019-09-25 LAB
ALBUMIN SERPL-MCNC: 3.9 G/DL (ref 3.5–5.2)
ALP BLD-CCNC: 108 U/L (ref 35–104)
ALT SERPL-CCNC: 14 U/L (ref 0–32)
ANION GAP SERPL CALCULATED.3IONS-SCNC: 10 MMOL/L (ref 7–16)
AST SERPL-CCNC: 15 U/L (ref 0–31)
BILIRUB SERPL-MCNC: 0.3 MG/DL (ref 0–1.2)
BUN BLDV-MCNC: 47 MG/DL (ref 8–23)
CALCIUM SERPL-MCNC: 9.3 MG/DL (ref 8.6–10.2)
CHLORIDE BLD-SCNC: 103 MMOL/L (ref 98–107)
CO2: 27 MMOL/L (ref 22–29)
CREAT SERPL-MCNC: 2 MG/DL (ref 0.5–1)
EKG ATRIAL RATE: 250 BPM
EKG Q-T INTERVAL: 388 MS
EKG QRS DURATION: 84 MS
EKG QTC CALCULATION (BAZETT): 461 MS
EKG R AXIS: 14 DEGREES
EKG T AXIS: -45 DEGREES
EKG VENTRICULAR RATE: 85 BPM
GFR AFRICAN AMERICAN: 29
GFR NON-AFRICAN AMERICAN: 29 ML/MIN/1.73
GLUCOSE BLD-MCNC: 215 MG/DL (ref 74–99)
HCT VFR BLD CALC: 39.1 % (ref 34–48)
HEMOGLOBIN: 12.2 G/DL (ref 11.5–15.5)
MCH RBC QN AUTO: 28.2 PG (ref 26–35)
MCHC RBC AUTO-ENTMCNC: 31.2 % (ref 32–34.5)
MCV RBC AUTO: 90.3 FL (ref 80–99.9)
PDW BLD-RTO: 15.4 FL (ref 11.5–15)
PLATELET # BLD: 187 E9/L (ref 130–450)
PMV BLD AUTO: 11.8 FL (ref 7–12)
POTASSIUM SERPL-SCNC: 4.5 MMOL/L (ref 3.5–5)
PRO-BNP: 3771 PG/ML (ref 0–450)
RBC # BLD: 4.33 E12/L (ref 3.5–5.5)
SODIUM BLD-SCNC: 140 MMOL/L (ref 132–146)
TOTAL PROTEIN: 7.2 G/DL (ref 6.4–8.3)
TROPONIN: 0.02 NG/ML (ref 0–0.03)
WBC # BLD: 6.3 E9/L (ref 4.5–11.5)

## 2019-09-25 PROCEDURE — 1123F ACP DISCUSS/DSCN MKR DOCD: CPT | Performed by: FAMILY MEDICINE

## 2019-09-25 PROCEDURE — 4040F PNEUMOC VAC/ADMIN/RCVD: CPT | Performed by: FAMILY MEDICINE

## 2019-09-25 PROCEDURE — 99285 EMERGENCY DEPT VISIT HI MDM: CPT

## 2019-09-25 PROCEDURE — G8598 ASA/ANTIPLAT THER USED: HCPCS | Performed by: FAMILY MEDICINE

## 2019-09-25 PROCEDURE — 85027 COMPLETE CBC AUTOMATED: CPT

## 2019-09-25 PROCEDURE — 1200000000 HC SEMI PRIVATE

## 2019-09-25 PROCEDURE — 80053 COMPREHEN METABOLIC PANEL: CPT

## 2019-09-25 PROCEDURE — 3017F COLORECTAL CA SCREEN DOC REV: CPT | Performed by: FAMILY MEDICINE

## 2019-09-25 PROCEDURE — 84484 ASSAY OF TROPONIN QUANT: CPT

## 2019-09-25 PROCEDURE — G8427 DOCREV CUR MEDS BY ELIG CLIN: HCPCS | Performed by: FAMILY MEDICINE

## 2019-09-25 PROCEDURE — G8417 CALC BMI ABV UP PARAM F/U: HCPCS | Performed by: FAMILY MEDICINE

## 2019-09-25 PROCEDURE — 36415 COLL VENOUS BLD VENIPUNCTURE: CPT

## 2019-09-25 PROCEDURE — 99214 OFFICE O/P EST MOD 30 MIN: CPT | Performed by: FAMILY MEDICINE

## 2019-09-25 PROCEDURE — 93005 ELECTROCARDIOGRAM TRACING: CPT | Performed by: NURSE PRACTITIONER

## 2019-09-25 PROCEDURE — 1036F TOBACCO NON-USER: CPT | Performed by: FAMILY MEDICINE

## 2019-09-25 PROCEDURE — 6360000002 HC RX W HCPCS: Performed by: EMERGENCY MEDICINE

## 2019-09-25 PROCEDURE — 96374 THER/PROPH/DIAG INJ IV PUSH: CPT

## 2019-09-25 PROCEDURE — 83880 ASSAY OF NATRIURETIC PEPTIDE: CPT

## 2019-09-25 PROCEDURE — G8399 PT W/DXA RESULTS DOCUMENT: HCPCS | Performed by: FAMILY MEDICINE

## 2019-09-25 PROCEDURE — 1090F PRES/ABSN URINE INCON ASSESS: CPT | Performed by: FAMILY MEDICINE

## 2019-09-25 RX ORDER — INSULIN GLARGINE 100 [IU]/ML
55 INJECTION, SOLUTION SUBCUTANEOUS NIGHTLY
Status: DISCONTINUED | OUTPATIENT
Start: 2019-09-25 | End: 2019-09-30 | Stop reason: HOSPADM

## 2019-09-25 RX ORDER — LEVOTHYROXINE SODIUM 0.07 MG/1
75 TABLET ORAL DAILY
Status: DISCONTINUED | OUTPATIENT
Start: 2019-09-26 | End: 2019-09-30 | Stop reason: HOSPADM

## 2019-09-25 RX ORDER — BUMETANIDE 0.25 MG/ML
2 INJECTION, SOLUTION INTRAMUSCULAR; INTRAVENOUS 2 TIMES DAILY
Status: DISCONTINUED | OUTPATIENT
Start: 2019-09-26 | End: 2019-09-28

## 2019-09-25 RX ORDER — HYDRALAZINE HYDROCHLORIDE 25 MG/1
50 TABLET, FILM COATED ORAL 3 TIMES DAILY
Status: DISCONTINUED | OUTPATIENT
Start: 2019-09-25 | End: 2019-09-30 | Stop reason: HOSPADM

## 2019-09-25 RX ORDER — ASPIRIN 81 MG/1
81 TABLET ORAL EVERY MORNING
Status: DISCONTINUED | OUTPATIENT
Start: 2019-09-26 | End: 2019-09-30 | Stop reason: HOSPADM

## 2019-09-25 RX ORDER — FUROSEMIDE 10 MG/ML
80 INJECTION INTRAMUSCULAR; INTRAVENOUS ONCE
Status: COMPLETED | OUTPATIENT
Start: 2019-09-25 | End: 2019-09-25

## 2019-09-25 RX ORDER — TRAMADOL HYDROCHLORIDE 50 MG/1
50 TABLET ORAL EVERY 6 HOURS PRN
Qty: 120 TABLET | Refills: 1 | Status: ON HOLD | OUTPATIENT
Start: 2019-09-25 | End: 2019-09-30 | Stop reason: HOSPADM

## 2019-09-25 RX ORDER — SODIUM CHLORIDE 0.9 % (FLUSH) 0.9 %
10 SYRINGE (ML) INJECTION EVERY 12 HOURS SCHEDULED
Status: DISCONTINUED | OUTPATIENT
Start: 2019-09-25 | End: 2019-09-30 | Stop reason: HOSPADM

## 2019-09-25 RX ORDER — LISINOPRIL 20 MG/1
20 TABLET ORAL 2 TIMES DAILY
Status: DISCONTINUED | OUTPATIENT
Start: 2019-09-25 | End: 2019-09-30 | Stop reason: HOSPADM

## 2019-09-25 RX ORDER — FAMOTIDINE 20 MG/1
20 TABLET, FILM COATED ORAL DAILY
Status: DISCONTINUED | OUTPATIENT
Start: 2019-09-26 | End: 2019-09-30 | Stop reason: HOSPADM

## 2019-09-25 RX ORDER — ONDANSETRON 2 MG/ML
4 INJECTION INTRAMUSCULAR; INTRAVENOUS EVERY 6 HOURS PRN
Status: DISCONTINUED | OUTPATIENT
Start: 2019-09-25 | End: 2019-09-30 | Stop reason: HOSPADM

## 2019-09-25 RX ORDER — IPRATROPIUM BROMIDE AND ALBUTEROL SULFATE 2.5; .5 MG/3ML; MG/3ML
1 SOLUTION RESPIRATORY (INHALATION)
Status: DISCONTINUED | OUTPATIENT
Start: 2019-09-26 | End: 2019-09-30 | Stop reason: HOSPADM

## 2019-09-25 RX ORDER — CARVEDILOL 25 MG/1
25 TABLET ORAL 2 TIMES DAILY WITH MEALS
Status: DISCONTINUED | OUTPATIENT
Start: 2019-09-26 | End: 2019-09-30 | Stop reason: HOSPADM

## 2019-09-25 RX ORDER — LOPERAMIDE HYDROCHLORIDE 2 MG/1
2 CAPSULE ORAL 4 TIMES DAILY PRN
Status: DISCONTINUED | OUTPATIENT
Start: 2019-09-25 | End: 2019-09-30 | Stop reason: HOSPADM

## 2019-09-25 RX ORDER — ATORVASTATIN CALCIUM 40 MG/1
40 TABLET, FILM COATED ORAL DAILY
Status: DISCONTINUED | OUTPATIENT
Start: 2019-09-26 | End: 2019-09-30 | Stop reason: HOSPADM

## 2019-09-25 RX ORDER — FLUTICASONE PROPIONATE 50 MCG
2 SPRAY, SUSPENSION (ML) NASAL EVERY MORNING
Status: DISCONTINUED | OUTPATIENT
Start: 2019-09-26 | End: 2019-09-30 | Stop reason: HOSPADM

## 2019-09-25 RX ORDER — ACETAMINOPHEN 325 MG/1
650 TABLET ORAL EVERY 4 HOURS PRN
Status: DISCONTINUED | OUTPATIENT
Start: 2019-09-25 | End: 2019-09-30 | Stop reason: HOSPADM

## 2019-09-25 RX ORDER — ALBUTEROL SULFATE 90 UG/1
2 AEROSOL, METERED RESPIRATORY (INHALATION) EVERY 6 HOURS PRN
Status: DISCONTINUED | OUTPATIENT
Start: 2019-09-25 | End: 2019-09-30 | Stop reason: HOSPADM

## 2019-09-25 RX ORDER — TRAMADOL HYDROCHLORIDE 50 MG/1
50 TABLET ORAL EVERY 6 HOURS PRN
Status: DISCONTINUED | OUTPATIENT
Start: 2019-09-25 | End: 2019-09-30 | Stop reason: HOSPADM

## 2019-09-25 RX ORDER — SODIUM CHLORIDE 0.9 % (FLUSH) 0.9 %
10 SYRINGE (ML) INJECTION PRN
Status: DISCONTINUED | OUTPATIENT
Start: 2019-09-25 | End: 2019-09-30 | Stop reason: HOSPADM

## 2019-09-25 RX ADMIN — FUROSEMIDE 80 MG: 10 INJECTION, SOLUTION INTRAMUSCULAR; INTRAVENOUS at 21:52

## 2019-09-25 ASSESSMENT — ENCOUNTER SYMPTOMS
COLOR CHANGE: 0
DIARRHEA: 0
FACIAL SWELLING: 0
BACK PAIN: 0
ABDOMINAL PAIN: 0
SHORTNESS OF BREATH: 0
WHEEZING: 0
ALLERGIC/IMMUNOLOGIC NEGATIVE: 1
BLOOD IN STOOL: 0
PHOTOPHOBIA: 0
VOMITING: 0
SORE THROAT: 0
APNEA: 0
CHEST TIGHTNESS: 0
SINUS PRESSURE: 0
COUGH: 0
NAUSEA: 0

## 2019-09-25 ASSESSMENT — PAIN SCALES - WONG BAKER: WONGBAKER_NUMERICALRESPONSE: 8

## 2019-09-25 ASSESSMENT — PAIN DESCRIPTION - LOCATION: LOCATION: GENERALIZED

## 2019-09-25 ASSESSMENT — PAIN DESCRIPTION - PAIN TYPE: TYPE: ACUTE PAIN

## 2019-09-25 ASSESSMENT — PAIN DESCRIPTION - PROGRESSION: CLINICAL_PROGRESSION: GRADUALLY WORSENING

## 2019-09-25 ASSESSMENT — PAIN DESCRIPTION - DESCRIPTORS: DESCRIPTORS: ACHING

## 2019-09-25 ASSESSMENT — PAIN DESCRIPTION - FREQUENCY: FREQUENCY: CONTINUOUS

## 2019-09-25 NOTE — PROGRESS NOTES
mL 4    nitroGLYCERIN (NITROSTAT) 0.4 MG SL tablet Place 1 tablet under the tongue every 5 minutes as needed for Chest pain up to max of 3 total doses. If no relief after 1 dose, call 911. 75 tablet 4    Misc. Devices (RAISED TOILET SEAT) MISC For OA involving multiple sites 1 each 0    aspirin 81 MG tablet Take 81 mg by mouth every morning        No current facility-administered medications for this visit. 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 drinks 2 glasses of  diet coke daily, occasional peach ice tea.      Drug use: No     Comment: denies    Sexual activity: Not Currently     Partners: Male   Lifestyle    Physical activity:     Days per week: None     Minutes per session: None    Stress: None   Relationships    Social

## 2019-09-26 LAB
ANION GAP SERPL CALCULATED.3IONS-SCNC: 15 MMOL/L (ref 7–16)
BUN BLDV-MCNC: 43 MG/DL (ref 8–23)
CALCIUM SERPL-MCNC: 9 MG/DL (ref 8.6–10.2)
CHLORIDE BLD-SCNC: 100 MMOL/L (ref 98–107)
CO2: 25 MMOL/L (ref 22–29)
CREAT SERPL-MCNC: 2.1 MG/DL (ref 0.5–1)
GFR AFRICAN AMERICAN: 28
GFR NON-AFRICAN AMERICAN: 28 ML/MIN/1.73
GLUCOSE BLD-MCNC: 237 MG/DL (ref 74–99)
METER GLUCOSE: 194 MG/DL (ref 74–99)
METER GLUCOSE: 241 MG/DL (ref 74–99)
METER GLUCOSE: 250 MG/DL (ref 74–99)
METER GLUCOSE: 256 MG/DL (ref 74–99)
METER GLUCOSE: 267 MG/DL (ref 74–99)
POTASSIUM SERPL-SCNC: 5 MMOL/L (ref 3.5–5)
SODIUM BLD-SCNC: 140 MMOL/L (ref 132–146)

## 2019-09-26 PROCEDURE — 94761 N-INVAS EAR/PLS OXIMETRY MLT: CPT

## 2019-09-26 PROCEDURE — 6370000000 HC RX 637 (ALT 250 FOR IP): Performed by: INTERNAL MEDICINE

## 2019-09-26 PROCEDURE — 94760 N-INVAS EAR/PLS OXIMETRY 1: CPT

## 2019-09-26 PROCEDURE — 94640 AIRWAY INHALATION TREATMENT: CPT

## 2019-09-26 PROCEDURE — 1200000000 HC SEMI PRIVATE

## 2019-09-26 PROCEDURE — 97162 PT EVAL MOD COMPLEX 30 MIN: CPT | Performed by: PHYSICAL THERAPIST

## 2019-09-26 PROCEDURE — APPSS60 APP SPLIT SHARED TIME 46-60 MINUTES: Performed by: NURSE PRACTITIONER

## 2019-09-26 PROCEDURE — 97530 THERAPEUTIC ACTIVITIES: CPT | Performed by: PHYSICAL THERAPIST

## 2019-09-26 PROCEDURE — 97530 THERAPEUTIC ACTIVITIES: CPT

## 2019-09-26 PROCEDURE — 36415 COLL VENOUS BLD VENIPUNCTURE: CPT

## 2019-09-26 PROCEDURE — 97166 OT EVAL MOD COMPLEX 45 MIN: CPT

## 2019-09-26 PROCEDURE — 80048 BASIC METABOLIC PNL TOTAL CA: CPT

## 2019-09-26 PROCEDURE — 2580000003 HC RX 258: Performed by: INTERNAL MEDICINE

## 2019-09-26 PROCEDURE — 82962 GLUCOSE BLOOD TEST: CPT

## 2019-09-26 PROCEDURE — 2500000003 HC RX 250 WO HCPCS: Performed by: INTERNAL MEDICINE

## 2019-09-26 PROCEDURE — 99222 1ST HOSP IP/OBS MODERATE 55: CPT | Performed by: INTERNAL MEDICINE

## 2019-09-26 RX ORDER — DEXTROSE MONOHYDRATE 50 MG/ML
100 INJECTION, SOLUTION INTRAVENOUS PRN
Status: DISCONTINUED | OUTPATIENT
Start: 2019-09-26 | End: 2019-09-30 | Stop reason: HOSPADM

## 2019-09-26 RX ORDER — NICOTINE POLACRILEX 4 MG
15 LOZENGE BUCCAL PRN
Status: DISCONTINUED | OUTPATIENT
Start: 2019-09-26 | End: 2019-09-30 | Stop reason: HOSPADM

## 2019-09-26 RX ORDER — MECLIZINE HCL 12.5 MG/1
12.5 TABLET ORAL 3 TIMES DAILY PRN
Status: DISCONTINUED | OUTPATIENT
Start: 2019-09-26 | End: 2019-09-30 | Stop reason: HOSPADM

## 2019-09-26 RX ORDER — DEXTROSE MONOHYDRATE 25 G/50ML
12.5 INJECTION, SOLUTION INTRAVENOUS PRN
Status: DISCONTINUED | OUTPATIENT
Start: 2019-09-26 | End: 2019-09-30 | Stop reason: HOSPADM

## 2019-09-26 RX ADMIN — BUMETANIDE 2 MG: 0.25 INJECTION INTRAMUSCULAR; INTRAVENOUS at 09:29

## 2019-09-26 RX ADMIN — CARVEDILOL 25 MG: 25 TABLET, FILM COATED ORAL at 18:39

## 2019-09-26 RX ADMIN — Medication 10 ML: at 22:12

## 2019-09-26 RX ADMIN — LEVOTHYROXINE SODIUM 75 MCG: 75 TABLET ORAL at 09:22

## 2019-09-26 RX ADMIN — TRAMADOL HYDROCHLORIDE 50 MG: 50 TABLET ORAL at 06:41

## 2019-09-26 RX ADMIN — IPRATROPIUM BROMIDE AND ALBUTEROL SULFATE 3 ML: .5; 3 SOLUTION RESPIRATORY (INHALATION) at 15:51

## 2019-09-26 RX ADMIN — TRAMADOL HYDROCHLORIDE 50 MG: 50 TABLET ORAL at 00:13

## 2019-09-26 RX ADMIN — IPRATROPIUM BROMIDE AND ALBUTEROL SULFATE 3 ML: .5; 3 SOLUTION RESPIRATORY (INHALATION) at 12:09

## 2019-09-26 RX ADMIN — MECLIZINE 12.5 MG: 12.5 TABLET ORAL at 22:11

## 2019-09-26 RX ADMIN — Medication 10 ML: at 00:20

## 2019-09-26 RX ADMIN — CARVEDILOL 25 MG: 25 TABLET, FILM COATED ORAL at 09:23

## 2019-09-26 RX ADMIN — IPRATROPIUM BROMIDE AND ALBUTEROL SULFATE 3 ML: .5; 3 SOLUTION RESPIRATORY (INHALATION) at 19:21

## 2019-09-26 RX ADMIN — HYDRALAZINE HYDROCHLORIDE 50 MG: 25 TABLET, FILM COATED ORAL at 22:16

## 2019-09-26 RX ADMIN — INSULIN GLARGINE 55 UNITS: 100 INJECTION, SOLUTION SUBCUTANEOUS at 22:30

## 2019-09-26 RX ADMIN — APIXABAN 5 MG: 5 TABLET, FILM COATED ORAL at 09:22

## 2019-09-26 RX ADMIN — INSULIN LISPRO 3 UNITS: 100 INJECTION, SOLUTION INTRAVENOUS; SUBCUTANEOUS at 00:16

## 2019-09-26 RX ADMIN — ATORVASTATIN CALCIUM 40 MG: 40 TABLET, FILM COATED ORAL at 09:22

## 2019-09-26 RX ADMIN — MECLIZINE 12.5 MG: 12.5 TABLET ORAL at 18:42

## 2019-09-26 RX ADMIN — INSULIN LISPRO 3 UNITS: 100 INJECTION, SOLUTION INTRAVENOUS; SUBCUTANEOUS at 22:30

## 2019-09-26 RX ADMIN — BUMETANIDE 2 MG: 0.25 INJECTION INTRAMUSCULAR; INTRAVENOUS at 22:12

## 2019-09-26 RX ADMIN — FAMOTIDINE 20 MG: 20 TABLET ORAL at 09:21

## 2019-09-26 RX ADMIN — TRAMADOL HYDROCHLORIDE 50 MG: 50 TABLET ORAL at 22:10

## 2019-09-26 RX ADMIN — LISINOPRIL 20 MG: 20 TABLET ORAL at 22:15

## 2019-09-26 RX ADMIN — Medication 10 ML: at 09:25

## 2019-09-26 RX ADMIN — HYDRALAZINE HYDROCHLORIDE 50 MG: 25 TABLET, FILM COATED ORAL at 00:14

## 2019-09-26 RX ADMIN — TRAMADOL HYDROCHLORIDE 50 MG: 50 TABLET ORAL at 14:27

## 2019-09-26 RX ADMIN — LISINOPRIL 20 MG: 20 TABLET ORAL at 00:07

## 2019-09-26 RX ADMIN — LISINOPRIL 20 MG: 20 TABLET ORAL at 09:22

## 2019-09-26 RX ADMIN — INSULIN LISPRO 4 UNITS: 100 INJECTION, SOLUTION INTRAVENOUS; SUBCUTANEOUS at 18:39

## 2019-09-26 RX ADMIN — INSULIN LISPRO 2 UNITS: 100 INJECTION, SOLUTION INTRAVENOUS; SUBCUTANEOUS at 09:18

## 2019-09-26 RX ADMIN — APIXABAN 5 MG: 5 TABLET, FILM COATED ORAL at 22:11

## 2019-09-26 RX ADMIN — FLUTICASONE PROPIONATE 2 SPRAY: 50 SPRAY, METERED NASAL at 09:30

## 2019-09-26 RX ADMIN — HYDRALAZINE HYDROCHLORIDE 50 MG: 25 TABLET, FILM COATED ORAL at 09:22

## 2019-09-26 RX ADMIN — APIXABAN 5 MG: 5 TABLET, FILM COATED ORAL at 00:06

## 2019-09-26 RX ADMIN — HYDRALAZINE HYDROCHLORIDE 50 MG: 25 TABLET, FILM COATED ORAL at 14:27

## 2019-09-26 RX ADMIN — INSULIN LISPRO 6 UNITS: 100 INJECTION, SOLUTION INTRAVENOUS; SUBCUTANEOUS at 12:02

## 2019-09-26 RX ADMIN — ASPIRIN 81 MG: 81 TABLET, COATED ORAL at 09:22

## 2019-09-26 ASSESSMENT — PAIN DESCRIPTION - LOCATION
LOCATION: GENERALIZED;SHOULDER
LOCATION: GENERALIZED;SHOULDER
LOCATION: GENERALIZED

## 2019-09-26 ASSESSMENT — PAIN SCALES - GENERAL
PAINLEVEL_OUTOF10: 9
PAINLEVEL_OUTOF10: 6
PAINLEVEL_OUTOF10: 8
PAINLEVEL_OUTOF10: 6
PAINLEVEL_OUTOF10: 10
PAINLEVEL_OUTOF10: 7
PAINLEVEL_OUTOF10: 3

## 2019-09-26 ASSESSMENT — PAIN DESCRIPTION - ONSET
ONSET: ON-GOING

## 2019-09-26 ASSESSMENT — PAIN DESCRIPTION - DESCRIPTORS
DESCRIPTORS: ACHING;CONSTANT
DESCRIPTORS: ACHING
DESCRIPTORS: ACHING;CONSTANT
DESCRIPTORS: ACHING;CONSTANT

## 2019-09-26 ASSESSMENT — PAIN DESCRIPTION - FREQUENCY
FREQUENCY: CONTINUOUS

## 2019-09-26 ASSESSMENT — PAIN DESCRIPTION - PAIN TYPE
TYPE: CHRONIC PAIN

## 2019-09-26 ASSESSMENT — PAIN DESCRIPTION - DIRECTION
RADIATING_TOWARDS: LT SIDE
RADIATING_TOWARDS: LT SIDE

## 2019-09-27 LAB
ANION GAP SERPL CALCULATED.3IONS-SCNC: 14 MMOL/L (ref 7–16)
BUN BLDV-MCNC: 43 MG/DL (ref 8–23)
CALCIUM SERPL-MCNC: 8.8 MG/DL (ref 8.6–10.2)
CHLORIDE BLD-SCNC: 100 MMOL/L (ref 98–107)
CO2: 24 MMOL/L (ref 22–29)
CREAT SERPL-MCNC: 1.9 MG/DL (ref 0.5–1)
GFR AFRICAN AMERICAN: 31
GFR NON-AFRICAN AMERICAN: 31 ML/MIN/1.73
GLUCOSE BLD-MCNC: 173 MG/DL (ref 74–99)
HCT VFR BLD CALC: 34.8 % (ref 34–48)
HEMOGLOBIN: 10.6 G/DL (ref 11.5–15.5)
MCH RBC QN AUTO: 27.5 PG (ref 26–35)
MCHC RBC AUTO-ENTMCNC: 30.5 % (ref 32–34.5)
MCV RBC AUTO: 90.2 FL (ref 80–99.9)
METER GLUCOSE: 155 MG/DL (ref 74–99)
METER GLUCOSE: 174 MG/DL (ref 74–99)
METER GLUCOSE: 192 MG/DL (ref 74–99)
METER GLUCOSE: 241 MG/DL (ref 74–99)
PDW BLD-RTO: 15.1 FL (ref 11.5–15)
PHOSPHORUS: 4.4 MG/DL (ref 2.5–4.5)
PLATELET # BLD: 169 E9/L (ref 130–450)
PMV BLD AUTO: 11.8 FL (ref 7–12)
POTASSIUM SERPL-SCNC: 4.2 MMOL/L (ref 3.5–5)
RBC # BLD: 3.86 E12/L (ref 3.5–5.5)
SODIUM BLD-SCNC: 138 MMOL/L (ref 132–146)
WBC # BLD: 5.3 E9/L (ref 4.5–11.5)

## 2019-09-27 PROCEDURE — 80048 BASIC METABOLIC PNL TOTAL CA: CPT

## 2019-09-27 PROCEDURE — 84100 ASSAY OF PHOSPHORUS: CPT

## 2019-09-27 PROCEDURE — 36415 COLL VENOUS BLD VENIPUNCTURE: CPT

## 2019-09-27 PROCEDURE — 94640 AIRWAY INHALATION TREATMENT: CPT

## 2019-09-27 PROCEDURE — 99232 SBSQ HOSP IP/OBS MODERATE 35: CPT | Performed by: NURSE PRACTITIONER

## 2019-09-27 PROCEDURE — 2580000003 HC RX 258: Performed by: INTERNAL MEDICINE

## 2019-09-27 PROCEDURE — 82962 GLUCOSE BLOOD TEST: CPT

## 2019-09-27 PROCEDURE — 6370000000 HC RX 637 (ALT 250 FOR IP): Performed by: INTERNAL MEDICINE

## 2019-09-27 PROCEDURE — 85027 COMPLETE CBC AUTOMATED: CPT

## 2019-09-27 PROCEDURE — 2500000003 HC RX 250 WO HCPCS: Performed by: INTERNAL MEDICINE

## 2019-09-27 PROCEDURE — 1200000000 HC SEMI PRIVATE

## 2019-09-27 RX ADMIN — IPRATROPIUM BROMIDE AND ALBUTEROL SULFATE 3 ML: .5; 3 SOLUTION RESPIRATORY (INHALATION) at 16:05

## 2019-09-27 RX ADMIN — TRAMADOL HYDROCHLORIDE 50 MG: 50 TABLET ORAL at 08:54

## 2019-09-27 RX ADMIN — APIXABAN 5 MG: 5 TABLET, FILM COATED ORAL at 22:01

## 2019-09-27 RX ADMIN — MECLIZINE 12.5 MG: 12.5 TABLET ORAL at 22:01

## 2019-09-27 RX ADMIN — FAMOTIDINE 20 MG: 20 TABLET ORAL at 08:54

## 2019-09-27 RX ADMIN — INSULIN LISPRO 2 UNITS: 100 INJECTION, SOLUTION INTRAVENOUS; SUBCUTANEOUS at 22:01

## 2019-09-27 RX ADMIN — CARVEDILOL 25 MG: 25 TABLET, FILM COATED ORAL at 17:56

## 2019-09-27 RX ADMIN — TRAMADOL HYDROCHLORIDE 50 MG: 50 TABLET ORAL at 16:03

## 2019-09-27 RX ADMIN — APIXABAN 5 MG: 5 TABLET, FILM COATED ORAL at 08:55

## 2019-09-27 RX ADMIN — LEVOTHYROXINE SODIUM 75 MCG: 75 TABLET ORAL at 08:54

## 2019-09-27 RX ADMIN — INSULIN GLARGINE 55 UNITS: 100 INJECTION, SOLUTION SUBCUTANEOUS at 22:01

## 2019-09-27 RX ADMIN — IPRATROPIUM BROMIDE AND ALBUTEROL SULFATE 3 ML: .5; 3 SOLUTION RESPIRATORY (INHALATION) at 14:02

## 2019-09-27 RX ADMIN — Medication 10 ML: at 22:15

## 2019-09-27 RX ADMIN — Medication 10 ML: at 08:54

## 2019-09-27 RX ADMIN — INSULIN LISPRO 2 UNITS: 100 INJECTION, SOLUTION INTRAVENOUS; SUBCUTANEOUS at 18:03

## 2019-09-27 RX ADMIN — TRAMADOL HYDROCHLORIDE 50 MG: 50 TABLET ORAL at 22:14

## 2019-09-27 RX ADMIN — INSULIN LISPRO 2 UNITS: 100 INJECTION, SOLUTION INTRAVENOUS; SUBCUTANEOUS at 12:57

## 2019-09-27 RX ADMIN — MECLIZINE 12.5 MG: 12.5 TABLET ORAL at 13:03

## 2019-09-27 RX ADMIN — BUMETANIDE 2 MG: 0.25 INJECTION INTRAMUSCULAR; INTRAVENOUS at 08:55

## 2019-09-27 RX ADMIN — IPRATROPIUM BROMIDE AND ALBUTEROL SULFATE 3 ML: .5; 3 SOLUTION RESPIRATORY (INHALATION) at 19:33

## 2019-09-27 RX ADMIN — BUMETANIDE 2 MG: 0.25 INJECTION INTRAMUSCULAR; INTRAVENOUS at 22:01

## 2019-09-27 RX ADMIN — CARVEDILOL 25 MG: 25 TABLET, FILM COATED ORAL at 08:54

## 2019-09-27 RX ADMIN — LISINOPRIL 20 MG: 20 TABLET ORAL at 08:54

## 2019-09-27 RX ADMIN — FLUTICASONE PROPIONATE 2 SPRAY: 50 SPRAY, METERED NASAL at 09:45

## 2019-09-27 RX ADMIN — INSULIN LISPRO 2 UNITS: 100 INJECTION, SOLUTION INTRAVENOUS; SUBCUTANEOUS at 08:53

## 2019-09-27 RX ADMIN — ATORVASTATIN CALCIUM 40 MG: 40 TABLET, FILM COATED ORAL at 08:54

## 2019-09-27 RX ADMIN — HYDRALAZINE HYDROCHLORIDE 50 MG: 25 TABLET, FILM COATED ORAL at 22:00

## 2019-09-27 RX ADMIN — IPRATROPIUM BROMIDE AND ALBUTEROL SULFATE 3 ML: .5; 3 SOLUTION RESPIRATORY (INHALATION) at 10:06

## 2019-09-27 RX ADMIN — LISINOPRIL 20 MG: 20 TABLET ORAL at 22:01

## 2019-09-27 RX ADMIN — ASPIRIN 81 MG: 81 TABLET, COATED ORAL at 08:54

## 2019-09-27 ASSESSMENT — PAIN DESCRIPTION - PAIN TYPE
TYPE: CHRONIC PAIN
TYPE: CHRONIC PAIN
TYPE: ACUTE PAIN;CHRONIC PAIN

## 2019-09-27 ASSESSMENT — PAIN DESCRIPTION - LOCATION
LOCATION: ARM;SHOULDER
LOCATION: HEAD;KNEE;HAND

## 2019-09-27 ASSESSMENT — PAIN SCALES - GENERAL
PAINLEVEL_OUTOF10: 7
PAINLEVEL_OUTOF10: 0
PAINLEVEL_OUTOF10: 6
PAINLEVEL_OUTOF10: 9
PAINLEVEL_OUTOF10: 5
PAINLEVEL_OUTOF10: 8
PAINLEVEL_OUTOF10: 5

## 2019-09-27 ASSESSMENT — PAIN DESCRIPTION - ORIENTATION
ORIENTATION: RIGHT;LEFT
ORIENTATION: RIGHT;LEFT;MID
ORIENTATION: RIGHT;LEFT

## 2019-09-27 ASSESSMENT — PAIN DESCRIPTION - DESCRIPTORS
DESCRIPTORS: ACHING
DESCRIPTORS: ACHING;CONSTANT;DISCOMFORT
DESCRIPTORS: ACHING;CONSTANT;DISCOMFORT

## 2019-09-27 ASSESSMENT — PAIN - FUNCTIONAL ASSESSMENT
PAIN_FUNCTIONAL_ASSESSMENT: ACTIVITIES ARE NOT PREVENTED
PAIN_FUNCTIONAL_ASSESSMENT: ACTIVITIES ARE NOT PREVENTED

## 2019-09-27 ASSESSMENT — PAIN DESCRIPTION - ONSET
ONSET: ON-GOING

## 2019-09-27 ASSESSMENT — PAIN DESCRIPTION - FREQUENCY
FREQUENCY: CONTINUOUS
FREQUENCY: CONTINUOUS
FREQUENCY: INTERMITTENT

## 2019-09-27 ASSESSMENT — PAIN DESCRIPTION - PROGRESSION
CLINICAL_PROGRESSION: NOT CHANGED
CLINICAL_PROGRESSION: NOT CHANGED

## 2019-09-28 ENCOUNTER — APPOINTMENT (OUTPATIENT)
Dept: GENERAL RADIOLOGY | Age: 74
DRG: 291 | End: 2019-09-28
Payer: MEDICARE

## 2019-09-28 LAB
ANION GAP SERPL CALCULATED.3IONS-SCNC: 14 MMOL/L (ref 7–16)
BUN BLDV-MCNC: 48 MG/DL (ref 8–23)
CALCIUM SERPL-MCNC: 8.8 MG/DL (ref 8.6–10.2)
CHLORIDE BLD-SCNC: 100 MMOL/L (ref 98–107)
CO2: 27 MMOL/L (ref 22–29)
CREAT SERPL-MCNC: 2.1 MG/DL (ref 0.5–1)
GFR AFRICAN AMERICAN: 28
GFR NON-AFRICAN AMERICAN: 28 ML/MIN/1.73
GLUCOSE BLD-MCNC: 177 MG/DL (ref 74–99)
METER GLUCOSE: 147 MG/DL (ref 74–99)
METER GLUCOSE: 158 MG/DL (ref 74–99)
METER GLUCOSE: 202 MG/DL (ref 74–99)
METER GLUCOSE: 237 MG/DL (ref 74–99)
POTASSIUM SERPL-SCNC: 4.6 MMOL/L (ref 3.5–5)
SODIUM BLD-SCNC: 141 MMOL/L (ref 132–146)

## 2019-09-28 PROCEDURE — 6370000000 HC RX 637 (ALT 250 FOR IP): Performed by: NURSE PRACTITIONER

## 2019-09-28 PROCEDURE — 2500000003 HC RX 250 WO HCPCS: Performed by: INTERNAL MEDICINE

## 2019-09-28 PROCEDURE — 2580000003 HC RX 258: Performed by: INTERNAL MEDICINE

## 2019-09-28 PROCEDURE — 6370000000 HC RX 637 (ALT 250 FOR IP): Performed by: INTERNAL MEDICINE

## 2019-09-28 PROCEDURE — 1200000000 HC SEMI PRIVATE

## 2019-09-28 PROCEDURE — 71045 X-RAY EXAM CHEST 1 VIEW: CPT

## 2019-09-28 PROCEDURE — 82962 GLUCOSE BLOOD TEST: CPT

## 2019-09-28 PROCEDURE — 80048 BASIC METABOLIC PNL TOTAL CA: CPT

## 2019-09-28 PROCEDURE — 94640 AIRWAY INHALATION TREATMENT: CPT

## 2019-09-28 PROCEDURE — 36415 COLL VENOUS BLD VENIPUNCTURE: CPT

## 2019-09-28 RX ORDER — METOLAZONE 2.5 MG/1
2.5 TABLET ORAL DAILY
Status: DISCONTINUED | OUTPATIENT
Start: 2019-09-28 | End: 2019-09-29

## 2019-09-28 RX ORDER — BUMETANIDE 1 MG/1
2 TABLET ORAL 2 TIMES DAILY
Status: DISCONTINUED | OUTPATIENT
Start: 2019-09-28 | End: 2019-09-30 | Stop reason: HOSPADM

## 2019-09-28 RX ADMIN — CARVEDILOL 25 MG: 25 TABLET, FILM COATED ORAL at 08:14

## 2019-09-28 RX ADMIN — ALBUTEROL SULFATE 2 PUFF: 90 AEROSOL, METERED RESPIRATORY (INHALATION) at 21:37

## 2019-09-28 RX ADMIN — IPRATROPIUM BROMIDE AND ALBUTEROL SULFATE 3 ML: .5; 3 SOLUTION RESPIRATORY (INHALATION) at 08:29

## 2019-09-28 RX ADMIN — FAMOTIDINE 20 MG: 20 TABLET ORAL at 08:14

## 2019-09-28 RX ADMIN — MECLIZINE 12.5 MG: 12.5 TABLET ORAL at 16:19

## 2019-09-28 RX ADMIN — MECLIZINE 12.5 MG: 12.5 TABLET ORAL at 11:24

## 2019-09-28 RX ADMIN — INSULIN GLARGINE 55 UNITS: 100 INJECTION, SOLUTION SUBCUTANEOUS at 20:06

## 2019-09-28 RX ADMIN — LISINOPRIL 20 MG: 20 TABLET ORAL at 20:03

## 2019-09-28 RX ADMIN — INSULIN LISPRO 2 UNITS: 100 INJECTION, SOLUTION INTRAVENOUS; SUBCUTANEOUS at 12:40

## 2019-09-28 RX ADMIN — IPRATROPIUM BROMIDE AND ALBUTEROL SULFATE 3 ML: .5; 3 SOLUTION RESPIRATORY (INHALATION) at 16:27

## 2019-09-28 RX ADMIN — ACETAMINOPHEN 650 MG: 325 TABLET, FILM COATED ORAL at 11:23

## 2019-09-28 RX ADMIN — IPRATROPIUM BROMIDE AND ALBUTEROL SULFATE 3 ML: .5; 3 SOLUTION RESPIRATORY (INHALATION) at 12:55

## 2019-09-28 RX ADMIN — TRAMADOL HYDROCHLORIDE 50 MG: 50 TABLET ORAL at 20:02

## 2019-09-28 RX ADMIN — INSULIN LISPRO 4 UNITS: 100 INJECTION, SOLUTION INTRAVENOUS; SUBCUTANEOUS at 08:16

## 2019-09-28 RX ADMIN — INSULIN LISPRO 2 UNITS: 100 INJECTION, SOLUTION INTRAVENOUS; SUBCUTANEOUS at 16:20

## 2019-09-28 RX ADMIN — ATORVASTATIN CALCIUM 40 MG: 40 TABLET, FILM COATED ORAL at 08:14

## 2019-09-28 RX ADMIN — HYDRALAZINE HYDROCHLORIDE 50 MG: 25 TABLET, FILM COATED ORAL at 20:02

## 2019-09-28 RX ADMIN — HYDRALAZINE HYDROCHLORIDE 50 MG: 25 TABLET, FILM COATED ORAL at 14:37

## 2019-09-28 RX ADMIN — LISINOPRIL 20 MG: 20 TABLET ORAL at 08:15

## 2019-09-28 RX ADMIN — Medication 10 ML: at 08:15

## 2019-09-28 RX ADMIN — IPRATROPIUM BROMIDE AND ALBUTEROL SULFATE 3 ML: .5; 3 SOLUTION RESPIRATORY (INHALATION) at 20:12

## 2019-09-28 RX ADMIN — APIXABAN 5 MG: 5 TABLET, FILM COATED ORAL at 20:02

## 2019-09-28 RX ADMIN — APIXABAN 5 MG: 5 TABLET, FILM COATED ORAL at 08:14

## 2019-09-28 RX ADMIN — HYDRALAZINE HYDROCHLORIDE 50 MG: 25 TABLET, FILM COATED ORAL at 08:14

## 2019-09-28 RX ADMIN — CARVEDILOL 25 MG: 25 TABLET, FILM COATED ORAL at 16:19

## 2019-09-28 RX ADMIN — METOLAZONE 2.5 MG: 2.5 TABLET ORAL at 11:23

## 2019-09-28 RX ADMIN — Medication 10 ML: at 20:03

## 2019-09-28 RX ADMIN — LEVOTHYROXINE SODIUM 75 MCG: 75 TABLET ORAL at 08:15

## 2019-09-28 RX ADMIN — BUMETANIDE 2 MG: 1 TABLET ORAL at 16:19

## 2019-09-28 RX ADMIN — BUMETANIDE 2 MG: 0.25 INJECTION INTRAMUSCULAR; INTRAVENOUS at 08:14

## 2019-09-28 RX ADMIN — FLUTICASONE PROPIONATE 2 SPRAY: 50 SPRAY, METERED NASAL at 08:15

## 2019-09-28 RX ADMIN — ASPIRIN 81 MG: 81 TABLET, COATED ORAL at 08:15

## 2019-09-28 RX ADMIN — INSULIN LISPRO 2 UNITS: 100 INJECTION, SOLUTION INTRAVENOUS; SUBCUTANEOUS at 20:08

## 2019-09-28 ASSESSMENT — PAIN SCALES - GENERAL
PAINLEVEL_OUTOF10: 0
PAINLEVEL_OUTOF10: 3
PAINLEVEL_OUTOF10: 0
PAINLEVEL_OUTOF10: 3
PAINLEVEL_OUTOF10: 9

## 2019-09-28 ASSESSMENT — PAIN DESCRIPTION - ONSET: ONSET: ON-GOING

## 2019-09-28 ASSESSMENT — PAIN DESCRIPTION - ORIENTATION: ORIENTATION: LEFT

## 2019-09-28 ASSESSMENT — PAIN DESCRIPTION - FREQUENCY: FREQUENCY: CONTINUOUS

## 2019-09-28 ASSESSMENT — PAIN DESCRIPTION - PROGRESSION: CLINICAL_PROGRESSION: NOT CHANGED

## 2019-09-28 ASSESSMENT — PAIN DESCRIPTION - LOCATION: LOCATION: HEAD;FACE

## 2019-09-28 ASSESSMENT — PAIN DESCRIPTION - PAIN TYPE: TYPE: ACUTE PAIN

## 2019-09-28 ASSESSMENT — PAIN - FUNCTIONAL ASSESSMENT: PAIN_FUNCTIONAL_ASSESSMENT: ACTIVITIES ARE NOT PREVENTED

## 2019-09-28 ASSESSMENT — PAIN DESCRIPTION - DESCRIPTORS: DESCRIPTORS: PRESSURE;DULL;ACHING

## 2019-09-29 LAB
ANION GAP SERPL CALCULATED.3IONS-SCNC: 13 MMOL/L (ref 7–16)
BUN BLDV-MCNC: 62 MG/DL (ref 8–23)
CALCIUM SERPL-MCNC: 8.7 MG/DL (ref 8.6–10.2)
CHLORIDE BLD-SCNC: 99 MMOL/L (ref 98–107)
CO2: 28 MMOL/L (ref 22–29)
CREAT SERPL-MCNC: 2.4 MG/DL (ref 0.5–1)
GFR AFRICAN AMERICAN: 24
GFR NON-AFRICAN AMERICAN: 24 ML/MIN/1.73
GLUCOSE BLD-MCNC: 95 MG/DL (ref 74–99)
METER GLUCOSE: 101 MG/DL (ref 74–99)
METER GLUCOSE: 157 MG/DL (ref 74–99)
METER GLUCOSE: 180 MG/DL (ref 74–99)
METER GLUCOSE: 202 MG/DL (ref 74–99)
POTASSIUM SERPL-SCNC: 4.1 MMOL/L (ref 3.5–5)
SODIUM BLD-SCNC: 140 MMOL/L (ref 132–146)

## 2019-09-29 PROCEDURE — 2580000003 HC RX 258: Performed by: INTERNAL MEDICINE

## 2019-09-29 PROCEDURE — 82962 GLUCOSE BLOOD TEST: CPT

## 2019-09-29 PROCEDURE — 1200000000 HC SEMI PRIVATE

## 2019-09-29 PROCEDURE — 6370000000 HC RX 637 (ALT 250 FOR IP): Performed by: INTERNAL MEDICINE

## 2019-09-29 PROCEDURE — 94640 AIRWAY INHALATION TREATMENT: CPT

## 2019-09-29 PROCEDURE — 80048 BASIC METABOLIC PNL TOTAL CA: CPT

## 2019-09-29 PROCEDURE — 6370000000 HC RX 637 (ALT 250 FOR IP): Performed by: NURSE PRACTITIONER

## 2019-09-29 PROCEDURE — 36415 COLL VENOUS BLD VENIPUNCTURE: CPT

## 2019-09-29 RX ADMIN — INSULIN LISPRO 2 UNITS: 100 INJECTION, SOLUTION INTRAVENOUS; SUBCUTANEOUS at 11:49

## 2019-09-29 RX ADMIN — LISINOPRIL 20 MG: 20 TABLET ORAL at 20:44

## 2019-09-29 RX ADMIN — Medication 10 ML: at 20:43

## 2019-09-29 RX ADMIN — APIXABAN 5 MG: 5 TABLET, FILM COATED ORAL at 08:03

## 2019-09-29 RX ADMIN — IPRATROPIUM BROMIDE AND ALBUTEROL SULFATE 3 ML: .5; 3 SOLUTION RESPIRATORY (INHALATION) at 09:51

## 2019-09-29 RX ADMIN — IPRATROPIUM BROMIDE AND ALBUTEROL SULFATE 3 ML: .5; 3 SOLUTION RESPIRATORY (INHALATION) at 16:10

## 2019-09-29 RX ADMIN — INSULIN LISPRO 2 UNITS: 100 INJECTION, SOLUTION INTRAVENOUS; SUBCUTANEOUS at 16:31

## 2019-09-29 RX ADMIN — LISINOPRIL 20 MG: 20 TABLET ORAL at 08:03

## 2019-09-29 RX ADMIN — ASPIRIN 81 MG: 81 TABLET, COATED ORAL at 08:03

## 2019-09-29 RX ADMIN — TRAMADOL HYDROCHLORIDE 50 MG: 50 TABLET ORAL at 01:53

## 2019-09-29 RX ADMIN — MECLIZINE 12.5 MG: 12.5 TABLET ORAL at 07:08

## 2019-09-29 RX ADMIN — Medication 10 ML: at 08:04

## 2019-09-29 RX ADMIN — HYDRALAZINE HYDROCHLORIDE 50 MG: 25 TABLET, FILM COATED ORAL at 08:03

## 2019-09-29 RX ADMIN — APIXABAN 5 MG: 5 TABLET, FILM COATED ORAL at 20:43

## 2019-09-29 RX ADMIN — FAMOTIDINE 20 MG: 20 TABLET ORAL at 08:03

## 2019-09-29 RX ADMIN — LEVOTHYROXINE SODIUM 75 MCG: 75 TABLET ORAL at 08:03

## 2019-09-29 RX ADMIN — CARVEDILOL 25 MG: 25 TABLET, FILM COATED ORAL at 08:03

## 2019-09-29 RX ADMIN — INSULIN GLARGINE 55 UNITS: 100 INJECTION, SOLUTION SUBCUTANEOUS at 20:45

## 2019-09-29 RX ADMIN — CARVEDILOL 25 MG: 25 TABLET, FILM COATED ORAL at 16:31

## 2019-09-29 RX ADMIN — HYDRALAZINE HYDROCHLORIDE 50 MG: 25 TABLET, FILM COATED ORAL at 20:44

## 2019-09-29 RX ADMIN — TRAMADOL HYDROCHLORIDE 50 MG: 50 TABLET ORAL at 20:49

## 2019-09-29 RX ADMIN — IPRATROPIUM BROMIDE AND ALBUTEROL SULFATE 3 ML: .5; 3 SOLUTION RESPIRATORY (INHALATION) at 19:46

## 2019-09-29 RX ADMIN — METOLAZONE 2.5 MG: 2.5 TABLET ORAL at 08:03

## 2019-09-29 RX ADMIN — BUMETANIDE 2 MG: 1 TABLET ORAL at 08:03

## 2019-09-29 RX ADMIN — ACETAMINOPHEN 650 MG: 325 TABLET, FILM COATED ORAL at 11:49

## 2019-09-29 RX ADMIN — FLUTICASONE PROPIONATE 2 SPRAY: 50 SPRAY, METERED NASAL at 08:04

## 2019-09-29 RX ADMIN — HYDRALAZINE HYDROCHLORIDE 50 MG: 25 TABLET, FILM COATED ORAL at 14:18

## 2019-09-29 RX ADMIN — ATORVASTATIN CALCIUM 40 MG: 40 TABLET, FILM COATED ORAL at 08:04

## 2019-09-29 RX ADMIN — INSULIN LISPRO 2 UNITS: 100 INJECTION, SOLUTION INTRAVENOUS; SUBCUTANEOUS at 20:48

## 2019-09-29 RX ADMIN — BUMETANIDE 2 MG: 1 TABLET ORAL at 16:31

## 2019-09-29 RX ADMIN — IPRATROPIUM BROMIDE AND ALBUTEROL SULFATE 3 ML: .5; 3 SOLUTION RESPIRATORY (INHALATION) at 12:51

## 2019-09-29 ASSESSMENT — PAIN SCALES - GENERAL
PAINLEVEL_OUTOF10: 1
PAINLEVEL_OUTOF10: 7
PAINLEVEL_OUTOF10: 3
PAINLEVEL_OUTOF10: 0
PAINLEVEL_OUTOF10: 5
PAINLEVEL_OUTOF10: 8
PAINLEVEL_OUTOF10: 7

## 2019-09-29 ASSESSMENT — PAIN DESCRIPTION - ORIENTATION: ORIENTATION: LEFT

## 2019-09-29 ASSESSMENT — PAIN DESCRIPTION - PAIN TYPE: TYPE: ACUTE PAIN

## 2019-09-29 ASSESSMENT — PAIN DESCRIPTION - ONSET: ONSET: ON-GOING

## 2019-09-29 ASSESSMENT — PAIN DESCRIPTION - FREQUENCY: FREQUENCY: CONTINUOUS

## 2019-09-29 ASSESSMENT — PAIN - FUNCTIONAL ASSESSMENT: PAIN_FUNCTIONAL_ASSESSMENT: ACTIVITIES ARE NOT PREVENTED

## 2019-09-29 ASSESSMENT — PAIN DESCRIPTION - DESCRIPTORS: DESCRIPTORS: PRESSURE;DISCOMFORT

## 2019-09-29 ASSESSMENT — PAIN DESCRIPTION - LOCATION
LOCATION: HAND;KNEE
LOCATION: EYE;NOSE

## 2019-09-29 ASSESSMENT — PAIN DESCRIPTION - PROGRESSION: CLINICAL_PROGRESSION: NOT CHANGED

## 2019-09-30 ENCOUNTER — TELEPHONE (OUTPATIENT)
Dept: CARDIOLOGY CLINIC | Age: 74
End: 2019-09-30

## 2019-09-30 VITALS
WEIGHT: 245.9 LBS | BODY MASS INDEX: 41.98 KG/M2 | RESPIRATION RATE: 20 BRPM | TEMPERATURE: 97.8 F | SYSTOLIC BLOOD PRESSURE: 110 MMHG | HEART RATE: 74 BPM | HEIGHT: 64 IN | DIASTOLIC BLOOD PRESSURE: 56 MMHG | OXYGEN SATURATION: 94 %

## 2019-09-30 LAB
ANION GAP SERPL CALCULATED.3IONS-SCNC: 17 MMOL/L (ref 7–16)
BUN BLDV-MCNC: 69 MG/DL (ref 8–23)
CALCIUM SERPL-MCNC: 8.9 MG/DL (ref 8.6–10.2)
CHLORIDE BLD-SCNC: 97 MMOL/L (ref 98–107)
CO2: 26 MMOL/L (ref 22–29)
CREAT SERPL-MCNC: 2.1 MG/DL (ref 0.5–1)
GFR AFRICAN AMERICAN: 28
GFR NON-AFRICAN AMERICAN: 28 ML/MIN/1.73
GLUCOSE BLD-MCNC: 83 MG/DL (ref 74–99)
METER GLUCOSE: 126 MG/DL (ref 74–99)
METER GLUCOSE: 97 MG/DL (ref 74–99)
POTASSIUM SERPL-SCNC: 3.6 MMOL/L (ref 3.5–5)
SODIUM BLD-SCNC: 140 MMOL/L (ref 132–146)

## 2019-09-30 PROCEDURE — 6370000000 HC RX 637 (ALT 250 FOR IP): Performed by: INTERNAL MEDICINE

## 2019-09-30 PROCEDURE — 82962 GLUCOSE BLOOD TEST: CPT

## 2019-09-30 PROCEDURE — 94640 AIRWAY INHALATION TREATMENT: CPT

## 2019-09-30 PROCEDURE — 6370000000 HC RX 637 (ALT 250 FOR IP): Performed by: NURSE PRACTITIONER

## 2019-09-30 PROCEDURE — 36415 COLL VENOUS BLD VENIPUNCTURE: CPT

## 2019-09-30 PROCEDURE — 99233 SBSQ HOSP IP/OBS HIGH 50: CPT | Performed by: INTERNAL MEDICINE

## 2019-09-30 PROCEDURE — 2580000003 HC RX 258: Performed by: INTERNAL MEDICINE

## 2019-09-30 PROCEDURE — 80048 BASIC METABOLIC PNL TOTAL CA: CPT

## 2019-09-30 RX ORDER — IPRATROPIUM BROMIDE AND ALBUTEROL SULFATE 2.5; .5 MG/3ML; MG/3ML
1 SOLUTION RESPIRATORY (INHALATION)
Qty: 360 ML | Refills: 0
Start: 2019-09-30

## 2019-09-30 RX ORDER — ACETAMINOPHEN 325 MG/1
650 TABLET ORAL EVERY 4 HOURS PRN
Qty: 120 TABLET | Refills: 3 | COMMUNITY
Start: 2019-09-30

## 2019-09-30 RX ORDER — MECLIZINE HCL 12.5 MG/1
12.5 TABLET ORAL 3 TIMES DAILY PRN
Qty: 30 TABLET | Refills: 0
Start: 2019-09-30 | End: 2019-10-10

## 2019-09-30 RX ADMIN — HYDRALAZINE HYDROCHLORIDE 50 MG: 25 TABLET, FILM COATED ORAL at 09:19

## 2019-09-30 RX ADMIN — LISINOPRIL 20 MG: 20 TABLET ORAL at 09:18

## 2019-09-30 RX ADMIN — FAMOTIDINE 20 MG: 20 TABLET ORAL at 09:18

## 2019-09-30 RX ADMIN — TRAMADOL HYDROCHLORIDE 50 MG: 50 TABLET ORAL at 12:43

## 2019-09-30 RX ADMIN — ASPIRIN 81 MG: 81 TABLET, COATED ORAL at 09:18

## 2019-09-30 RX ADMIN — APIXABAN 5 MG: 5 TABLET, FILM COATED ORAL at 09:18

## 2019-09-30 RX ADMIN — MECLIZINE 12.5 MG: 12.5 TABLET ORAL at 11:27

## 2019-09-30 RX ADMIN — FLUTICASONE PROPIONATE 2 SPRAY: 50 SPRAY, METERED NASAL at 09:18

## 2019-09-30 RX ADMIN — IPRATROPIUM BROMIDE AND ALBUTEROL SULFATE 3 ML: .5; 3 SOLUTION RESPIRATORY (INHALATION) at 13:26

## 2019-09-30 RX ADMIN — HYDRALAZINE HYDROCHLORIDE 50 MG: 25 TABLET, FILM COATED ORAL at 13:51

## 2019-09-30 RX ADMIN — CARVEDILOL 25 MG: 25 TABLET, FILM COATED ORAL at 09:18

## 2019-09-30 RX ADMIN — Medication 10 ML: at 09:20

## 2019-09-30 RX ADMIN — IPRATROPIUM BROMIDE AND ALBUTEROL SULFATE 3 ML: .5; 3 SOLUTION RESPIRATORY (INHALATION) at 08:53

## 2019-09-30 RX ADMIN — LEVOTHYROXINE SODIUM 75 MCG: 75 TABLET ORAL at 09:18

## 2019-09-30 RX ADMIN — ATORVASTATIN CALCIUM 40 MG: 40 TABLET, FILM COATED ORAL at 09:18

## 2019-09-30 RX ADMIN — BUMETANIDE 2 MG: 1 TABLET ORAL at 09:18

## 2019-09-30 ASSESSMENT — PAIN SCALES - GENERAL
PAINLEVEL_OUTOF10: 8
PAINLEVEL_OUTOF10: 0
PAINLEVEL_OUTOF10: 0

## 2019-09-30 ASSESSMENT — PAIN DESCRIPTION - PAIN TYPE: TYPE: CHRONIC PAIN

## 2019-09-30 ASSESSMENT — PAIN DESCRIPTION - DESCRIPTORS: DESCRIPTORS: ACHING;CRAMPING;DISCOMFORT

## 2019-09-30 ASSESSMENT — PAIN DESCRIPTION - ONSET: ONSET: ON-GOING

## 2019-09-30 ASSESSMENT — PAIN DESCRIPTION - LOCATION: LOCATION: ARM;KNEE

## 2019-09-30 ASSESSMENT — PAIN DESCRIPTION - ORIENTATION: ORIENTATION: RIGHT;LEFT

## 2019-09-30 ASSESSMENT — PAIN DESCRIPTION - FREQUENCY: FREQUENCY: CONTINUOUS

## 2019-10-01 ENCOUNTER — CARE COORDINATION (OUTPATIENT)
Dept: CARE COORDINATION | Age: 74
End: 2019-10-01

## 2019-10-03 ENCOUNTER — TELEPHONE (OUTPATIENT)
Dept: ENDOCRINOLOGY | Age: 74
End: 2019-10-03

## 2019-10-03 DIAGNOSIS — E11.21 TYPE 2 DIABETES MELLITUS WITH DIABETIC NEPHROPATHY, WITH LONG-TERM CURRENT USE OF INSULIN (HCC): Primary | ICD-10-CM

## 2019-10-03 DIAGNOSIS — Z79.4 TYPE 2 DIABETES MELLITUS WITH DIABETIC NEPHROPATHY, WITH LONG-TERM CURRENT USE OF INSULIN (HCC): Primary | ICD-10-CM

## 2019-10-08 ENCOUNTER — HOSPITAL ENCOUNTER (OUTPATIENT)
Dept: OTHER | Age: 74
Setting detail: THERAPIES SERIES
Discharge: HOME | End: 2019-10-08
Payer: MEDICARE

## 2019-10-09 ENCOUNTER — OFFICE VISIT (OUTPATIENT)
Dept: CARDIOLOGY CLINIC | Age: 74
End: 2019-10-09
Payer: MEDICARE

## 2019-10-09 ENCOUNTER — TELEPHONE (OUTPATIENT)
Dept: CARDIOLOGY CLINIC | Age: 74
End: 2019-10-09

## 2019-10-09 VITALS
BODY MASS INDEX: 40.46 KG/M2 | HEIGHT: 64 IN | HEART RATE: 74 BPM | DIASTOLIC BLOOD PRESSURE: 60 MMHG | WEIGHT: 237 LBS | OXYGEN SATURATION: 96 % | SYSTOLIC BLOOD PRESSURE: 120 MMHG

## 2019-10-09 DIAGNOSIS — M15.9 GENERALIZED OA: ICD-10-CM

## 2019-10-09 DIAGNOSIS — E11.22 TYPE 2 DIABETES MELLITUS WITH STAGE 3 CHRONIC KIDNEY DISEASE, WITH LONG-TERM CURRENT USE OF INSULIN (HCC): ICD-10-CM

## 2019-10-09 DIAGNOSIS — I38 VALVULAR HEART DISEASE: ICD-10-CM

## 2019-10-09 DIAGNOSIS — G47.33 OBSTRUCTIVE SLEEP APNEA: ICD-10-CM

## 2019-10-09 DIAGNOSIS — E66.01 MORBID OBESITY (HCC): ICD-10-CM

## 2019-10-09 DIAGNOSIS — Z79.4 TYPE 2 DIABETES MELLITUS WITH STAGE 3 CHRONIC KIDNEY DISEASE, WITH LONG-TERM CURRENT USE OF INSULIN (HCC): ICD-10-CM

## 2019-10-09 DIAGNOSIS — E03.9 HYPOTHYROIDISM, UNSPECIFIED TYPE: ICD-10-CM

## 2019-10-09 DIAGNOSIS — Z95.1 HX OF CABG: ICD-10-CM

## 2019-10-09 DIAGNOSIS — I25.10 CAD IN NATIVE ARTERY: ICD-10-CM

## 2019-10-09 DIAGNOSIS — N18.30 TYPE 2 DIABETES MELLITUS WITH STAGE 3 CHRONIC KIDNEY DISEASE, WITH LONG-TERM CURRENT USE OF INSULIN (HCC): ICD-10-CM

## 2019-10-09 DIAGNOSIS — I50.32 CHRONIC HEART FAILURE WITH PRESERVED EJECTION FRACTION (HCC): Primary | ICD-10-CM

## 2019-10-09 DIAGNOSIS — I48.21 PERMANENT ATRIAL FIBRILLATION (HCC): ICD-10-CM

## 2019-10-09 DIAGNOSIS — I27.20 PULMONARY HYPERTENSION (HCC): ICD-10-CM

## 2019-10-09 DIAGNOSIS — I34.0 NONRHEUMATIC MITRAL VALVE REGURGITATION: ICD-10-CM

## 2019-10-09 DIAGNOSIS — J44.9 CHRONIC OBSTRUCTIVE PULMONARY DISEASE, UNSPECIFIED COPD TYPE (HCC): ICD-10-CM

## 2019-10-09 DIAGNOSIS — N18.30 CHRONIC KIDNEY DISEASE, STAGE III (MODERATE) (HCC): ICD-10-CM

## 2019-10-09 PROCEDURE — 1036F TOBACCO NON-USER: CPT | Performed by: NURSE PRACTITIONER

## 2019-10-09 PROCEDURE — 99214 OFFICE O/P EST MOD 30 MIN: CPT | Performed by: NURSE PRACTITIONER

## 2019-10-09 PROCEDURE — 3023F SPIROM DOC REV: CPT | Performed by: NURSE PRACTITIONER

## 2019-10-09 PROCEDURE — 1123F ACP DISCUSS/DSCN MKR DOCD: CPT | Performed by: NURSE PRACTITIONER

## 2019-10-09 PROCEDURE — 4040F PNEUMOC VAC/ADMIN/RCVD: CPT | Performed by: NURSE PRACTITIONER

## 2019-10-09 PROCEDURE — 1111F DSCHRG MED/CURRENT MED MERGE: CPT | Performed by: NURSE PRACTITIONER

## 2019-10-09 PROCEDURE — 1090F PRES/ABSN URINE INCON ASSESS: CPT | Performed by: NURSE PRACTITIONER

## 2019-10-09 PROCEDURE — G8482 FLU IMMUNIZE ORDER/ADMIN: HCPCS | Performed by: NURSE PRACTITIONER

## 2019-10-09 PROCEDURE — G8926 SPIRO NO PERF OR DOC: HCPCS | Performed by: NURSE PRACTITIONER

## 2019-10-09 PROCEDURE — G8417 CALC BMI ABV UP PARAM F/U: HCPCS | Performed by: NURSE PRACTITIONER

## 2019-10-09 PROCEDURE — 93000 ELECTROCARDIOGRAM COMPLETE: CPT | Performed by: INTERNAL MEDICINE

## 2019-10-09 PROCEDURE — G8427 DOCREV CUR MEDS BY ELIG CLIN: HCPCS | Performed by: NURSE PRACTITIONER

## 2019-10-09 PROCEDURE — G8399 PT W/DXA RESULTS DOCUMENT: HCPCS | Performed by: NURSE PRACTITIONER

## 2019-10-09 PROCEDURE — 2022F DILAT RTA XM EVC RTNOPTHY: CPT | Performed by: NURSE PRACTITIONER

## 2019-10-09 PROCEDURE — G8598 ASA/ANTIPLAT THER USED: HCPCS | Performed by: NURSE PRACTITIONER

## 2019-10-09 PROCEDURE — 3017F COLORECTAL CA SCREEN DOC REV: CPT | Performed by: NURSE PRACTITIONER

## 2019-10-09 RX ORDER — TRAMADOL HYDROCHLORIDE 50 MG/1
50 TABLET ORAL EVERY 6 HOURS PRN
COMMUNITY
End: 2019-10-25 | Stop reason: SDUPTHER

## 2019-10-10 ENCOUNTER — CARE COORDINATION (OUTPATIENT)
Dept: CARE COORDINATION | Age: 74
End: 2019-10-10

## 2019-10-17 ENCOUNTER — CARE COORDINATION (OUTPATIENT)
Dept: CARE COORDINATION | Age: 74
End: 2019-10-17

## 2019-10-18 ENCOUNTER — HOSPITAL ENCOUNTER (OUTPATIENT)
Dept: OTHER | Age: 74
Setting detail: THERAPIES SERIES
Discharge: HOME | End: 2019-10-18
Payer: MEDICARE

## 2019-10-18 VITALS
RESPIRATION RATE: 16 BRPM | HEART RATE: 72 BPM | DIASTOLIC BLOOD PRESSURE: 92 MMHG | SYSTOLIC BLOOD PRESSURE: 170 MMHG | WEIGHT: 239.7 LBS | BODY MASS INDEX: 41.79 KG/M2

## 2019-10-18 LAB
ANION GAP SERPL CALCULATED.3IONS-SCNC: 14 MMOL/L (ref 7–16)
BUN BLDV-MCNC: 48 MG/DL (ref 8–23)
CALCIUM SERPL-MCNC: 8.9 MG/DL (ref 8.6–10.2)
CHLORIDE BLD-SCNC: 96 MMOL/L (ref 98–107)
CO2: 28 MMOL/L (ref 22–29)
CREAT SERPL-MCNC: 2.4 MG/DL (ref 0.5–1)
GFR AFRICAN AMERICAN: 24
GFR NON-AFRICAN AMERICAN: 24 ML/MIN/1.73
GLUCOSE BLD-MCNC: 158 MG/DL (ref 74–99)
POTASSIUM SERPL-SCNC: 4.3 MMOL/L (ref 3.5–5)
PRO-BNP: 4664 PG/ML (ref 0–450)
SODIUM BLD-SCNC: 138 MMOL/L (ref 132–146)

## 2019-10-18 PROCEDURE — 83880 ASSAY OF NATRIURETIC PEPTIDE: CPT

## 2019-10-18 PROCEDURE — 99204 OFFICE O/P NEW MOD 45 MIN: CPT

## 2019-10-18 PROCEDURE — 36415 COLL VENOUS BLD VENIPUNCTURE: CPT

## 2019-10-18 PROCEDURE — 80048 BASIC METABOLIC PNL TOTAL CA: CPT

## 2019-10-21 ENCOUNTER — TELEPHONE (OUTPATIENT)
Dept: PRIMARY CARE CLINIC | Age: 74
End: 2019-10-21

## 2019-10-21 DIAGNOSIS — I50.9 CONGESTIVE HEART FAILURE, NYHA CLASS 3 AND ACC/AHA STAGE C (HCC): Primary | ICD-10-CM

## 2019-10-22 ENCOUNTER — OFFICE VISIT (OUTPATIENT)
Dept: CARDIOLOGY CLINIC | Age: 74
End: 2019-10-22
Payer: MEDICARE

## 2019-10-22 ENCOUNTER — CARE COORDINATION (OUTPATIENT)
Dept: CARE COORDINATION | Age: 74
End: 2019-10-22

## 2019-10-22 VITALS
HEART RATE: 94 BPM | RESPIRATION RATE: 24 BRPM | WEIGHT: 240 LBS | DIASTOLIC BLOOD PRESSURE: 60 MMHG | SYSTOLIC BLOOD PRESSURE: 132 MMHG | HEIGHT: 64 IN | BODY MASS INDEX: 40.97 KG/M2 | OXYGEN SATURATION: 92 %

## 2019-10-22 DIAGNOSIS — I50.9 CONGESTIVE HEART FAILURE, NYHA CLASS 3 AND ACC/AHA STAGE C (HCC): Primary | ICD-10-CM

## 2019-10-22 DIAGNOSIS — I25.10 CORONARY ARTERY DISEASE INVOLVING NATIVE CORONARY ARTERY OF NATIVE HEART WITHOUT ANGINA PECTORIS: Chronic | ICD-10-CM

## 2019-10-22 DIAGNOSIS — I50.32 CHRONIC HEART FAILURE WITH PRESERVED EJECTION FRACTION (HFPEF) (HCC): ICD-10-CM

## 2019-10-22 DIAGNOSIS — G47.33 OBSTRUCTIVE SLEEP APNEA: ICD-10-CM

## 2019-10-22 DIAGNOSIS — I34.0 NONRHEUMATIC MITRAL VALVE REGURGITATION: Primary | ICD-10-CM

## 2019-10-22 PROBLEM — M15.9 PRIMARY OSTEOARTHRITIS INVOLVING MULTIPLE JOINTS: Status: RESOLVED | Noted: 2017-05-11 | Resolved: 2019-10-22

## 2019-10-22 PROBLEM — R07.9 CHEST PAIN: Status: RESOLVED | Noted: 2019-09-18 | Resolved: 2019-10-22

## 2019-10-22 PROBLEM — R80.9 ALBUMINURIA: Status: RESOLVED | Noted: 2017-02-14 | Resolved: 2019-10-22

## 2019-10-22 PROBLEM — R42 DIZZINESS: Status: RESOLVED | Noted: 2017-08-22 | Resolved: 2019-10-22

## 2019-10-22 PROBLEM — I51.7 LEFT ATRIAL DILATION: Status: RESOLVED | Noted: 2017-02-14 | Resolved: 2019-10-22

## 2019-10-22 PROBLEM — R15.9 FECAL INCONTINENCE: Status: RESOLVED | Noted: 2017-05-09 | Resolved: 2019-10-22

## 2019-10-22 PROBLEM — I50.33 ACUTE ON CHRONIC DIASTOLIC CHF (CONGESTIVE HEART FAILURE) (HCC): Status: RESOLVED | Noted: 2019-09-25 | Resolved: 2019-10-22

## 2019-10-22 PROBLEM — M17.0 PRIMARY OSTEOARTHRITIS OF BOTH KNEES: Status: RESOLVED | Noted: 2017-03-03 | Resolved: 2019-10-22

## 2019-10-22 PROBLEM — Z87.09 HISTORY OF ASTHMA: Status: RESOLVED | Noted: 2017-08-22 | Resolved: 2019-10-22

## 2019-10-22 PROBLEM — N39.46 MIXED STRESS AND URGE URINARY INCONTINENCE: Status: RESOLVED | Noted: 2019-07-08 | Resolved: 2019-10-22

## 2019-10-22 PROBLEM — R06.09 DYSPNEA ON EXERTION: Status: RESOLVED | Noted: 2018-02-06 | Resolved: 2019-10-22

## 2019-10-22 PROCEDURE — 99215 OFFICE O/P EST HI 40 MIN: CPT | Performed by: INTERNAL MEDICINE

## 2019-10-22 PROCEDURE — 4040F PNEUMOC VAC/ADMIN/RCVD: CPT | Performed by: INTERNAL MEDICINE

## 2019-10-22 PROCEDURE — G8427 DOCREV CUR MEDS BY ELIG CLIN: HCPCS | Performed by: INTERNAL MEDICINE

## 2019-10-22 PROCEDURE — 1123F ACP DISCUSS/DSCN MKR DOCD: CPT | Performed by: INTERNAL MEDICINE

## 2019-10-22 PROCEDURE — G8417 CALC BMI ABV UP PARAM F/U: HCPCS | Performed by: INTERNAL MEDICINE

## 2019-10-22 PROCEDURE — G8598 ASA/ANTIPLAT THER USED: HCPCS | Performed by: INTERNAL MEDICINE

## 2019-10-22 PROCEDURE — 1111F DSCHRG MED/CURRENT MED MERGE: CPT | Performed by: INTERNAL MEDICINE

## 2019-10-22 PROCEDURE — G8484 FLU IMMUNIZE NO ADMIN: HCPCS | Performed by: INTERNAL MEDICINE

## 2019-10-22 PROCEDURE — 3017F COLORECTAL CA SCREEN DOC REV: CPT | Performed by: INTERNAL MEDICINE

## 2019-10-22 PROCEDURE — G8399 PT W/DXA RESULTS DOCUMENT: HCPCS | Performed by: INTERNAL MEDICINE

## 2019-10-22 PROCEDURE — 1090F PRES/ABSN URINE INCON ASSESS: CPT | Performed by: INTERNAL MEDICINE

## 2019-10-22 PROCEDURE — 1036F TOBACCO NON-USER: CPT | Performed by: INTERNAL MEDICINE

## 2019-10-22 RX ORDER — MECLIZINE HCL 12.5 MG/1
12.5 TABLET ORAL 3 TIMES DAILY
COMMUNITY
End: 2019-12-16 | Stop reason: SDUPTHER

## 2019-10-22 RX ORDER — METOLAZONE 2.5 MG/1
2.5 TABLET ORAL PRN
Qty: 30 TABLET | Refills: 3 | Status: SHIPPED | OUTPATIENT
Start: 2019-10-22 | End: 2019-12-03

## 2019-10-23 ENCOUNTER — TELEPHONE (OUTPATIENT)
Dept: ADMINISTRATIVE | Age: 74
End: 2019-10-23

## 2019-10-25 ENCOUNTER — OFFICE VISIT (OUTPATIENT)
Dept: PRIMARY CARE CLINIC | Age: 74
End: 2019-10-25
Payer: MEDICARE

## 2019-10-25 VITALS
RESPIRATION RATE: 16 BRPM | DIASTOLIC BLOOD PRESSURE: 72 MMHG | SYSTOLIC BLOOD PRESSURE: 134 MMHG | WEIGHT: 240 LBS | HEIGHT: 64 IN | BODY MASS INDEX: 40.97 KG/M2 | HEART RATE: 72 BPM | OXYGEN SATURATION: 93 %

## 2019-10-25 DIAGNOSIS — M15.9 PRIMARY OSTEOARTHRITIS INVOLVING MULTIPLE JOINTS: ICD-10-CM

## 2019-10-25 DIAGNOSIS — Z23 NEED FOR PROPHYLACTIC VACCINATION AND INOCULATION AGAINST INFLUENZA: ICD-10-CM

## 2019-10-25 DIAGNOSIS — I50.32 CHRONIC HEART FAILURE WITH PRESERVED EJECTION FRACTION (HFPEF) (HCC): ICD-10-CM

## 2019-10-25 DIAGNOSIS — I10 ESSENTIAL HYPERTENSION: Chronic | ICD-10-CM

## 2019-10-25 DIAGNOSIS — G62.9 PERIPHERAL POLYNEUROPATHY: Primary | ICD-10-CM

## 2019-10-25 PROCEDURE — G0008 ADMIN INFLUENZA VIRUS VAC: HCPCS | Performed by: FAMILY MEDICINE

## 2019-10-25 PROCEDURE — 99495 TRANSJ CARE MGMT MOD F2F 14D: CPT | Performed by: FAMILY MEDICINE

## 2019-10-25 PROCEDURE — 1111F DSCHRG MED/CURRENT MED MERGE: CPT | Performed by: FAMILY MEDICINE

## 2019-10-25 PROCEDURE — 90653 IIV ADJUVANT VACCINE IM: CPT | Performed by: FAMILY MEDICINE

## 2019-10-25 RX ORDER — TRAMADOL HYDROCHLORIDE 50 MG/1
50 TABLET ORAL EVERY 6 HOURS PRN
Qty: 120 TABLET | Refills: 1 | Status: SHIPPED | OUTPATIENT
Start: 2019-10-25 | End: 2019-11-24

## 2019-10-29 ENCOUNTER — OFFICE VISIT (OUTPATIENT)
Dept: ORTHOPEDIC SURGERY | Age: 74
End: 2019-10-29
Payer: MEDICARE

## 2019-10-29 VITALS
DIASTOLIC BLOOD PRESSURE: 84 MMHG | SYSTOLIC BLOOD PRESSURE: 134 MMHG | WEIGHT: 238 LBS | BODY MASS INDEX: 40.63 KG/M2 | HEART RATE: 76 BPM | HEIGHT: 64 IN

## 2019-10-29 DIAGNOSIS — Z79.01 CHRONIC ANTICOAGULATION: ICD-10-CM

## 2019-10-29 DIAGNOSIS — N18.30 CHRONIC KIDNEY DISEASE, STAGE III (MODERATE) (HCC): ICD-10-CM

## 2019-10-29 DIAGNOSIS — I48.21 PERMANENT ATRIAL FIBRILLATION (HCC): ICD-10-CM

## 2019-10-29 DIAGNOSIS — F41.9 ANXIETY: ICD-10-CM

## 2019-10-29 DIAGNOSIS — N18.30 TYPE 2 DIABETES MELLITUS WITH STAGE 3 CHRONIC KIDNEY DISEASE, WITH LONG-TERM CURRENT USE OF INSULIN (HCC): ICD-10-CM

## 2019-10-29 DIAGNOSIS — M17.0 PRIMARY OSTEOARTHRITIS OF BOTH KNEES: Primary | ICD-10-CM

## 2019-10-29 DIAGNOSIS — J44.9 CHRONIC OBSTRUCTIVE PULMONARY DISEASE, UNSPECIFIED COPD TYPE (HCC): ICD-10-CM

## 2019-10-29 DIAGNOSIS — E11.22 TYPE 2 DIABETES MELLITUS WITH STAGE 3 CHRONIC KIDNEY DISEASE, WITH LONG-TERM CURRENT USE OF INSULIN (HCC): ICD-10-CM

## 2019-10-29 DIAGNOSIS — E66.01 MORBID OBESITY (HCC): ICD-10-CM

## 2019-10-29 DIAGNOSIS — Z79.4 TYPE 2 DIABETES MELLITUS WITH STAGE 3 CHRONIC KIDNEY DISEASE, WITH LONG-TERM CURRENT USE OF INSULIN (HCC): ICD-10-CM

## 2019-10-29 DIAGNOSIS — I50.32 CHRONIC HEART FAILURE WITH PRESERVED EJECTION FRACTION (HFPEF) (HCC): ICD-10-CM

## 2019-10-29 DIAGNOSIS — I10 ESSENTIAL HYPERTENSION: ICD-10-CM

## 2019-10-29 DIAGNOSIS — I34.0 MITRAL VALVE INSUFFICIENCY, UNSPECIFIED ETIOLOGY: ICD-10-CM

## 2019-10-29 DIAGNOSIS — I51.7 LVH (LEFT VENTRICULAR HYPERTROPHY): ICD-10-CM

## 2019-10-29 PROCEDURE — 99213 OFFICE O/P EST LOW 20 MIN: CPT | Performed by: ORTHOPAEDIC SURGERY

## 2019-10-29 PROCEDURE — G8417 CALC BMI ABV UP PARAM F/U: HCPCS | Performed by: ORTHOPAEDIC SURGERY

## 2019-10-29 PROCEDURE — G8482 FLU IMMUNIZE ORDER/ADMIN: HCPCS | Performed by: ORTHOPAEDIC SURGERY

## 2019-10-29 PROCEDURE — 1036F TOBACCO NON-USER: CPT | Performed by: ORTHOPAEDIC SURGERY

## 2019-10-29 PROCEDURE — G8399 PT W/DXA RESULTS DOCUMENT: HCPCS | Performed by: ORTHOPAEDIC SURGERY

## 2019-10-29 PROCEDURE — 2022F DILAT RTA XM EVC RTNOPTHY: CPT | Performed by: ORTHOPAEDIC SURGERY

## 2019-10-29 PROCEDURE — 1123F ACP DISCUSS/DSCN MKR DOCD: CPT | Performed by: ORTHOPAEDIC SURGERY

## 2019-10-29 PROCEDURE — G8926 SPIRO NO PERF OR DOC: HCPCS | Performed by: ORTHOPAEDIC SURGERY

## 2019-10-29 PROCEDURE — G8427 DOCREV CUR MEDS BY ELIG CLIN: HCPCS | Performed by: ORTHOPAEDIC SURGERY

## 2019-10-29 PROCEDURE — 4040F PNEUMOC VAC/ADMIN/RCVD: CPT | Performed by: ORTHOPAEDIC SURGERY

## 2019-10-29 PROCEDURE — 1111F DSCHRG MED/CURRENT MED MERGE: CPT | Performed by: ORTHOPAEDIC SURGERY

## 2019-10-29 PROCEDURE — G8598 ASA/ANTIPLAT THER USED: HCPCS | Performed by: ORTHOPAEDIC SURGERY

## 2019-10-29 PROCEDURE — 3017F COLORECTAL CA SCREEN DOC REV: CPT | Performed by: ORTHOPAEDIC SURGERY

## 2019-10-29 PROCEDURE — 1090F PRES/ABSN URINE INCON ASSESS: CPT | Performed by: ORTHOPAEDIC SURGERY

## 2019-10-29 PROCEDURE — 20610 DRAIN/INJ JOINT/BURSA W/O US: CPT | Performed by: ORTHOPAEDIC SURGERY

## 2019-10-29 PROCEDURE — 3023F SPIROM DOC REV: CPT | Performed by: ORTHOPAEDIC SURGERY

## 2019-10-29 RX ORDER — TRIAMCINOLONE ACETONIDE 40 MG/ML
80 INJECTION, SUSPENSION INTRA-ARTICULAR; INTRAMUSCULAR ONCE
Status: COMPLETED | OUTPATIENT
Start: 2019-10-29 | End: 2019-10-29

## 2019-10-29 RX ORDER — BUPIVACAINE HYDROCHLORIDE 2.5 MG/ML
3 INJECTION, SOLUTION INFILTRATION; PERINEURAL ONCE
Status: COMPLETED | OUTPATIENT
Start: 2019-10-29 | End: 2019-10-29

## 2019-10-29 RX ADMIN — TRIAMCINOLONE ACETONIDE 80 MG: 40 INJECTION, SUSPENSION INTRA-ARTICULAR; INTRAMUSCULAR at 15:02

## 2019-10-29 RX ADMIN — BUPIVACAINE HYDROCHLORIDE 7.5 MG: 2.5 INJECTION, SOLUTION INFILTRATION; PERINEURAL at 15:01

## 2019-10-30 ENCOUNTER — TELEPHONE (OUTPATIENT)
Dept: CARDIOLOGY CLINIC | Age: 74
End: 2019-10-30

## 2019-10-31 ENCOUNTER — HOSPITAL ENCOUNTER (OUTPATIENT)
Dept: CARDIAC CATH/INVASIVE PROCEDURES | Age: 74
Discharge: HOME | End: 2019-10-31
Payer: MEDICARE

## 2019-10-31 ENCOUNTER — ANESTHESIA EVENT (OUTPATIENT)
Dept: CARDIAC CATH/INVASIVE PROCEDURES | Age: 74
End: 2019-10-31

## 2019-10-31 ENCOUNTER — ANESTHESIA (OUTPATIENT)
Dept: CARDIAC CATH/INVASIVE PROCEDURES | Age: 74
End: 2019-10-31

## 2019-10-31 VITALS
HEART RATE: 99 BPM | DIASTOLIC BLOOD PRESSURE: 89 MMHG | WEIGHT: 138 LBS | HEIGHT: 63 IN | SYSTOLIC BLOOD PRESSURE: 148 MMHG | OXYGEN SATURATION: 98 % | RESPIRATION RATE: 24 BRPM | TEMPERATURE: 97.8 F | BODY MASS INDEX: 24.45 KG/M2

## 2019-10-31 VITALS
RESPIRATION RATE: 37 BRPM | DIASTOLIC BLOOD PRESSURE: 83 MMHG | OXYGEN SATURATION: 96 % | SYSTOLIC BLOOD PRESSURE: 128 MMHG

## 2019-10-31 LAB
LV EF: 55 %
LVEF MODALITY: NORMAL

## 2019-10-31 PROCEDURE — 2709999900 HC NON-CHARGEABLE SUPPLY

## 2019-10-31 PROCEDURE — 93325 DOPPLER ECHO COLOR FLOW MAPG: CPT

## 2019-10-31 PROCEDURE — 3700000001 HC ADD 15 MINUTES (ANESTHESIA)

## 2019-10-31 PROCEDURE — 93312 ECHO TRANSESOPHAGEAL: CPT

## 2019-10-31 PROCEDURE — 2580000003 HC RX 258: Performed by: ANESTHESIOLOGIST ASSISTANT

## 2019-10-31 PROCEDURE — 6360000002 HC RX W HCPCS: Performed by: ANESTHESIOLOGIST ASSISTANT

## 2019-10-31 PROCEDURE — 3700000000 HC ANESTHESIA ATTENDED CARE

## 2019-10-31 PROCEDURE — 2580000003 HC RX 258: Performed by: INTERNAL MEDICINE

## 2019-10-31 PROCEDURE — 93321 DOPPLER ECHO F-UP/LMTD STD: CPT

## 2019-10-31 RX ORDER — HYDRALAZINE HYDROCHLORIDE 100 MG/1
100 TABLET, FILM COATED ORAL 3 TIMES DAILY
Qty: 270 TABLET | Refills: 3 | Status: ON HOLD
Start: 2019-10-31 | End: 2020-02-28 | Stop reason: HOSPADM

## 2019-10-31 RX ORDER — SODIUM CHLORIDE 9 MG/ML
INJECTION, SOLUTION INTRAVENOUS ONCE
Status: COMPLETED | OUTPATIENT
Start: 2019-10-31 | End: 2019-10-31

## 2019-10-31 RX ORDER — SODIUM CHLORIDE 9 MG/ML
INJECTION, SOLUTION INTRAVENOUS CONTINUOUS PRN
Status: DISCONTINUED | OUTPATIENT
Start: 2019-10-31 | End: 2019-10-31 | Stop reason: SDUPTHER

## 2019-10-31 RX ORDER — PROPOFOL 10 MG/ML
INJECTION, EMULSION INTRAVENOUS PRN
Status: DISCONTINUED | OUTPATIENT
Start: 2019-10-31 | End: 2019-10-31 | Stop reason: SDUPTHER

## 2019-10-31 RX ADMIN — PROPOFOL 210 MG: 10 INJECTION, EMULSION INTRAVENOUS at 09:19

## 2019-10-31 RX ADMIN — SODIUM CHLORIDE: 9 INJECTION, SOLUTION INTRAVENOUS at 07:58

## 2019-10-31 RX ADMIN — SODIUM CHLORIDE: 9 INJECTION, SOLUTION INTRAVENOUS at 09:12

## 2019-11-07 ENCOUNTER — TELEPHONE (OUTPATIENT)
Dept: ENDOCRINOLOGY | Age: 74
End: 2019-11-07

## 2019-11-07 ENCOUNTER — TELEPHONE (OUTPATIENT)
Dept: PRIMARY CARE CLINIC | Age: 74
End: 2019-11-07

## 2019-11-08 RX ORDER — BLOOD-GLUCOSE METER
1 KIT MISCELLANEOUS DAILY
Qty: 1 KIT | Refills: 0 | Status: ON HOLD
Start: 2019-11-08 | End: 2020-02-28 | Stop reason: HOSPADM

## 2019-11-11 DIAGNOSIS — Z79.4 TYPE 2 DIABETES MELLITUS WITH DIABETIC NEPHROPATHY, WITH LONG-TERM CURRENT USE OF INSULIN (HCC): Primary | ICD-10-CM

## 2019-11-11 DIAGNOSIS — E11.21 TYPE 2 DIABETES MELLITUS WITH DIABETIC NEPHROPATHY, WITH LONG-TERM CURRENT USE OF INSULIN (HCC): Primary | ICD-10-CM

## 2019-11-11 RX ORDER — LANCETS 28 GAUGE
EACH MISCELLANEOUS
Qty: 200 EACH | Refills: 5 | Status: ON HOLD
Start: 2019-11-11 | End: 2020-02-28 | Stop reason: HOSPADM

## 2019-11-18 ENCOUNTER — OFFICE VISIT (OUTPATIENT)
Dept: CARDIOLOGY CLINIC | Age: 74
End: 2019-11-18
Payer: MEDICARE

## 2019-11-18 ENCOUNTER — HOSPITAL ENCOUNTER (OUTPATIENT)
Dept: OTHER | Age: 74
Setting detail: THERAPIES SERIES
Discharge: HOME | End: 2019-11-18
Payer: MEDICARE

## 2019-11-18 VITALS
BODY MASS INDEX: 40.5 KG/M2 | SYSTOLIC BLOOD PRESSURE: 140 MMHG | WEIGHT: 228.6 LBS | HEART RATE: 99 BPM | HEIGHT: 63 IN | OXYGEN SATURATION: 92 % | DIASTOLIC BLOOD PRESSURE: 72 MMHG | RESPIRATION RATE: 28 BRPM

## 2019-11-18 VITALS
BODY MASS INDEX: 40.57 KG/M2 | HEART RATE: 99 BPM | RESPIRATION RATE: 18 BRPM | DIASTOLIC BLOOD PRESSURE: 86 MMHG | SYSTOLIC BLOOD PRESSURE: 140 MMHG | WEIGHT: 229 LBS

## 2019-11-18 DIAGNOSIS — I25.10 CORONARY ARTERY DISEASE INVOLVING NATIVE CORONARY ARTERY OF NATIVE HEART WITHOUT ANGINA PECTORIS: ICD-10-CM

## 2019-11-18 DIAGNOSIS — E66.01 MORBID OBESITY (HCC): ICD-10-CM

## 2019-11-18 DIAGNOSIS — J44.9 CHRONIC OBSTRUCTIVE PULMONARY DISEASE, UNSPECIFIED COPD TYPE (HCC): ICD-10-CM

## 2019-11-18 DIAGNOSIS — Z01.818 PREOPERATIVE TESTING: ICD-10-CM

## 2019-11-18 DIAGNOSIS — N18.30 CHRONIC KIDNEY DISEASE, STAGE III (MODERATE) (HCC): ICD-10-CM

## 2019-11-18 DIAGNOSIS — I51.7 LVH (LEFT VENTRICULAR HYPERTROPHY): ICD-10-CM

## 2019-11-18 DIAGNOSIS — I50.32 CHRONIC HEART FAILURE WITH PRESERVED EJECTION FRACTION (HFPEF) (HCC): Primary | ICD-10-CM

## 2019-11-18 DIAGNOSIS — Z79.01 CHRONIC ANTICOAGULATION: ICD-10-CM

## 2019-11-18 DIAGNOSIS — I48.21 PERMANENT ATRIAL FIBRILLATION (HCC): ICD-10-CM

## 2019-11-18 DIAGNOSIS — Z79.4 TYPE 2 DIABETES MELLITUS WITH STAGE 3 CHRONIC KIDNEY DISEASE, WITH LONG-TERM CURRENT USE OF INSULIN (HCC): ICD-10-CM

## 2019-11-18 DIAGNOSIS — N18.30 TYPE 2 DIABETES MELLITUS WITH STAGE 3 CHRONIC KIDNEY DISEASE, WITH LONG-TERM CURRENT USE OF INSULIN (HCC): ICD-10-CM

## 2019-11-18 DIAGNOSIS — G62.9 PERIPHERAL POLYNEUROPATHY: ICD-10-CM

## 2019-11-18 DIAGNOSIS — G47.33 OBSTRUCTIVE SLEEP APNEA: ICD-10-CM

## 2019-11-18 DIAGNOSIS — I34.0 NONRHEUMATIC MITRAL VALVE REGURGITATION: ICD-10-CM

## 2019-11-18 DIAGNOSIS — M15.9 GENERALIZED OA: ICD-10-CM

## 2019-11-18 DIAGNOSIS — K21.9 GASTROESOPHAGEAL REFLUX DISEASE WITHOUT ESOPHAGITIS: ICD-10-CM

## 2019-11-18 DIAGNOSIS — E11.22 TYPE 2 DIABETES MELLITUS WITH STAGE 3 CHRONIC KIDNEY DISEASE, WITH LONG-TERM CURRENT USE OF INSULIN (HCC): ICD-10-CM

## 2019-11-18 LAB
ANION GAP SERPL CALCULATED.3IONS-SCNC: 17 MMOL/L (ref 7–16)
BUN BLDV-MCNC: 42 MG/DL (ref 8–23)
CALCIUM SERPL-MCNC: 9.1 MG/DL (ref 8.6–10.2)
CHLORIDE BLD-SCNC: 96 MMOL/L (ref 98–107)
CO2: 24 MMOL/L (ref 22–29)
CREAT SERPL-MCNC: 2 MG/DL (ref 0.5–1)
GFR AFRICAN AMERICAN: 29
GFR NON-AFRICAN AMERICAN: 29 ML/MIN/1.73
GLUCOSE BLD-MCNC: 329 MG/DL (ref 74–99)
POTASSIUM SERPL-SCNC: 4.8 MMOL/L (ref 3.5–5)
PRO-BNP: 3957 PG/ML (ref 0–450)
SODIUM BLD-SCNC: 137 MMOL/L (ref 132–146)

## 2019-11-18 PROCEDURE — 99214 OFFICE O/P EST MOD 30 MIN: CPT | Performed by: NURSE PRACTITIONER

## 2019-11-18 PROCEDURE — 99214 OFFICE O/P EST MOD 30 MIN: CPT

## 2019-11-18 PROCEDURE — 36415 COLL VENOUS BLD VENIPUNCTURE: CPT

## 2019-11-18 PROCEDURE — G8926 SPIRO NO PERF OR DOC: HCPCS | Performed by: NURSE PRACTITIONER

## 2019-11-18 PROCEDURE — G8482 FLU IMMUNIZE ORDER/ADMIN: HCPCS | Performed by: NURSE PRACTITIONER

## 2019-11-18 PROCEDURE — 93000 ELECTROCARDIOGRAM COMPLETE: CPT | Performed by: INTERNAL MEDICINE

## 2019-11-18 PROCEDURE — G8417 CALC BMI ABV UP PARAM F/U: HCPCS | Performed by: NURSE PRACTITIONER

## 2019-11-18 PROCEDURE — 1090F PRES/ABSN URINE INCON ASSESS: CPT | Performed by: NURSE PRACTITIONER

## 2019-11-18 PROCEDURE — 1123F ACP DISCUSS/DSCN MKR DOCD: CPT | Performed by: NURSE PRACTITIONER

## 2019-11-18 PROCEDURE — 4040F PNEUMOC VAC/ADMIN/RCVD: CPT | Performed by: NURSE PRACTITIONER

## 2019-11-18 PROCEDURE — G8598 ASA/ANTIPLAT THER USED: HCPCS | Performed by: NURSE PRACTITIONER

## 2019-11-18 PROCEDURE — 80048 BASIC METABOLIC PNL TOTAL CA: CPT

## 2019-11-18 PROCEDURE — 2022F DILAT RTA XM EVC RTNOPTHY: CPT | Performed by: NURSE PRACTITIONER

## 2019-11-18 PROCEDURE — 83880 ASSAY OF NATRIURETIC PEPTIDE: CPT

## 2019-11-18 PROCEDURE — 1036F TOBACCO NON-USER: CPT | Performed by: NURSE PRACTITIONER

## 2019-11-18 PROCEDURE — G8399 PT W/DXA RESULTS DOCUMENT: HCPCS | Performed by: NURSE PRACTITIONER

## 2019-11-18 PROCEDURE — 3017F COLORECTAL CA SCREEN DOC REV: CPT | Performed by: NURSE PRACTITIONER

## 2019-11-18 PROCEDURE — G8427 DOCREV CUR MEDS BY ELIG CLIN: HCPCS | Performed by: NURSE PRACTITIONER

## 2019-11-18 PROCEDURE — 3023F SPIROM DOC REV: CPT | Performed by: NURSE PRACTITIONER

## 2019-11-18 RX ORDER — DIPHENHYDRAMINE HCL 50 MG
CAPSULE ORAL
Qty: 1 CAPSULE | Refills: 0 | Status: SHIPPED | OUTPATIENT
Start: 2019-11-18 | End: 2019-11-26

## 2019-11-18 RX ORDER — PREDNISONE 50 MG/1
TABLET ORAL
Qty: 3 TABLET | Refills: 0 | Status: SHIPPED | OUTPATIENT
Start: 2019-11-18 | End: 2019-11-26

## 2019-11-20 ENCOUNTER — TELEPHONE (OUTPATIENT)
Dept: CARDIOLOGY CLINIC | Age: 74
End: 2019-11-20

## 2019-11-20 ENCOUNTER — HOSPITAL ENCOUNTER (OUTPATIENT)
Age: 74
Discharge: HOME | End: 2019-11-20
Payer: MEDICARE

## 2019-11-20 LAB
ANION GAP SERPL CALCULATED.3IONS-SCNC: 12 MMOL/L (ref 7–16)
BUN BLDV-MCNC: 45 MG/DL (ref 8–23)
CALCIUM SERPL-MCNC: 9.3 MG/DL (ref 8.6–10.2)
CHLORIDE BLD-SCNC: 102 MMOL/L (ref 98–107)
CO2: 27 MMOL/L (ref 22–29)
CREAT SERPL-MCNC: 2.3 MG/DL (ref 0.5–1)
GFR AFRICAN AMERICAN: 25
GFR NON-AFRICAN AMERICAN: 25 ML/MIN/1.73
GLUCOSE BLD-MCNC: 102 MG/DL (ref 74–99)
HCT VFR BLD CALC: 37.7 % (ref 34–48)
HEMOGLOBIN: 11.9 G/DL (ref 11.5–15.5)
INR BLD: 1.2
MCH RBC QN AUTO: 27.8 PG (ref 26–35)
MCHC RBC AUTO-ENTMCNC: 31.6 % (ref 32–34.5)
MCV RBC AUTO: 88.1 FL (ref 80–99.9)
PDW BLD-RTO: 15.7 FL (ref 11.5–15)
PLATELET # BLD: 175 E9/L (ref 130–450)
PMV BLD AUTO: 10 FL (ref 7–12)
POTASSIUM SERPL-SCNC: 5.2 MMOL/L (ref 3.5–5)
PROTHROMBIN TIME: 13.2 SEC (ref 9.3–12.4)
RBC # BLD: 4.28 E12/L (ref 3.5–5.5)
SODIUM BLD-SCNC: 141 MMOL/L (ref 132–146)
WBC # BLD: 7.2 E9/L (ref 4.5–11.5)

## 2019-11-20 PROCEDURE — 85610 PROTHROMBIN TIME: CPT

## 2019-11-20 PROCEDURE — 36415 COLL VENOUS BLD VENIPUNCTURE: CPT

## 2019-11-20 PROCEDURE — 80048 BASIC METABOLIC PNL TOTAL CA: CPT

## 2019-11-20 PROCEDURE — 85027 COMPLETE CBC AUTOMATED: CPT

## 2019-11-21 ENCOUNTER — TELEPHONE (OUTPATIENT)
Dept: PRIMARY CARE CLINIC | Age: 74
End: 2019-11-21

## 2019-11-21 ENCOUNTER — HOSPITAL ENCOUNTER (OUTPATIENT)
Dept: CARDIAC CATH/INVASIVE PROCEDURES | Age: 74
Discharge: HOME | End: 2019-11-21
Payer: MEDICARE

## 2019-11-21 VITALS
RESPIRATION RATE: 17 BRPM | DIASTOLIC BLOOD PRESSURE: 89 MMHG | SYSTOLIC BLOOD PRESSURE: 146 MMHG | WEIGHT: 227 LBS | HEART RATE: 102 BPM | TEMPERATURE: 97.7 F | BODY MASS INDEX: 40.21 KG/M2 | OXYGEN SATURATION: 96 %

## 2019-11-21 PROBLEM — I50.32 CHRONIC DIASTOLIC CONGESTIVE HEART FAILURE, NYHA CLASS 3 (HCC): Status: ACTIVE | Noted: 2019-09-25

## 2019-11-21 LAB
ABO/RH: NORMAL
ANTIBODY IDENTIFICATION: NORMAL
ANTIBODY IDENTIFICATION: NORMAL
ANTIBODY SCREEN: NORMAL
C ANTIGEN: NORMAL
DAT POLYSPECIFIC: NORMAL
DR. NOTIFY: NORMAL
E ANTIGEN: NORMAL

## 2019-11-21 PROCEDURE — C1769 GUIDE WIRE: HCPCS

## 2019-11-21 PROCEDURE — 2500000003 HC RX 250 WO HCPCS

## 2019-11-21 PROCEDURE — 86870 RBC ANTIBODY IDENTIFICATION: CPT

## 2019-11-21 PROCEDURE — C1751 CATH, INF, PER/CENT/MIDLINE: HCPCS

## 2019-11-21 PROCEDURE — 86880 COOMBS TEST DIRECT: CPT

## 2019-11-21 PROCEDURE — 86905 BLOOD TYPING RBC ANTIGENS: CPT

## 2019-11-21 PROCEDURE — C2624 WIRELESS PRESSURE SENSOR: HCPCS

## 2019-11-21 PROCEDURE — C1894 INTRO/SHEATH, NON-LASER: HCPCS

## 2019-11-21 PROCEDURE — 86900 BLOOD TYPING SEROLOGIC ABO: CPT

## 2019-11-21 PROCEDURE — 6360000002 HC RX W HCPCS

## 2019-11-21 PROCEDURE — 86850 RBC ANTIBODY SCREEN: CPT

## 2019-11-21 PROCEDURE — 33289 TCAT IMPL WRLS P-ART PRS SNR: CPT | Performed by: INTERNAL MEDICINE

## 2019-11-21 PROCEDURE — 86901 BLOOD TYPING SEROLOGIC RH(D): CPT

## 2019-11-21 PROCEDURE — 2709999900 HC NON-CHARGEABLE SUPPLY

## 2019-11-21 PROCEDURE — 36415 COLL VENOUS BLD VENIPUNCTURE: CPT

## 2019-11-21 RX ORDER — SODIUM CHLORIDE 9 MG/ML
INJECTION, SOLUTION INTRAVENOUS ONCE
Status: DISCONTINUED | OUTPATIENT
Start: 2019-11-21 | End: 2019-11-22 | Stop reason: HOSPADM

## 2019-11-22 PROBLEM — R76.8 RED BLOOD CELL ANTIBODY POSITIVE: Chronic | Status: ACTIVE | Noted: 2019-11-22

## 2019-11-26 ENCOUNTER — OFFICE VISIT (OUTPATIENT)
Dept: PRIMARY CARE CLINIC | Age: 74
End: 2019-11-26
Payer: MEDICARE

## 2019-11-26 VITALS
RESPIRATION RATE: 18 BRPM | DIASTOLIC BLOOD PRESSURE: 70 MMHG | HEIGHT: 63 IN | OXYGEN SATURATION: 94 % | HEART RATE: 88 BPM | SYSTOLIC BLOOD PRESSURE: 128 MMHG | BODY MASS INDEX: 40.21 KG/M2

## 2019-11-26 DIAGNOSIS — I50.23 ACUTE ON CHRONIC SYSTOLIC CONGESTIVE HEART FAILURE (HCC): ICD-10-CM

## 2019-11-26 DIAGNOSIS — Z00.00 ROUTINE GENERAL MEDICAL EXAMINATION AT A HEALTH CARE FACILITY: ICD-10-CM

## 2019-11-26 DIAGNOSIS — I50.32 CHRONIC HEART FAILURE WITH PRESERVED EJECTION FRACTION (HFPEF) (HCC): ICD-10-CM

## 2019-11-26 DIAGNOSIS — Z12.31 ENCOUNTER FOR SCREENING MAMMOGRAM FOR MALIGNANT NEOPLASM OF BREAST: Primary | ICD-10-CM

## 2019-11-26 PROCEDURE — 3017F COLORECTAL CA SCREEN DOC REV: CPT | Performed by: FAMILY MEDICINE

## 2019-11-26 PROCEDURE — G8482 FLU IMMUNIZE ORDER/ADMIN: HCPCS | Performed by: FAMILY MEDICINE

## 2019-11-26 PROCEDURE — G8598 ASA/ANTIPLAT THER USED: HCPCS | Performed by: FAMILY MEDICINE

## 2019-11-26 PROCEDURE — 1123F ACP DISCUSS/DSCN MKR DOCD: CPT | Performed by: FAMILY MEDICINE

## 2019-11-26 PROCEDURE — G0439 PPPS, SUBSEQ VISIT: HCPCS | Performed by: FAMILY MEDICINE

## 2019-11-26 PROCEDURE — 4040F PNEUMOC VAC/ADMIN/RCVD: CPT | Performed by: FAMILY MEDICINE

## 2019-11-26 RX ORDER — BUMETANIDE 2 MG/1
2 TABLET ORAL 2 TIMES DAILY
Qty: 180 TABLET | Refills: 1 | Status: ON HOLD
Start: 2019-11-26 | End: 2020-02-28 | Stop reason: HOSPADM

## 2019-11-26 SDOH — ECONOMIC STABILITY: INCOME INSECURITY: HOW HARD IS IT FOR YOU TO PAY FOR THE VERY BASICS LIKE FOOD, HOUSING, MEDICAL CARE, AND HEATING?: NOT VERY HARD

## 2019-11-26 ASSESSMENT — PATIENT HEALTH QUESTIONNAIRE - PHQ9
SUM OF ALL RESPONSES TO PHQ QUESTIONS 1-9: 2
SUM OF ALL RESPONSES TO PHQ9 QUESTIONS 1 & 2: 2
2. FEELING DOWN, DEPRESSED OR HOPELESS: 1
SUM OF ALL RESPONSES TO PHQ QUESTIONS 1-9: 2
1. LITTLE INTEREST OR PLEASURE IN DOING THINGS: 1

## 2019-12-03 ENCOUNTER — TELEPHONE (OUTPATIENT)
Dept: CARDIOLOGY CLINIC | Age: 74
End: 2019-12-03

## 2019-12-03 RX ORDER — METOLAZONE 2.5 MG/1
2.5 TABLET ORAL SEE ADMIN INSTRUCTIONS
Qty: 30 TABLET | Refills: 3 | Status: ON HOLD
Start: 2019-12-03 | End: 2020-02-28 | Stop reason: HOSPADM

## 2019-12-06 RX ORDER — POTASSIUM CHLORIDE 1.5 G/1.77G
POWDER, FOR SOLUTION ORAL
Qty: 60 EACH | Refills: 5 | Status: ON HOLD
Start: 2019-12-06 | End: 2020-02-28 | Stop reason: HOSPADM

## 2019-12-12 ENCOUNTER — HOSPITAL ENCOUNTER (OUTPATIENT)
Dept: OTHER | Age: 74
Setting detail: THERAPIES SERIES
Discharge: HOME | End: 2019-12-12
Payer: MEDICARE

## 2019-12-12 VITALS
WEIGHT: 236 LBS | BODY MASS INDEX: 41.81 KG/M2 | DIASTOLIC BLOOD PRESSURE: 64 MMHG | RESPIRATION RATE: 18 BRPM | SYSTOLIC BLOOD PRESSURE: 136 MMHG

## 2019-12-12 LAB
ANION GAP SERPL CALCULATED.3IONS-SCNC: 11 MMOL/L (ref 7–16)
BUN BLDV-MCNC: 63 MG/DL (ref 8–23)
CALCIUM SERPL-MCNC: 8.9 MG/DL (ref 8.6–10.2)
CHLORIDE BLD-SCNC: 95 MMOL/L (ref 98–107)
CO2: 33 MMOL/L (ref 22–29)
CREAT SERPL-MCNC: 2.6 MG/DL (ref 0.5–1)
GFR AFRICAN AMERICAN: 22
GFR NON-AFRICAN AMERICAN: 22 ML/MIN/1.73
GLUCOSE BLD-MCNC: 247 MG/DL (ref 74–99)
POTASSIUM SERPL-SCNC: 4.6 MMOL/L (ref 3.5–5)
PRO-BNP: 3066 PG/ML (ref 0–450)
SODIUM BLD-SCNC: 139 MMOL/L (ref 132–146)

## 2019-12-12 PROCEDURE — 83880 ASSAY OF NATRIURETIC PEPTIDE: CPT

## 2019-12-12 PROCEDURE — 36415 COLL VENOUS BLD VENIPUNCTURE: CPT

## 2019-12-12 PROCEDURE — 99214 OFFICE O/P EST MOD 30 MIN: CPT

## 2019-12-12 PROCEDURE — 80048 BASIC METABOLIC PNL TOTAL CA: CPT

## 2019-12-13 ENCOUNTER — OFFICE VISIT (OUTPATIENT)
Dept: FAMILY MEDICINE CLINIC | Age: 74
End: 2019-12-13
Payer: MEDICARE

## 2019-12-13 VITALS
DIASTOLIC BLOOD PRESSURE: 80 MMHG | RESPIRATION RATE: 20 BRPM | SYSTOLIC BLOOD PRESSURE: 130 MMHG | HEART RATE: 100 BPM | BODY MASS INDEX: 41.82 KG/M2 | HEIGHT: 63 IN | OXYGEN SATURATION: 99 % | TEMPERATURE: 98.4 F | WEIGHT: 236 LBS

## 2019-12-13 DIAGNOSIS — J01.90 ACUTE NON-RECURRENT SINUSITIS, UNSPECIFIED LOCATION: Primary | ICD-10-CM

## 2019-12-13 DIAGNOSIS — R07.0 PAIN IN THROAT: ICD-10-CM

## 2019-12-13 DIAGNOSIS — R51.9 SINUS HEADACHE: ICD-10-CM

## 2019-12-13 DIAGNOSIS — R09.82 POSTNASAL DRIP: ICD-10-CM

## 2019-12-13 PROCEDURE — 1036F TOBACCO NON-USER: CPT | Performed by: PHYSICIAN ASSISTANT

## 2019-12-13 PROCEDURE — G8427 DOCREV CUR MEDS BY ELIG CLIN: HCPCS | Performed by: PHYSICIAN ASSISTANT

## 2019-12-13 PROCEDURE — 4040F PNEUMOC VAC/ADMIN/RCVD: CPT | Performed by: PHYSICIAN ASSISTANT

## 2019-12-13 PROCEDURE — G8417 CALC BMI ABV UP PARAM F/U: HCPCS | Performed by: PHYSICIAN ASSISTANT

## 2019-12-13 PROCEDURE — G8598 ASA/ANTIPLAT THER USED: HCPCS | Performed by: PHYSICIAN ASSISTANT

## 2019-12-13 PROCEDURE — G8482 FLU IMMUNIZE ORDER/ADMIN: HCPCS | Performed by: PHYSICIAN ASSISTANT

## 2019-12-13 PROCEDURE — 99213 OFFICE O/P EST LOW 20 MIN: CPT | Performed by: PHYSICIAN ASSISTANT

## 2019-12-13 PROCEDURE — 1123F ACP DISCUSS/DSCN MKR DOCD: CPT | Performed by: PHYSICIAN ASSISTANT

## 2019-12-13 PROCEDURE — 3017F COLORECTAL CA SCREEN DOC REV: CPT | Performed by: PHYSICIAN ASSISTANT

## 2019-12-13 PROCEDURE — 1090F PRES/ABSN URINE INCON ASSESS: CPT | Performed by: PHYSICIAN ASSISTANT

## 2019-12-13 PROCEDURE — G8399 PT W/DXA RESULTS DOCUMENT: HCPCS | Performed by: PHYSICIAN ASSISTANT

## 2019-12-13 RX ORDER — DOXYCYCLINE HYCLATE 100 MG
100 TABLET ORAL 2 TIMES DAILY
Qty: 20 TABLET | Refills: 0 | Status: SHIPPED | OUTPATIENT
Start: 2019-12-13 | End: 2019-12-23

## 2019-12-13 RX ORDER — GUAIFENESIN 600 MG/1
600 TABLET, EXTENDED RELEASE ORAL 2 TIMES DAILY
Qty: 30 TABLET | Refills: 0 | Status: SHIPPED | OUTPATIENT
Start: 2019-12-13 | End: 2019-12-28

## 2019-12-16 RX ORDER — MECLIZINE HCL 12.5 MG/1
12.5 TABLET ORAL 3 TIMES DAILY
Qty: 90 TABLET | Refills: 2 | Status: SHIPPED | OUTPATIENT
Start: 2019-12-16 | End: 2020-01-03 | Stop reason: SDUPTHER

## 2019-12-16 RX ORDER — NITROGLYCERIN 0.4 MG/1
0.4 TABLET SUBLINGUAL EVERY 5 MIN PRN
Qty: 75 TABLET | Refills: 4 | Status: SHIPPED | OUTPATIENT
Start: 2019-12-16

## 2020-01-03 RX ORDER — MECLIZINE HCL 12.5 MG/1
12.5 TABLET ORAL 3 TIMES DAILY
Qty: 90 TABLET | Refills: 2 | Status: ON HOLD
Start: 2020-01-03 | End: 2020-02-28 | Stop reason: HOSPADM

## 2020-01-03 RX ORDER — FAMOTIDINE 20 MG/1
20 TABLET, FILM COATED ORAL DAILY
Qty: 60 TABLET | Refills: 5 | Status: SHIPPED | OUTPATIENT
Start: 2020-01-03

## 2020-01-06 RX ORDER — ALBUTEROL SULFATE 90 UG/1
2 AEROSOL, METERED RESPIRATORY (INHALATION) EVERY 6 HOURS PRN
Qty: 3 INHALER | Refills: 4 | Status: SHIPPED | OUTPATIENT
Start: 2020-01-06

## 2020-01-08 ENCOUNTER — TELEPHONE (OUTPATIENT)
Dept: PRIMARY CARE CLINIC | Age: 75
End: 2020-01-08

## 2020-01-08 NOTE — TELEPHONE ENCOUNTER
Spoke with pt regarding overdue mammogram, pt stated she had been out of town for the holidays.  Stated she would go and get it done in the next month

## 2020-01-10 ENCOUNTER — TELEPHONE (OUTPATIENT)
Dept: CARDIOLOGY CLINIC | Age: 75
End: 2020-01-10

## 2020-01-10 NOTE — TELEPHONE ENCOUNTER
GUIDE HF STUDY PT    Patient left a message on my VM to let office know she was out of town and therefore she missed a few days of readings. I am unable to see this pt on [email or web address] due to her being part of the study. This is managed per research. Will forward my note to Tresa Inman, who is managing. Please call and update pt regarding her readings and your contact information for further needs.

## 2020-01-13 RX ORDER — ATORVASTATIN CALCIUM 40 MG/1
40 TABLET, FILM COATED ORAL DAILY
Qty: 30 TABLET | Refills: 5 | Status: SHIPPED | OUTPATIENT
Start: 2020-01-13

## 2020-01-13 RX ORDER — LEVOTHYROXINE SODIUM 0.07 MG/1
75 TABLET ORAL DAILY
Qty: 90 TABLET | Refills: 5 | Status: SHIPPED | OUTPATIENT
Start: 2020-01-13

## 2020-01-15 ENCOUNTER — OFFICE VISIT (OUTPATIENT)
Dept: ENDOCRINOLOGY | Age: 75
End: 2020-01-15
Payer: MEDICARE

## 2020-01-15 VITALS
HEART RATE: 102 BPM | HEIGHT: 63 IN | WEIGHT: 230.8 LBS | DIASTOLIC BLOOD PRESSURE: 72 MMHG | RESPIRATION RATE: 18 BRPM | BODY MASS INDEX: 40.89 KG/M2 | OXYGEN SATURATION: 99 % | SYSTOLIC BLOOD PRESSURE: 122 MMHG

## 2020-01-15 LAB — HBA1C MFR BLD: 8.2 %

## 2020-01-15 PROCEDURE — G8482 FLU IMMUNIZE ORDER/ADMIN: HCPCS | Performed by: INTERNAL MEDICINE

## 2020-01-15 PROCEDURE — 4040F PNEUMOC VAC/ADMIN/RCVD: CPT | Performed by: INTERNAL MEDICINE

## 2020-01-15 PROCEDURE — 3017F COLORECTAL CA SCREEN DOC REV: CPT | Performed by: INTERNAL MEDICINE

## 2020-01-15 PROCEDURE — G8399 PT W/DXA RESULTS DOCUMENT: HCPCS | Performed by: INTERNAL MEDICINE

## 2020-01-15 PROCEDURE — 1036F TOBACCO NON-USER: CPT | Performed by: INTERNAL MEDICINE

## 2020-01-15 PROCEDURE — 99214 OFFICE O/P EST MOD 30 MIN: CPT | Performed by: INTERNAL MEDICINE

## 2020-01-15 PROCEDURE — 1090F PRES/ABSN URINE INCON ASSESS: CPT | Performed by: INTERNAL MEDICINE

## 2020-01-15 PROCEDURE — 83036 HEMOGLOBIN GLYCOSYLATED A1C: CPT | Performed by: INTERNAL MEDICINE

## 2020-01-15 PROCEDURE — 2022F DILAT RTA XM EVC RTNOPTHY: CPT | Performed by: INTERNAL MEDICINE

## 2020-01-15 PROCEDURE — 1123F ACP DISCUSS/DSCN MKR DOCD: CPT | Performed by: INTERNAL MEDICINE

## 2020-01-15 PROCEDURE — G8417 CALC BMI ABV UP PARAM F/U: HCPCS | Performed by: INTERNAL MEDICINE

## 2020-01-15 PROCEDURE — G8427 DOCREV CUR MEDS BY ELIG CLIN: HCPCS | Performed by: INTERNAL MEDICINE

## 2020-01-15 NOTE — PROGRESS NOTES
[address] [place] [address] Department of Endocrinology Diabetes and Metabolism   [address] [facility] [address]   Phone: [phone]  Fax: [phone]    Date of Service: 1/15/2020    Primary Care Physician: Danny Mcardle, DO  Provider: Nicole Billingsley MD     Reason for the visit:  DM type 2 on insulin     History of Present Illness: The history is provided by the patient. No  was used. Accuracy of the patient data is excellent. Reyna Phillip is a very pleasant 76 y.o. female seen today for follow up visit     Reyna Phillip was diagnosed with diabetes long time ago and she is current regimen: Basaglar 55 units , Novolog 20 sliding scale only 2:50 >150   Insulin decreased since living with family and diet more controlled  Asking today for more dietary information, now on low salt diet as well  Checking BS bid  See download of readings: ,   Lab Results   Component Value Date    LABA1C 7.4 08/23/2019    LABA1C 10.5 04/15/2019    LABA1C 6.7 01/14/2019    LABA1C 8.2 01/04/2018     Microvascular complications: +neuropathy and microalbuminuria, + early diabetic retinopathy changes. She does not follow with nephrology. Macrovascular complications: +CAD, negative for stroke or pvd. Last eye exam: up to date    Seeing podiatry     DXA 12/5/2013 Forearm T score: -1.1, LS t score:-2.5, fem neck -1, total hip:-1.1. The spine T score is not reliable due to hardware. [facility]:  Fem neck T-score of -1.0   LS T score -2.2   Left forearm T score of -1.3   The spine T score is not reliable due to hardware. Most recent DXA scan 7/9/2019   Bone mineral density in the region of the right wrist demonstrates bone density of 0.541 g/cm squared which is -2.3 (T score)   Bone mineral density in the region of the right and left hip  demonstrates bone density of 0.844 g/ cm squared which is -1.3 of (T score)     Surgical menopause at age 25, HRT until age 48.  Patient had 100 MG tablet Take 1 tablet by mouth 3 times daily 270 tablet 3    insulin lispro (HUMALOG) 100 UNIT/ML injection vial Inject 20 Units into the skin 3 times daily (before meals)      magnesium hydroxide (MILK OF MAGNESIA) 400 MG/5ML suspension Take 30 mLs by mouth daily as needed for Constipation      acetaminophen (TYLENOL) 325 MG tablet Take 2 tablets by mouth every 4 hours as needed for Fever (For temp greater than 100.5 F (38 C)) 120 tablet 3    ipratropium-albuterol (DUONEB) 0.5-2.5 (3) MG/3ML SOLN nebulizer solution Inhale 3 mLs into the lungs every 4 hours (while awake) 360 mL 0    insulin glargine (BASAGLAR KWIKPEN) 100 UNIT/ML injection pen Inject 55 Units into the skin nightly 6 pen 5    Insulin Pen Needle (BD PEN NEEDLE IMELDA U/F) 32G X 4 MM MISC 1 each by Does not apply route 5 times daily 300 each 4    blood glucose test strips (ASCENSIA AUTODISC VI;ONE TOUCH ULTRA TEST VI) strip 1 each by In Vitro route daily As needed. 100 each 3    Alcohol Swabs (ALCOHOL PREP) PADS 1 applicator by Does not apply route three times daily 100 each 1    Lancets MISC Uses 4 time a day with blood sugar check 300 each 5    carvedilol (COREG) 25 MG tablet Take 1 tablet by mouth 2 times daily (with meals) 60 tablet 5    fluticasone (FLONASE) 50 MCG/ACT nasal spray 2 sprays by Nasal route every morning 1 Bottle 5    lisinopril (PRINIVIL;ZESTRIL) 20 MG tablet Take 1 tablet by mouth 2 times daily 60 tablet 5    loperamide (IMODIUM) 2 MG capsule Take 1 capsule by mouth 4 times daily as needed for Diarrhea 120 capsule 5    TRUEPLUS LANCETS 33G MISC Test blood sugars three times daily E11.9 100 each 5    aspirin 81 MG tablet Take 81 mg by mouth every morning        No current facility-administered medications for this visit. Review of Systems  Constitutional: No fever, no chills, no diaphoresis, no generalized weakness.   HEENT: No blurred vision, No sore throat, no ear pain, no hair loss  Neck: denied any neck swelling, difficulty swallowing,   Cardio-pulmonary: No CP, SOB or palpitation, No orthopnea or PND. No cough or wheezing. GI: No N/V/D, no constipation, No abdominal pain, no melena or hematochezia   : Denied any dysuria, hematuria, flank pain, discharge, or incontinence. Skin: denied any rash, ulcer, Hirsute, or hyperpigmentation. MSK: denied any joint deformity, joint pain/swelling, muscle pain, or back pain. Neuro: no numbness, no tingling, no weakness, _    OBJECTIVE    There were no vitals taken for this visit. BP Readings from Last 4 Encounters:   12/13/19 130/80   12/12/19 136/64   11/26/19 128/70   11/21/19 (!) 146/89     Wt Readings from Last 6 Encounters:   12/13/19 236 lb (107 kg)   12/12/19 236 lb (107 kg)   11/21/19 227 lb (103 kg)   11/18/19 229 lb (103.9 kg)   11/18/19 228 lb 9.6 oz (103.7 kg)   10/31/19 138 lb (62.6 kg)       Physical examination:  General: awake alert, oriented x3, no abnormal position or movements. HEENT: normocephalic non-traumatic, no exophthalmos   Neck: supple, no LN enlargement, no thyromegaly, no thyroid tenderness, no JVD. Pulm: Clear equal air entry no added sounds, no wheezing or rhonchi    CVS: S1 + S2, no murmur, no heave. Dorsalis pedis pulse palpable   Abd: soft lax, no tenderness, no organomegaly, audible bowel sounds. Skin: warm, no lesions, no rash.  No callus, no Ulcers, No acanthosis nigricans  Musculoskeletal: No back tenderness, no kyphosis/scoliosis    Neuro: CN intact, Monofilament sensation decreased bilateral , muscle power normal  Psych: normal mood, and affect      Review of Laboratory Data:  I personally reviewed the following lab:  Lab Results   Component Value Date/Time    WBC 7.2 11/20/2019 11:43 AM    RBC 4.28 11/20/2019 11:43 AM    HGB 11.9 11/20/2019 11:43 AM    HCT 37.7 11/20/2019 11:43 AM    MCV 88.1 11/20/2019 11:43 AM    MCH 27.8 11/20/2019 11:43 AM    MCHC 31.6 (L) 11/20/2019 11:43 AM    RDW 15.7 (H) 11/20/2019 11:43 AM     11/20/2019 11:43 AM    MPV 10.0 11/20/2019 11:43 AM      Lab Results   Component Value Date/Time     12/12/2019 12:40 PM    K 4.6 12/12/2019 12:40 PM    K 5.1 (H) 02/07/2018 05:45 AM    CO2 33 (H) 12/12/2019 12:40 PM    BUN 63 (H) 12/12/2019 12:40 PM    CREATININE 2.6 (H) 12/12/2019 12:40 PM    CALCIUM 8.9 12/12/2019 12:40 PM    LABGLOM 22 12/12/2019 12:40 PM    GFRAA 22 12/12/2019 12:40 PM      Lab Results   Component Value Date/Time    TSH 1.730 08/23/2019 10:31 AM    T4FREE 1.97 (H) 01/16/2017 12:00 PM    TPOABS 0.4 01/16/2017 12:00 PM    THGAB <0.9 01/16/2017 12:00 PM     Lab Results   Component Value Date    LABA1C 7.4 08/23/2019    GLUCOSE 247 12/12/2019    MALBCR 135.4 01/04/2018    LABMICR 32.5 01/04/2018    LABCREA 24 01/04/2018     Lab Results   Component Value Date    LABA1C 7.4 08/23/2019    LABA1C 10.5 04/15/2019    LABA1C 6.7 01/14/2019     Lab Results   Component Value Date    CHOL 141 08/23/2019    CHOL 148 04/15/2019    TRIG 58 08/23/2019    TRIG 91 04/15/2019    HDL 47 08/23/2019    HDL 49 04/15/2019     Lab Results   Component Value Date    VITD25 48 08/23/2019    VITD25 52 04/15/2019       Medical Records/Labs/Images review:   I personally reviewed and summarized previous records   All labs were independently reviewed     [address], a 76 y.o.-old female seen in for the following issues     Diabetes Mellitus Type 2     · Patient's diabetes is uncontrol   · Will change DM regimen to Lantus 60 units , Novolog 18 u with meals + sliding scale 2:50 >150    · The patient was advised to check blood sugars 4 times a day before meals and at bedtime and send BS readings to our office in a week.   · Discussed with patient A1c and blood sugar goals   · Optimal blood sugars: 100-140 pre-prandial, < 180 peak post-prandial  · The patient counseled about the complications of uncontrolled diabetes   · Patient was counselled about the importance of self-blood glucose monitoring and eating consistent carb diet to avoid blood sugar fluctuations   · Patient will need routine diabetes maintenance and prevention  · Discussed lifestyle changes including diet and exercise with patient; recommended 150 minutes of moderate intensity exercise per week. · Diabetes labs before next visit     Dietary noncompliance   Discussed with patient the importance of eating consistent carbohydrate meals, avoiding high glycemic index food. Also, discussed with patient the risk and negative consequences of dietary noncompliance on blood glucose control, blood pressure and weight    Osteopenia  · Continue vitD supplement  · Check level before next visit     Hypothyroidism   · At goal, continue levothyroxine 75 mcg daily  · Lab before next visit     Hyperlipidemia   · Continue Lipitor 40 mg daily     Return in about 4 months (around 5/15/2020) for DM type 2 on insulin . The above issues were reviewed with the patient who understood and agreed with the plan. Thank you for allowing us to participate in the care of this patient. Please do not hesitate to contact us with any additional questions. Diagnosis Orders   1. Type 2 diabetes mellitus with diabetic nephropathy, with long-term current use of insulin ([facility])  POCT glycosylated hemoglobin (Hb A1C)    Basic Metabolic Panel    Hemoglobin A1C    Microalbumin / Creatinine Urine Ratio   2. Hypothyroidism, unspecified type  TSH without Reflex    T4, Free   3.  Vitamin D deficiency  Vitamin D 25 Hydroxy     Catrina Ramos MD  Endocrinologist, [facility] and Endocrinology   [address] [place] [id number]   Phone: [phone]  Fax: [phone]  --------------------------------------------  Electronically signed by Sree Kim MD

## 2020-01-15 NOTE — PATIENT INSTRUCTIONS
Recommendations for today's visit  · Continue current thyroid medications   · Change Basaglar 60 units daily at bedtime  · Take Novolog with meals as per sliding scale below   Blood sugar 100-150: take 18 Units of Novolog  Blood sugar 151-200: take 20 Units of Novolog  Blood sugar 201-250 : take 22 Units of Novolog  Blood sugar 251 - 300: take 24 units of Novolog  Blood sugar 301 - 350 : take 26 Units of Novolog  Blood sugar >350 : take 28 Units of Novolog  If blood sugar <100 don't take Novolog     · Check Blood sugar 4 times/day before meals and at bedtime and send us sugar log in a week or two. You can fax, mail or email your sugar log     Email:  [email or web address]     These are your blood sugar, blood pressure, cholesterol and A1c goals:  · Blood sugar fastin mg/dl to 130 mg/dl  · Blood sugar before meals: <150 mg/dl  · Peak blood sugar lower than 180 mg/dl  · Bad cholesterol (LDL cholesterol): less than 100 mg/dl  · Blood pressure: less than 140/80 mmHg\  · A1c: between 6.5 - 7.5%      Steps for managing low blood sugar  1. Eat 15 grams of glucose of simple carbohydrate, as found in:   1 tablespoon sugar, Jose Guadalupe or corn syrup    4 oz (1/2 cup) of juice or regular soda   Glucose Tablet or gel (follow package instruction)   2. Wait 15 min and check blood sugar again   3. Repeat until blood sugar within range  4. Once within range, follow up with snack or meal within 1 hour      I you have any questions please call Dr. Chloe Banerjee office       Juliano Zamora MD  Endocrinologist, [place] [address] [place] [id number]   Phone: [phone]  Fax: [phone]  Email: [email or web address]

## 2020-01-16 ENCOUNTER — HOSPITAL ENCOUNTER (OUTPATIENT)
Dept: OTHER | Age: 75
Setting detail: THERAPIES SERIES
End: 2020-01-16
Payer: MEDICARE

## 2020-01-29 ENCOUNTER — HOSPITAL ENCOUNTER (OUTPATIENT)
Dept: OTHER | Age: 75
Setting detail: THERAPIES SERIES
Discharge: HOME | End: 2020-01-29
Payer: MEDICARE

## 2020-01-29 VITALS
RESPIRATION RATE: 18 BRPM | DIASTOLIC BLOOD PRESSURE: 68 MMHG | WEIGHT: 232 LBS | HEART RATE: 84 BPM | SYSTOLIC BLOOD PRESSURE: 130 MMHG | BODY MASS INDEX: 41.1 KG/M2

## 2020-01-29 LAB
ANION GAP SERPL CALCULATED.3IONS-SCNC: 10 MMOL/L (ref 7–16)
BUN BLDV-MCNC: 67 MG/DL (ref 8–23)
CALCIUM SERPL-MCNC: 9 MG/DL (ref 8.6–10.2)
CHLORIDE BLD-SCNC: 101 MMOL/L (ref 98–107)
CO2: 28 MMOL/L (ref 22–29)
CREAT SERPL-MCNC: 3 MG/DL (ref 0.5–1)
GFR AFRICAN AMERICAN: 18
GFR NON-AFRICAN AMERICAN: 18 ML/MIN/1.73
GLUCOSE BLD-MCNC: 139 MG/DL (ref 74–99)
POTASSIUM SERPL-SCNC: 4.6 MMOL/L (ref 3.5–5)
PRO-BNP: 4328 PG/ML (ref 0–450)
SODIUM BLD-SCNC: 139 MMOL/L (ref 132–146)

## 2020-01-29 PROCEDURE — 80048 BASIC METABOLIC PNL TOTAL CA: CPT

## 2020-01-29 PROCEDURE — 99204 OFFICE O/P NEW MOD 45 MIN: CPT

## 2020-01-29 PROCEDURE — 36415 COLL VENOUS BLD VENIPUNCTURE: CPT

## 2020-01-29 PROCEDURE — 2500000003 HC RX 250 WO HCPCS: Performed by: INTERNAL MEDICINE

## 2020-01-29 PROCEDURE — 96374 THER/PROPH/DIAG INJ IV PUSH: CPT

## 2020-01-29 PROCEDURE — 2580000003 HC RX 258: Performed by: INTERNAL MEDICINE

## 2020-01-29 PROCEDURE — 83880 ASSAY OF NATRIURETIC PEPTIDE: CPT

## 2020-01-29 RX ORDER — SODIUM CHLORIDE 0.9 % (FLUSH) 0.9 %
10 SYRINGE (ML) INJECTION PRN
Status: DISCONTINUED | OUTPATIENT
Start: 2020-01-29 | End: 2020-01-30 | Stop reason: HOSPADM

## 2020-01-29 RX ORDER — BUMETANIDE 0.25 MG/ML
2 INJECTION, SOLUTION INTRAMUSCULAR; INTRAVENOUS ONCE
Status: COMPLETED | OUTPATIENT
Start: 2020-01-29 | End: 2020-01-29

## 2020-01-29 RX ADMIN — BUMETANIDE 2 MG: 0.25 INJECTION INTRAMUSCULAR; INTRAVENOUS at 09:53

## 2020-01-29 RX ADMIN — Medication 10 ML: at 09:53

## 2020-01-31 ENCOUNTER — CLINICAL DOCUMENTATION (OUTPATIENT)
Dept: RESEARCH | Age: 75
End: 2020-01-31

## 2020-01-31 ENCOUNTER — CLINICAL DOCUMENTATION (OUTPATIENT)
Dept: RHEUMATOLOGY | Age: 75
End: 2020-01-31

## 2020-02-03 ENCOUNTER — OFFICE VISIT (OUTPATIENT)
Dept: ENT CLINIC | Age: 75
End: 2020-02-03
Payer: MEDICARE

## 2020-02-03 VITALS
HEIGHT: 63 IN | WEIGHT: 230 LBS | HEART RATE: 63 BPM | BODY MASS INDEX: 40.75 KG/M2 | SYSTOLIC BLOOD PRESSURE: 130 MMHG | DIASTOLIC BLOOD PRESSURE: 83 MMHG

## 2020-02-03 PROCEDURE — G8417 CALC BMI ABV UP PARAM F/U: HCPCS | Performed by: OTOLARYNGOLOGY

## 2020-02-03 PROCEDURE — 1090F PRES/ABSN URINE INCON ASSESS: CPT | Performed by: OTOLARYNGOLOGY

## 2020-02-03 PROCEDURE — G8427 DOCREV CUR MEDS BY ELIG CLIN: HCPCS | Performed by: OTOLARYNGOLOGY

## 2020-02-03 PROCEDURE — 4040F PNEUMOC VAC/ADMIN/RCVD: CPT | Performed by: OTOLARYNGOLOGY

## 2020-02-03 PROCEDURE — G8399 PT W/DXA RESULTS DOCUMENT: HCPCS | Performed by: OTOLARYNGOLOGY

## 2020-02-03 PROCEDURE — 99214 OFFICE O/P EST MOD 30 MIN: CPT | Performed by: OTOLARYNGOLOGY

## 2020-02-03 PROCEDURE — 3017F COLORECTAL CA SCREEN DOC REV: CPT | Performed by: OTOLARYNGOLOGY

## 2020-02-03 PROCEDURE — G8482 FLU IMMUNIZE ORDER/ADMIN: HCPCS | Performed by: OTOLARYNGOLOGY

## 2020-02-03 PROCEDURE — 1036F TOBACCO NON-USER: CPT | Performed by: OTOLARYNGOLOGY

## 2020-02-03 PROCEDURE — 1123F ACP DISCUSS/DSCN MKR DOCD: CPT | Performed by: OTOLARYNGOLOGY

## 2020-02-03 RX ORDER — INSULIN ASPART 100 [IU]/ML
20 INJECTION, SOLUTION INTRAVENOUS; SUBCUTANEOUS 4 TIMES DAILY
Status: ON HOLD | COMMUNITY
Start: 2020-01-13 | End: 2020-02-28 | Stop reason: HOSPADM

## 2020-02-03 RX ORDER — POTASSIUM CHLORIDE 20 MEQ/1
TABLET, EXTENDED RELEASE ORAL
Status: ON HOLD | COMMUNITY
Start: 2020-01-13 | End: 2020-02-28 | Stop reason: HOSPADM

## 2020-02-03 ASSESSMENT — ENCOUNTER SYMPTOMS
EYE DISCHARGE: 0
NAUSEA: 0
VOICE CHANGE: 0
STRIDOR: 0
SHORTNESS OF BREATH: 0
EYE PAIN: 0
SINUS PRESSURE: 0
ABDOMINAL PAIN: 0
RHINORRHEA: 0

## 2020-02-03 NOTE — PROGRESS NOTES
and orders as documented by the resident. Patient here for follow up of medical problems. Remainder of medical problems as per resident note.       [address] [facility], DO  2/8/20

## 2020-02-04 ENCOUNTER — HOSPITAL ENCOUNTER (OUTPATIENT)
Dept: OTHER | Age: 75
Setting detail: THERAPIES SERIES
Discharge: HOME | End: 2020-02-04
Payer: MEDICARE

## 2020-02-04 VITALS
OXYGEN SATURATION: 97 % | BODY MASS INDEX: 40.39 KG/M2 | HEART RATE: 90 BPM | WEIGHT: 228 LBS | RESPIRATION RATE: 18 BRPM | SYSTOLIC BLOOD PRESSURE: 137 MMHG | DIASTOLIC BLOOD PRESSURE: 65 MMHG

## 2020-02-04 LAB
ANION GAP SERPL CALCULATED.3IONS-SCNC: 13 MMOL/L (ref 7–16)
BUN BLDV-MCNC: 70 MG/DL (ref 8–23)
CALCIUM SERPL-MCNC: 9.4 MG/DL (ref 8.6–10.2)
CHLORIDE BLD-SCNC: 102 MMOL/L (ref 98–107)
CO2: 26 MMOL/L (ref 22–29)
CREAT SERPL-MCNC: 3.3 MG/DL (ref 0.5–1)
GFR AFRICAN AMERICAN: 16
GFR NON-AFRICAN AMERICAN: 16 ML/MIN/1.73
GLUCOSE BLD-MCNC: 232 MG/DL (ref 74–99)
POTASSIUM SERPL-SCNC: 4.5 MMOL/L (ref 3.5–5)
PRO-BNP: 5057 PG/ML (ref 0–450)
SODIUM BLD-SCNC: 141 MMOL/L (ref 132–146)

## 2020-02-04 PROCEDURE — 83880 ASSAY OF NATRIURETIC PEPTIDE: CPT

## 2020-02-04 PROCEDURE — 80048 BASIC METABOLIC PNL TOTAL CA: CPT

## 2020-02-04 PROCEDURE — 99214 OFFICE O/P EST MOD 30 MIN: CPT

## 2020-02-04 PROCEDURE — 36415 COLL VENOUS BLD VENIPUNCTURE: CPT

## 2020-02-04 NOTE — PROGRESS NOTES
Pt down 4 pounds, breath sounds clear, trace to no edema noted BLE. Labs drawn and follow up appt. Made.

## 2020-02-20 ENCOUNTER — OFFICE VISIT (OUTPATIENT)
Dept: ORTHOPEDIC SURGERY | Age: 75
End: 2020-02-20
Payer: MEDICARE

## 2020-02-20 VITALS
TEMPERATURE: 98.1 F | SYSTOLIC BLOOD PRESSURE: 119 MMHG | WEIGHT: 238 LBS | HEART RATE: 74 BPM | HEIGHT: 64 IN | BODY MASS INDEX: 40.63 KG/M2 | DIASTOLIC BLOOD PRESSURE: 72 MMHG

## 2020-02-20 PROCEDURE — G8399 PT W/DXA RESULTS DOCUMENT: HCPCS | Performed by: ORTHOPAEDIC SURGERY

## 2020-02-20 PROCEDURE — G8427 DOCREV CUR MEDS BY ELIG CLIN: HCPCS | Performed by: ORTHOPAEDIC SURGERY

## 2020-02-20 PROCEDURE — 3017F COLORECTAL CA SCREEN DOC REV: CPT | Performed by: ORTHOPAEDIC SURGERY

## 2020-02-20 PROCEDURE — G8482 FLU IMMUNIZE ORDER/ADMIN: HCPCS | Performed by: ORTHOPAEDIC SURGERY

## 2020-02-20 PROCEDURE — 99213 OFFICE O/P EST LOW 20 MIN: CPT | Performed by: ORTHOPAEDIC SURGERY

## 2020-02-20 PROCEDURE — 1123F ACP DISCUSS/DSCN MKR DOCD: CPT | Performed by: ORTHOPAEDIC SURGERY

## 2020-02-20 PROCEDURE — G8417 CALC BMI ABV UP PARAM F/U: HCPCS | Performed by: ORTHOPAEDIC SURGERY

## 2020-02-20 PROCEDURE — 1090F PRES/ABSN URINE INCON ASSESS: CPT | Performed by: ORTHOPAEDIC SURGERY

## 2020-02-20 PROCEDURE — 20610 DRAIN/INJ JOINT/BURSA W/O US: CPT | Performed by: ORTHOPAEDIC SURGERY

## 2020-02-20 PROCEDURE — 1036F TOBACCO NON-USER: CPT | Performed by: ORTHOPAEDIC SURGERY

## 2020-02-20 PROCEDURE — 4040F PNEUMOC VAC/ADMIN/RCVD: CPT | Performed by: ORTHOPAEDIC SURGERY

## 2020-02-20 RX ORDER — BUPIVACAINE HYDROCHLORIDE 2.5 MG/ML
3 INJECTION, SOLUTION INFILTRATION; PERINEURAL ONCE
Status: COMPLETED | OUTPATIENT
Start: 2020-02-20 | End: 2020-02-20

## 2020-02-20 RX ORDER — TRIAMCINOLONE ACETONIDE 40 MG/ML
80 INJECTION, SUSPENSION INTRA-ARTICULAR; INTRAMUSCULAR ONCE
Status: COMPLETED | OUTPATIENT
Start: 2020-02-20 | End: 2020-02-20

## 2020-02-20 RX ADMIN — BUPIVACAINE HYDROCHLORIDE 7.5 MG: 2.5 INJECTION, SOLUTION INFILTRATION; PERINEURAL at 15:21

## 2020-02-20 RX ADMIN — TRIAMCINOLONE ACETONIDE 80 MG: 40 INJECTION, SUSPENSION INTRA-ARTICULAR; INTRAMUSCULAR at 15:21

## 2020-02-21 NOTE — PROGRESS NOTES
Chief Complaint:   Chief Complaint   Patient presents with    Knee Pain     Bilateral knee pain, requesting injections. Patient states after she received injections last visit she can walk around her house with walker. Using wheelchair today. Pain level 10/10. Vincent Dias presents with recurring bilateral knee pain, reports a couple of months of significant relief following previous injections. Knee pain is 1 of many reasons for her limited activities, she is ambulating at home with a walker and assistance of family. She is currently wheelchair bound in part due to knee pain. No fever chills sweats or other acute systemic illness, she has numerous and considerable medical comorbidities that limit her treatment alternatives, she requests repeat injection bilateral knees today. Allergies; medications; past medical, surgical, family, and social history; and problem list have been reviewed today and updated as indicated in this encounter seen below. Exam: Pleasant obese black female recumbent in wheelchair. Upper extremities are grossly intact leg lengths are equal hip motion is painless. Bilateral knees show significant varus deformities partially correctable, crepitus and pain with limited motion from 5 to 95 degrees of flexion, no acute effusion. Radiographs: Deferred today exam consistent with previous x-ray showing end-stage DJD varus bilateral knees. Yahir Stanley was seen today for knee pain.     Diagnoses and all orders for this visit:    Primary osteoarthritis of both knees  -     WA ARTHROCENTESIS ASPIR&/INJ MAJOR JT/BURSA W/O     Morbid obesity (HCC)    Chronic kidney disease, stage III (moderate) ([facility])    Permanent atrial fibrillation    Chronic heart failure with preserved ejection fraction (HFpEF) ([facility])    Other orders  -     triamcinolone acetonide (KENALOG-40) injection 80 mg  -     bupivacaine (MARCAINE) 0.25 % injection 7.5 mg       Limited treatment options were reviewed, at the patient's request I performed injection of Kenalog and Marcaine into bilateral knees to anterior medial approach is without difficulty or complication tolerated well. Low impact exercise to tolerance again advised with activity precautions against injury. Questions asked and answered follow-up in 4 months or as needed. - Counseling More Than 50% of the Appointment Time: 15 minutes    Return in about 4 months (around 6/20/2020), or if symptoms worsen or fail to improve.      Current Outpatient Medications   Medication Sig Dispense Refill    NOVOLOG FLEXPEN 100 UNIT/ML injection pen Inject 20 Units into the skin 4 times daily takes 18 units when BS is 100-150  takes 20 units when BS is 150-250      potassium chloride (KLOR-CON M) 20 MEQ extended release tablet       atorvastatin (LIPITOR) 40 MG tablet Take 1 tablet by mouth daily 30 tablet 5    levothyroxine (LEVOTHROID) 75 MCG tablet Take 1 tablet by mouth daily 90 tablet 5    apixaban (ELIQUIS) 5 MG TABS tablet Take 1 tablet by mouth 2 times daily 60 tablet 5    albuterol sulfate  (90 Base) MCG/ACT inhaler Inhale 2 puffs into the lungs every 6 hours as needed for Wheezing 3 Inhaler 4    famotidine (PEPCID) 20 MG tablet Take 1 tablet by mouth daily 60 tablet 5    meclizine (ANTIVERT) 12.5 MG tablet Take 1 tablet by mouth 3 times daily 90 tablet 2    potassium chloride (KLOR-CON) 20 MEQ packet Take 1 tablet by mouth twice daily 60 each 5    metOLazone (ZAROXOLYN) 2.5 MG tablet Take 1 tablet by mouth See Admin Instructions Mondays, Wednesdays, Fridays 30 tablet 3    bumetanide (BUMEX) 2 MG tablet Take 1 tablet by mouth 2 times daily 180 tablet 1    blood glucose test strips (FREESTYLE LITE) strip Use to test blood sugar four times a day & prn for hypoglycemia 150 each 5    FREESTYLE LANCETS MISC Use to test blood sugar four times a day & prn for hypoglycemia 200 each 5    glucose monitoring kit (FREESTYLE) monitoring kit 1 kit by Does tablet 4    loperamide (IMODIUM) 2 MG capsule Take 1 capsule by mouth 4 times daily as needed for Diarrhea (Patient not taking: Reported on 2/20/2020) 120 capsule 5     No current facility-administered medications for this visit.         Patient Active Problem List   Diagnosis    Permanent atrial fibrillation    HTN (hypertension)    Asthma    Hyperlipidemia    CAD (coronary artery disease)    Chronic kidney disease, stage III (moderate) ([facility])    Hypothyroidism    Anxiety    Gastroesophageal reflux disease    COPD (chronic obstructive pulmonary disease) ([address]    Vitamin D deficiency    Obstructive sleep apnea    Peripheral neuropathy    Generalized OA    LVH (left ventricular hypertrophy)    Pulmonary hypertension ([facility])    Valvular heart disease    Chronic heart failure with preserved ejection fraction (HFpEF) ([facility])    Morbid obesity ([facility])    Chronic diastolic congestive heart failure, NYHA class 3 ([facility])    Mitral regurgitation    Type 2 diabetes mellitus, with long-term current use of insulin ([facility])    Chronic anticoagulation    Nonrheumatic mitral valve regurgitation    Red blood cell antibody positive       Past Medical History:   Diagnosis Date    Adenoma of left adrenal gland     Adenoma of left adrenal gland     Dr Alyssa Aguirre at [facility] DARVIN (acute kidney injury) ([address]     Albuminuria     Anterior myocardial infarction ([address]     Anxiety 1/16/2017    Asthma     Atrial fibrillation ([facility])     CAD (coronary artery disease)     CKD (chronic kidney disease) stage 3, GFR 30-59 ml/min ([facility])     Congestive heart failure, NYHA class 3 and ACC/AHA stage C ([address] 1/16/2017    COPD (chronic obstructive pulmonary disease) ([address]     Dementia ([address]     patient denies, per Dr Franklin Bowman, will remove Dx    Diabetes mellitus ([address]     Diverticular disease     Diverticulitis     Dry eye     [facility] St[facility] Elevated plasma metanephrines     Esophagitis, [place] grade A 2013    Fecal incontinence 2017    Dr Huma Mcgee Fibromyalgia     Gastritis     Gastroesophageal reflux disease without esophagitis 2017    Generalized OA     GI bleed     Hiatal hernia     4 cm-Dr Huma Mcgee History of methicillin resistant Staphylococcus aureus infection 2011    Hyperlipidemia     Hyperplastic colon polyp     Hypertension     Hypokalemia     Hypothyroidism     Iron deficiency anemia     Left atrial dilation 2017    Lumbar herniated disc     LVH (left ventricular hypertrophy) 2017    Microalbuminuria 2012    STACEY (obstructive sleep apnea)     currently not using CPAP    Osteoporosis     Peripheral neuropathy     Post laminectomy syndrome     Primary osteoarthritis involving multiple joints 2017    Primary osteoarthritis of both knees 3/3/2017    Proteinuria     Pulmonary hypertension ([address] 2017    Syncope     Tobacco abuse     Valvular heart disease 2017    Dilated RV, thickened MV, mod-severe MR    Vitamin D deficiency        Past Surgical History:   Procedure Laterality Date    APPENDECTOMY      reportedly done at time of last  in      BACK SURGERY  07/2003    x2    [facility]    CARDIOVASCULAR STRESS TEST  , ,     CARPAL TUNNEL RELEASE Bilateral     done at [address] [place] [address]      x4    COLONOSCOPY  2014, 2008, 2003    [facility] Dr Kailee Valencia-hyperplastic polyp, sigmoid diverticula, external hemorrhoid-repeat 5 yrs (2019)    CORONARY ANGIOPLASTY WITH STENT PLACEMENT  2013    [facility]    CORONARY ARTERY BYPASS GRAFT      quadruple bypass @ [facility]  2016    LVH EF 45-50%    LUMBAR SPINE SURGERY      Dr. Florencia Medina in [address] [place] [address] ENDOSCOPY  5/20/15, 2013, 13    Dr Elier Blanco @ [facility]  2017     Tobar-GERD/HIATAL HERNIA/gastritis       Allergies   Allergen Reactions    Iv [Iodides] Anaphylaxis     uncertain    Codeine     Cymbalta [Duloxetine Hcl] Swelling    Gabapentin Swelling    Hydrocodone     Morphine     Pradaxa [Dabigatran Etexilate Mesylate] Other (See Comments)     Bleeding episode    Oxycontin [Oxycodone Hcl] Anxiety    Penicillins Rash       Social History     Socioeconomic History    Marital status:      Spouse name: None    Number of children: 3    Years of education: None    Highest education level: None   Occupational History    Occupation: retired- [employer]     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[place] [address] strain: Not very hard    Food insecurity:     Worry: None     Inability: None    Transportation needs:     Medical: None     Non-medical: None   Tobacco Use    Smoking status: Former Smoker     Packs/day: 0.50     Years: 55.00     Pack years: 27.50     Types: Cigarettes     Start date: 1961     Last attempt to quit: 2016     Years since quittin.1    Smokeless tobacco: Never Used   Substance and Sexual Activity    Alcohol use: Not Currently    Drug use: Never     Comment: denies    Sexual activity: Not Currently     Partners: Male   Lifestyle    Physical activity:     Days per week: None     Minutes per session: None    Stress: None   Relationships    Social connections:     Talks on phone: None     Gets together: None     Attends Bahai service: None     Active member of club or organization: None     Attends meetings of clubs or organizations: None     Relationship status: None    Intimate partner violence:     Fear of current or ex partner: None     Emotionally abused: None     Physically abused: None     Forced sexual activity: None   Other Topics Concern    None   Social History Narrative    She ambulates with a cane at times, and sometimes a rollator.      She had smoked for over 50 years, and smoked on average 1 pack every three days    She denies alcohol or illicit drug use     She lives with her daughter                 Moved from [place], [address] [place] [address] to [address] [place] [address] and lives with daughter Neil Tabares        Nurse Navigator with [employer] insurance (Jaden Easton RN) [phone] (phone) [phone] (fax)            She ambulates with a cane at times, and sometimes a rollator. She had smoked for over 50 years, and smoked on average 1 pack every three days    She denies alcohol or illicit drug use     She lives with her daughter             Family History: Mother  of breast cancer and was a diabetic    One sister with history of diabetes        Mother  of breast cancer and was a diabetic    One sister with history of diabetes            Drinks occ cup of coffee/[employer]       Family History   Problem Relation Age of Onset    Breast Cancer Mother     Diabetes Mother     Stroke Maternal Grandmother     Diabetes Maternal Grandmother     Diabetes Sister          Review of Systems  As follows except as previously noted in HPI:  Constitutional: Negative for chills, diaphoresis, fatigue, fever and unexpected weight change. Respiratory: Negative for cough, shortness of breath and wheezing. Cardiovascular: Negative for chest pain and palpitations. Neurological: Negative for dizziness, syncope, cephalgia. GI / : negative  Musculoskeletal: see HPI       Objective:   Physical Exam   Constitutional: Oriented to person, place, and time. and appears well-developed and well-nourished. :   Head: Normocephalic and atraumatic. Eyes: EOM are normal.   Neck: Neck supple. Cardiovascular: Normal rate and regular rhythm. Pulmonary/Chest: Effort normal. No stridor. No respiratory distress, no wheezes. Abdominal:  No abnormal distension. Neurological: Alert and oriented to person, place, and time. Skin: Skin is warm and dry. Psychiatric: Normal mood and affect.  Behavior is normal. Thought content normal.    2/21/2020  10:52 AM

## 2020-02-24 ENCOUNTER — TELEPHONE (OUTPATIENT)
Dept: PRIMARY CARE CLINIC | Age: 75
End: 2020-02-24

## 2020-02-24 NOTE — TELEPHONE ENCOUNTER
Anjelica Bojorquez called from [address] [place] [address] Drive, [address] stated that the patient had spoken to Dr. Reji Rivera about getting a raised toilet seat. Anjelica Bojorquez was calling to see if this was every prescribed. Order in chart from 11/26/2019. Patient stated she never received the order and that it was faxed somewhere and has never heard anything regarding this. Patient requesting status update.

## 2020-02-24 NOTE — TELEPHONE ENCOUNTER
Per Lauren Monae, should follow up with patient to see where she wanted to have this order sent. Called and spoke with patient but she did not know where she gets her equipment from, all she knows is that she gets it all delivered. Patient gave number for [employer]. Karthik at [phone] and left a voicemail for a return call.

## 2020-02-25 ENCOUNTER — HOSPITAL ENCOUNTER (INPATIENT)
Age: 75
LOS: 6 days | Discharge: HOME | DRG: 683 | End: 2020-03-02
Attending: EMERGENCY MEDICINE | Admitting: INTERNAL MEDICINE
Payer: MEDICARE

## 2020-02-25 ENCOUNTER — APPOINTMENT (OUTPATIENT)
Dept: GENERAL RADIOLOGY | Age: 75
DRG: 683 | End: 2020-02-25
Payer: MEDICARE

## 2020-02-25 ENCOUNTER — APPOINTMENT (OUTPATIENT)
Dept: ULTRASOUND IMAGING | Age: 75
DRG: 683 | End: 2020-02-25
Payer: MEDICARE

## 2020-02-25 PROBLEM — E66.9 OBESITY: Chronic | Status: ACTIVE | Noted: 2020-02-25

## 2020-02-25 PROBLEM — N18.4 CKD (CHRONIC KIDNEY DISEASE) STAGE 4, GFR 15-29 ML/MIN (HCC): Chronic | Status: ACTIVE | Noted: 2020-02-25

## 2020-02-25 PROBLEM — N17.9 AKI (ACUTE KIDNEY INJURY) (HCC): Status: ACTIVE | Noted: 2020-02-25

## 2020-02-25 PROBLEM — K21.9 GASTROESOPHAGEAL REFLUX DISEASE: Chronic | Status: ACTIVE | Noted: 2017-01-16

## 2020-02-25 PROBLEM — E66.01 MORBID OBESITY (HCC): Status: RESOLVED | Noted: 2018-06-21 | Resolved: 2020-02-25

## 2020-02-25 PROBLEM — N19 RENAL FAILURE: Status: ACTIVE | Noted: 2020-02-25

## 2020-02-25 PROBLEM — I50.32 CHRONIC HEART FAILURE WITH PRESERVED EJECTION FRACTION (HFPEF) (HCC): Chronic | Status: ACTIVE | Noted: 2017-01-16

## 2020-02-25 LAB
ADENOVIRUS BY PCR: NOT DETECTED
ALBUMIN SERPL-MCNC: 4 G/DL (ref 3.5–5.2)
ALP BLD-CCNC: 70 U/L (ref 35–104)
ALT SERPL-CCNC: 13 U/L (ref 0–32)
ANION GAP SERPL CALCULATED.3IONS-SCNC: 15 MMOL/L (ref 7–16)
AST SERPL-CCNC: 9 U/L (ref 0–31)
BASOPHILS ABSOLUTE: 0 E9/L (ref 0–0.2)
BASOPHILS RELATIVE PERCENT: 0 % (ref 0–2)
BETA-HYDROXYBUTYRATE: 0.1 MMOL/L (ref 0.02–0.27)
BILIRUB SERPL-MCNC: 0.5 MG/DL (ref 0–1.2)
BILIRUBIN URINE: NEGATIVE
BLOOD, URINE: NEGATIVE
BORDETELLA PARAPERTUSSIS BY PCR: NOT DETECTED
BORDETELLA PERTUSSIS BY PCR: NOT DETECTED
BUN BLDV-MCNC: 133 MG/DL (ref 8–23)
CALCIUM SERPL-MCNC: 9.4 MG/DL (ref 8.6–10.2)
CHLAMYDOPHILIA PNEUMONIAE BY PCR: NOT DETECTED
CHLORIDE BLD-SCNC: 91 MMOL/L (ref 98–107)
CHP ED QC CHECK: YES
CLARITY: CLEAR
CO2: 25 MMOL/L (ref 22–29)
COLOR: YELLOW
CORONAVIRUS 229E BY PCR: NOT DETECTED
CORONAVIRUS HKU1 BY PCR: NOT DETECTED
CORONAVIRUS NL63 BY PCR: NOT DETECTED
CORONAVIRUS OC43 BY PCR: NOT DETECTED
CREAT SERPL-MCNC: 3.4 MG/DL (ref 0.5–1)
EKG ATRIAL RATE: 375 BPM
EKG Q-T INTERVAL: 370 MS
EKG QRS DURATION: 84 MS
EKG QTC CALCULATION (BAZETT): 462 MS
EKG R AXIS: 13 DEGREES
EKG T AXIS: -141 DEGREES
EKG VENTRICULAR RATE: 94 BPM
EOSINOPHILS ABSOLUTE: 0 E9/L (ref 0.05–0.5)
EOSINOPHILS RELATIVE PERCENT: 0 % (ref 0–6)
GFR AFRICAN AMERICAN: 16
GFR NON-AFRICAN AMERICAN: 16 ML/MIN/1.73
GLUCOSE BLD-MCNC: 352 MG/DL
GLUCOSE BLD-MCNC: 458 MG/DL (ref 74–99)
GLUCOSE URINE: 500 MG/DL
HCT VFR BLD CALC: 37.7 % (ref 34–48)
HEMOGLOBIN: 11.8 G/DL (ref 11.5–15.5)
HUMAN METAPNEUMOVIRUS BY PCR: NOT DETECTED
HUMAN RHINOVIRUS/ENTEROVIRUS BY PCR: NOT DETECTED
IMMATURE GRANULOCYTES #: 0.08 E9/L
IMMATURE GRANULOCYTES %: 0.6 % (ref 0–5)
INFLUENZA A BY PCR: NOT DETECTED
INFLUENZA B BY PCR: NOT DETECTED
INR BLD: 1.7
KETONES, URINE: NEGATIVE MG/DL
LACTIC ACID: 2 MMOL/L (ref 0.5–2.2)
LEUKOCYTE ESTERASE, URINE: NEGATIVE
LYMPHOCYTES ABSOLUTE: 0.98 E9/L (ref 1.5–4)
LYMPHOCYTES RELATIVE PERCENT: 7.9 % (ref 20–42)
MCH RBC QN AUTO: 26.9 PG (ref 26–35)
MCHC RBC AUTO-ENTMCNC: 31.3 % (ref 32–34.5)
MCV RBC AUTO: 86.1 FL (ref 80–99.9)
METER GLUCOSE: 352 MG/DL (ref 74–99)
METER GLUCOSE: 422 MG/DL (ref 74–99)
METER GLUCOSE: 480 MG/DL (ref 74–99)
MONOCYTES ABSOLUTE: 0.92 E9/L (ref 0.1–0.95)
MONOCYTES RELATIVE PERCENT: 7.4 % (ref 2–12)
MYCOPLASMA PNEUMONIAE BY PCR: NOT DETECTED
NEUTROPHILS ABSOLUTE: 10.5 E9/L (ref 1.8–7.3)
NEUTROPHILS RELATIVE PERCENT: 84.1 % (ref 43–80)
NITRITE, URINE: NEGATIVE
PARAINFLUENZA VIRUS 1 BY PCR: NOT DETECTED
PARAINFLUENZA VIRUS 2 BY PCR: NOT DETECTED
PARAINFLUENZA VIRUS 3 BY PCR: NOT DETECTED
PARAINFLUENZA VIRUS 4 BY PCR: NOT DETECTED
PDW BLD-RTO: 14.9 FL (ref 11.5–15)
PH UA: 6 (ref 5–9)
PLATELET # BLD: 199 E9/L (ref 130–450)
PMV BLD AUTO: 11.1 FL (ref 7–12)
POTASSIUM SERPL-SCNC: 4.8 MMOL/L (ref 3.5–5)
PRO-BNP: 4540 PG/ML (ref 0–450)
PROTEIN UA: NEGATIVE MG/DL
PROTHROMBIN TIME: 18.9 SEC (ref 9.3–12.4)
RBC # BLD: 4.38 E12/L (ref 3.5–5.5)
RESPIRATORY SYNCYTIAL VIRUS BY PCR: NOT DETECTED
SODIUM BLD-SCNC: 131 MMOL/L (ref 132–146)
SPECIFIC GRAVITY UA: 1.01 (ref 1–1.03)
TOTAL CK: 60 U/L (ref 20–180)
TOTAL PROTEIN: 7.1 G/DL (ref 6.4–8.3)
TROPONIN: 0.03 NG/ML (ref 0–0.03)
UROBILINOGEN, URINE: 0.2 E.U./DL
WBC # BLD: 12.5 E9/L (ref 4.5–11.5)

## 2020-02-25 PROCEDURE — 99285 EMERGENCY DEPT VISIT HI MDM: CPT

## 2020-02-25 PROCEDURE — 76770 US EXAM ABDO BACK WALL COMP: CPT

## 2020-02-25 PROCEDURE — 2580000003 HC RX 258: Performed by: EMERGENCY MEDICINE

## 2020-02-25 PROCEDURE — 85025 COMPLETE CBC W/AUTO DIFF WBC: CPT

## 2020-02-25 PROCEDURE — 87088 URINE BACTERIA CULTURE: CPT

## 2020-02-25 PROCEDURE — 2580000003 HC RX 258: Performed by: INTERNAL MEDICINE

## 2020-02-25 PROCEDURE — 82010 KETONE BODYS QUAN: CPT

## 2020-02-25 PROCEDURE — 83880 ASSAY OF NATRIURETIC PEPTIDE: CPT

## 2020-02-25 PROCEDURE — 6370000000 HC RX 637 (ALT 250 FOR IP): Performed by: EMERGENCY MEDICINE

## 2020-02-25 PROCEDURE — 6370000000 HC RX 637 (ALT 250 FOR IP): Performed by: INTERNAL MEDICINE

## 2020-02-25 PROCEDURE — 93010 ELECTROCARDIOGRAM REPORT: CPT | Performed by: INTERNAL MEDICINE

## 2020-02-25 PROCEDURE — 82550 ASSAY OF CK (CPK): CPT

## 2020-02-25 PROCEDURE — 81003 URINALYSIS AUTO W/O SCOPE: CPT

## 2020-02-25 PROCEDURE — 36415 COLL VENOUS BLD VENIPUNCTURE: CPT

## 2020-02-25 PROCEDURE — 0100U HC RESPIRPTHGN MULT REV TRANS & AMP PRB TECH 21 TRGT: CPT

## 2020-02-25 PROCEDURE — 85610 PROTHROMBIN TIME: CPT

## 2020-02-25 PROCEDURE — 82962 GLUCOSE BLOOD TEST: CPT

## 2020-02-25 PROCEDURE — 93005 ELECTROCARDIOGRAM TRACING: CPT | Performed by: PHYSICIAN ASSISTANT

## 2020-02-25 PROCEDURE — 84484 ASSAY OF TROPONIN QUANT: CPT

## 2020-02-25 PROCEDURE — 2060000000 HC ICU INTERMEDIATE R&B

## 2020-02-25 PROCEDURE — 96374 THER/PROPH/DIAG INJ IV PUSH: CPT

## 2020-02-25 PROCEDURE — 71046 X-RAY EXAM CHEST 2 VIEWS: CPT

## 2020-02-25 PROCEDURE — 80053 COMPREHEN METABOLIC PANEL: CPT

## 2020-02-25 PROCEDURE — 83605 ASSAY OF LACTIC ACID: CPT

## 2020-02-25 PROCEDURE — 87040 BLOOD CULTURE FOR BACTERIA: CPT

## 2020-02-25 RX ORDER — CARVEDILOL 25 MG/1
25 TABLET ORAL 2 TIMES DAILY WITH MEALS
Status: DISCONTINUED | OUTPATIENT
Start: 2020-02-25 | End: 2020-03-02 | Stop reason: HOSPADM

## 2020-02-25 RX ORDER — SODIUM CHLORIDE 0.9 % (FLUSH) 0.9 %
10 SYRINGE (ML) INJECTION EVERY 12 HOURS SCHEDULED
Status: DISCONTINUED | OUTPATIENT
Start: 2020-02-25 | End: 2020-03-02 | Stop reason: HOSPADM

## 2020-02-25 RX ORDER — ONDANSETRON 2 MG/ML
4 INJECTION INTRAMUSCULAR; INTRAVENOUS EVERY 6 HOURS PRN
Status: DISCONTINUED | OUTPATIENT
Start: 2020-02-25 | End: 2020-03-02 | Stop reason: HOSPADM

## 2020-02-25 RX ORDER — INSULIN GLARGINE 100 [IU]/ML
60 INJECTION, SOLUTION SUBCUTANEOUS NIGHTLY
Status: DISCONTINUED | OUTPATIENT
Start: 2020-02-25 | End: 2020-02-26

## 2020-02-25 RX ORDER — FAMOTIDINE 20 MG/1
10 TABLET, FILM COATED ORAL DAILY
Status: DISCONTINUED | OUTPATIENT
Start: 2020-02-25 | End: 2020-03-02 | Stop reason: HOSPADM

## 2020-02-25 RX ORDER — HYDRALAZINE HYDROCHLORIDE 50 MG/1
100 TABLET, FILM COATED ORAL 3 TIMES DAILY
Status: DISCONTINUED | OUTPATIENT
Start: 2020-02-25 | End: 2020-02-27

## 2020-02-25 RX ORDER — DEXTROSE MONOHYDRATE 50 MG/ML
100 INJECTION, SOLUTION INTRAVENOUS PRN
Status: DISCONTINUED | OUTPATIENT
Start: 2020-02-25 | End: 2020-03-02 | Stop reason: HOSPADM

## 2020-02-25 RX ORDER — DEXTROSE MONOHYDRATE 25 G/50ML
12.5 INJECTION, SOLUTION INTRAVENOUS PRN
Status: DISCONTINUED | OUTPATIENT
Start: 2020-02-25 | End: 2020-03-02 | Stop reason: HOSPADM

## 2020-02-25 RX ORDER — INSULIN GLARGINE 100 [IU]/ML
10 INJECTION, SOLUTION SUBCUTANEOUS ONCE
Status: COMPLETED | OUTPATIENT
Start: 2020-02-25 | End: 2020-02-25

## 2020-02-25 RX ORDER — FLUTICASONE PROPIONATE 50 MCG
2 SPRAY, SUSPENSION (ML) NASAL EVERY MORNING
Status: DISCONTINUED | OUTPATIENT
Start: 2020-02-26 | End: 2020-03-02 | Stop reason: HOSPADM

## 2020-02-25 RX ORDER — 0.9 % SODIUM CHLORIDE 0.9 %
1000 INTRAVENOUS SOLUTION INTRAVENOUS ONCE
Status: COMPLETED | OUTPATIENT
Start: 2020-02-25 | End: 2020-02-25

## 2020-02-25 RX ORDER — ASPIRIN 81 MG/1
81 TABLET, CHEWABLE ORAL EVERY MORNING
Status: DISCONTINUED | OUTPATIENT
Start: 2020-02-26 | End: 2020-03-02 | Stop reason: HOSPADM

## 2020-02-25 RX ORDER — LEVOTHYROXINE SODIUM 0.07 MG/1
75 TABLET ORAL DAILY
Status: DISCONTINUED | OUTPATIENT
Start: 2020-02-25 | End: 2020-03-02 | Stop reason: HOSPADM

## 2020-02-25 RX ORDER — SODIUM CHLORIDE 0.9 % (FLUSH) 0.9 %
10 SYRINGE (ML) INJECTION PRN
Status: DISCONTINUED | OUTPATIENT
Start: 2020-02-25 | End: 2020-03-02 | Stop reason: HOSPADM

## 2020-02-25 RX ORDER — ATORVASTATIN CALCIUM 40 MG/1
40 TABLET, FILM COATED ORAL DAILY
Status: DISCONTINUED | OUTPATIENT
Start: 2020-02-25 | End: 2020-03-02 | Stop reason: HOSPADM

## 2020-02-25 RX ORDER — SODIUM CHLORIDE 9 MG/ML
INJECTION, SOLUTION INTRAVENOUS CONTINUOUS
Status: DISCONTINUED | OUTPATIENT
Start: 2020-02-25 | End: 2020-03-02

## 2020-02-25 RX ORDER — ALBUTEROL SULFATE 90 UG/1
2 AEROSOL, METERED RESPIRATORY (INHALATION) EVERY 6 HOURS PRN
Status: DISCONTINUED | OUTPATIENT
Start: 2020-02-25 | End: 2020-03-02 | Stop reason: HOSPADM

## 2020-02-25 RX ORDER — NICOTINE POLACRILEX 4 MG
15 LOZENGE BUCCAL PRN
Status: DISCONTINUED | OUTPATIENT
Start: 2020-02-25 | End: 2020-03-02 | Stop reason: HOSPADM

## 2020-02-25 RX ORDER — POLYETHYLENE GLYCOL 3350 17 G/17G
17 POWDER, FOR SOLUTION ORAL DAILY PRN
Status: DISCONTINUED | OUTPATIENT
Start: 2020-02-25 | End: 2020-03-02 | Stop reason: HOSPADM

## 2020-02-25 RX ORDER — ACETAMINOPHEN 325 MG/1
650 TABLET ORAL EVERY 4 HOURS PRN
Status: DISCONTINUED | OUTPATIENT
Start: 2020-02-25 | End: 2020-03-02 | Stop reason: HOSPADM

## 2020-02-25 RX ORDER — IPRATROPIUM BROMIDE AND ALBUTEROL SULFATE 2.5; .5 MG/3ML; MG/3ML
1 SOLUTION RESPIRATORY (INHALATION)
Status: DISCONTINUED | OUTPATIENT
Start: 2020-02-25 | End: 2020-03-02 | Stop reason: HOSPADM

## 2020-02-25 RX ADMIN — CARVEDILOL 25 MG: 25 TABLET, FILM COATED ORAL at 21:12

## 2020-02-25 RX ADMIN — HYDRALAZINE HYDROCHLORIDE 100 MG: 50 TABLET, FILM COATED ORAL at 21:17

## 2020-02-25 RX ADMIN — INSULIN LISPRO 6 UNITS: 100 INJECTION, SOLUTION INTRAVENOUS; SUBCUTANEOUS at 21:22

## 2020-02-25 RX ADMIN — INSULIN GLARGINE 10 UNITS: 100 INJECTION, SOLUTION SUBCUTANEOUS at 21:59

## 2020-02-25 RX ADMIN — SODIUM CHLORIDE: 9 INJECTION, SOLUTION INTRAVENOUS at 19:51

## 2020-02-25 RX ADMIN — FAMOTIDINE 10 MG: 20 TABLET ORAL at 21:17

## 2020-02-25 RX ADMIN — APIXABAN 5 MG: 5 TABLET, FILM COATED ORAL at 21:17

## 2020-02-25 RX ADMIN — INSULIN HUMAN 10 UNITS: 100 INJECTION, SOLUTION PARENTERAL at 15:13

## 2020-02-25 RX ADMIN — ACETAMINOPHEN 650 MG: 325 TABLET ORAL at 22:01

## 2020-02-25 RX ADMIN — ATORVASTATIN CALCIUM 40 MG: 40 TABLET, FILM COATED ORAL at 21:13

## 2020-02-25 RX ADMIN — SODIUM CHLORIDE 1000 ML: 9 INJECTION, SOLUTION INTRAVENOUS at 15:15

## 2020-02-25 RX ADMIN — INSULIN GLARGINE 60 UNITS: 100 INJECTION, SOLUTION SUBCUTANEOUS at 21:18

## 2020-02-25 RX ADMIN — INSULIN LISPRO 15 UNITS: 100 INJECTION, SOLUTION INTRAVENOUS; SUBCUTANEOUS at 21:18

## 2020-02-25 ASSESSMENT — PAIN DESCRIPTION - LOCATION: LOCATION: ARM;KNEE;HEAD

## 2020-02-25 ASSESSMENT — ENCOUNTER SYMPTOMS
SINUS PRESSURE: 0
DIARRHEA: 0
ABDOMINAL DISTENTION: 0
VOMITING: 0
WHEEZING: 0
EYE REDNESS: 0
COUGH: 0
EYE PAIN: 0
BACK PAIN: 0
SORE THROAT: 0
EYE DISCHARGE: 0
SHORTNESS OF BREATH: 0
NAUSEA: 0

## 2020-02-25 ASSESSMENT — PAIN DESCRIPTION - ORIENTATION: ORIENTATION: RIGHT;LEFT

## 2020-02-25 ASSESSMENT — PAIN DESCRIPTION - PAIN TYPE: TYPE: CHRONIC PAIN

## 2020-02-25 ASSESSMENT — PAIN DESCRIPTION - FREQUENCY: FREQUENCY: INTERMITTENT

## 2020-02-25 ASSESSMENT — PAIN SCALES - GENERAL
PAINLEVEL_OUTOF10: 7
PAINLEVEL_OUTOF10: 3

## 2020-02-25 ASSESSMENT — PAIN DESCRIPTION - DESCRIPTORS: DESCRIPTORS: ACHING

## 2020-02-25 NOTE — ED PROVIDER NOTES
EKG:  This EKG is signed and interpreted by me. Rate: 94  Rhythm: Atrial fibrillation  Interpretation: Atrial fibrillation, no acute ST elevations or depressions, adequate R wave progression, normal axis  Comparison: No acute changes and compared to most recent EKG.      --------------------------------------------- PAST HISTORY ---------------------------------------------  Past Medical History:  has a past medical history of Adenoma of left adrenal gland, Adenoma of left adrenal gland, DARVIN (acute kidney injury) ([facility] Albuminuria, Anterior myocardial infarction ([facility] Anxiety, Asthma, Atrial fibrillation ([facility]), CAD (coronary artery disease), CKD (chronic kidney disease) stage 3, GFR 30-59 ml/min ([facility]), Congestive heart failure, NYHA class 3 and ACC/AHA stage C ([facility]), COPD (chronic obstructive pulmonary disease) ([facility] Dementia ([facility] Diabetes mellitus ([facility] Diverticular disease, Diverticulitis, Dry eye, Elevated plasma metanephrines, Esophagitis, [place] grade A, Fecal incontinence, Fibromyalgia, Gastritis, Gastroesophageal reflux disease without esophagitis, Generalized OA, GI bleed, Hiatal hernia, History of methicillin resistant Staphylococcus aureus infection, Hyperlipidemia, Hyperplastic colon polyp, Hypertension, Hypokalemia, Hypothyroidism, Iron deficiency anemia, Left atrial dilation, Lumbar herniated disc, LVH (left ventricular hypertrophy), Microalbuminuria, STACEY (obstructive sleep apnea), Osteoporosis, Peripheral neuropathy, Post laminectomy syndrome, Primary osteoarthritis involving multiple joints, Primary osteoarthritis of both knees, Proteinuria, Pulmonary hypertension ([address] Syncope, Tobacco abuse, Valvular heart disease, and Vitamin D deficiency. Past Surgical History:  has a past surgical history that includes Carpal tunnel release (Bilateral); Coronary angioplasty with stent (2013); Coronary artery bypass graft ();  section;  Appendectomy; Lumbar spine surgery (); E12/L    Hemoglobin 11.8 11.5 - 15.5 g/dL    Hematocrit 37.7 34.0 - 48.0 %    MCV 86.1 80.0 - 99.9 fL    MCH 26.9 26.0 - 35.0 pg    MCHC 31.3 (L) 32.0 - 34.5 %    RDW 14.9 11.5 - 15.0 fL    Platelets 022 424 - 841 E9/L    MPV 11.1 7.0 - 12.0 fL    Neutrophils % 84.1 (H) 43.0 - 80.0 %    Immature Granulocytes % 0.6 0.0 - 5.0 %    Lymphocytes % 7.9 (L) 20.0 - 42.0 %    Monocytes % 7.4 2.0 - 12.0 %    Eosinophils % 0.0 0.0 - 6.0 %    Basophils % 0.0 0.0 - 2.0 %    Neutrophils Absolute 10.50 (H) 1.80 - 7.30 E9/L    Immature Granulocytes # 0.08 E9/L    Lymphocytes Absolute 0.98 (L) 1.50 - 4.00 E9/L    Monocytes Absolute 0.92 0.10 - 0.95 E9/L    Eosinophils Absolute 0.00 (L) 0.05 - 0.50 E9/L    Basophils Absolute 0.00 0.00 - 0.20 E9/L   Troponin   Result Value Ref Range    Troponin 0.03 0.00 - 0.03 ng/mL   Urinalysis   Result Value Ref Range    Color, UA Yellow Straw/Yellow    Clarity, UA Clear Clear    Glucose, Ur 500 (A) Negative mg/dL    Bilirubin Urine Negative Negative    Ketones, Urine Negative Negative mg/dL    Specific Gravity, UA 1.015 1.005 - 1.030    Blood, Urine Negative Negative    pH, UA 6.0 5.0 - 9.0    Protein, UA Negative Negative mg/dL    Urobilinogen, Urine 0.2 <2.0 E.U./dL    Nitrite, Urine Negative Negative    Leukocyte Esterase, Urine Negative Negative   Protime-INR   Result Value Ref Range    Protime 18.9 (H) 9.3 - 12.4 sec    INR 1.7    Lactic Acid, Plasma   Result Value Ref Range    Lactic Acid 2.0 0.5 - 2.2 mmol/L   Brain Natriuretic Peptide   Result Value Ref Range    Pro-BNP 4,540 (H) 0 - 450 pg/mL   CK   Result Value Ref Range    Total CK 60 20 - 180 U/L   POCT Glucose   Result Value Ref Range    Meter Glucose 422 (H) 74 - 99 mg/dL       RADIOLOGY:  Xr Chest Standard (2 Vw)    Result Date: 2020  Patient MRN: [id number] : 1945 Age:  76 years Gender: Female Order Date: 2020 12:15 PM Exam: XR CHEST (2 VW) Number of Images: 2 view Indication:   SOB SOB Comparison: 2019

## 2020-02-25 NOTE — ED NOTES
FIRST PROVIDER CONTACT ASSESSMENT NOTE      Department of Emergency Medicine   ED  First Provider Note   2/25/20  12:12 PM    Chief Complaint: Hyperglycemia (type two diabetic. Readings have been high for a week per pt. Between 400-600 per pt.)      History of Present Illness: Zelalem Reyes is a 76 y.o. female who presents to the ED by private car for hyperglycemia. Patient is a type II diabetic and readings have been extremely high and she is very concerned. Patient states over the last few days he has had associated shortness of breath more so which is very concerning to her. No chest pain. Focused Screening Exam:  Constitutional:  Alert, appears stated age and is in no distress. *ALLERGIES*     Iv [iodides]; Codeine; Cymbalta [duloxetine hcl]; Gabapentin; Hydrocodone; Morphine; Pradaxa [dabigatran etexilate mesylate];  Oxycontin [oxycodone hcl]; and Penicillins     ED Triage Vitals   BP Temp Temp src Pulse Resp SpO2 Height Weight   02/25/20 1207 02/25/20 1207 -- 02/25/20 1153 02/25/20 1207 02/25/20 1153 02/25/20 1207 02/25/20 1207   121/87 97.7 °F (36.5 °C)  87 18 99 % 5' 3.5\" (1.613 m) 228 lb (103.4 kg)        Initial Plan of Care:  Initiate Treatment-Testing, Proceed toTreatment Area When Bed Available for ED Attending/MLP to Continue Care    -----------------END OF FIRST PROVIDER CONTACT ASSESSMENT NOTE--------------  Electronically signed by Carmita Phan PA-C   DD: 2/25/20       Carmita Phan PA-C  02/25/20 1212

## 2020-02-25 NOTE — CONSULTS
[facility])     patient denies, per Dr Durward Goldmann, will remove Dx    Diabetes mellitus ([address]     Diverticular disease     Diverticulitis     Dry eye     follows [facility] Sarah [facility] Elevated plasma metanephrines     [facility]    Fecal incontinence 5/9/2017    Dr Porsche Pedroza Fibromyalgia     Gastritis     Gastroesophageal reflux disease without esophagitis 1/16/2017    Generalized OA     GI bleed     Hiatal hernia     4 cm-Dr Posrche Pedroza History of methicillin resistant Staphylococcus aureus infection 12/1/2011    Hyperlipidemia     Hyperplastic colon polyp 2008    Hypertension     Hypokalemia     Hypothyroidism     Iron deficiency anemia     Left atrial dilation 2/14/2017    Lumbar herniated disc     LVH (left ventricular hypertrophy) 1/16/2017    Microalbuminuria 4/22/2012    STACEY (obstructive sleep apnea)     currently not using CPAP    Osteoporosis     Peripheral neuropathy     Post laminectomy syndrome     Primary osteoarthritis involving multiple joints 5/11/2017    Primary osteoarthritis of both knees 3/3/2017    Proteinuria     Pulmonary hypertension ([address] 1/16/2017    Syncope     Tobacco abuse     Valvular heart disease 1/16/2017    Dilated RV, thickened MV, mod-severe MR    Vitamin D deficiency        Patient Active Problem List   Diagnosis    Permanent atrial fibrillation    HTN (hypertension)    Asthma    Hyperlipidemia    CAD (coronary artery disease)    Hypothyroidism    Anxiety    Gastroesophageal reflux disease    COPD (chronic obstructive pulmonary disease) ([address]    Vitamin D deficiency    Obstructive sleep apnea    Peripheral neuropathy    Generalized OA    LVH (left ventricular hypertrophy)    Pulmonary hypertension (HCC)    Valvular heart disease    Chronic heart failure with preserved ejection fraction (HFpEF) (HCC)    Chronic diastolic congestive heart failure, NYHA class 3 (HCC)    Mitral regurgitation    Type 2 diabetes mellitus, with long-term current use of insulin ([facility])    Chronic anticoagulation    Nonrheumatic mitral valve regurgitation    Red blood cell antibody positive    Renal failure    CKD (chronic kidney disease) stage 4, GFR 15-29 ml/min ([facility])    DAR[facility]IN (acute kidney injury) ([facility]    Obesity       Meds:            Meds prn:         Meds prior to admission:     No current facility-administered medications on file prior to encounter. Current Outpatient Medications on File Prior to Encounter   Medication Sig Dispense Refill    NOVOLOG FLEXPEN 100 UNIT/ML injection pen Inject 20 Units into the skin 4 times daily takes 18 units when BS is 100-150  takes 20 units when BS is 150-250      potassium chloride (KLOR-CON M) 20 MEQ extended release tablet       atorvastatin (LIPITOR) 40 MG tablet Take 1 tablet by mouth daily 30 tablet 5    levothyroxine (LEVOTHROID) 75 MCG tablet Take 1 tablet by mouth daily 90 tablet 5    apixaban (ELIQUIS) 5 MG TABS tablet Take 1 tablet by mouth 2 times daily 60 tablet 5    albuterol sulfate  (90 Base) MCG/ACT inhaler Inhale 2 puffs into the lungs every 6 hours as needed for Wheezing 3 Inhaler 4    famotidine (PEPCID) 20 MG tablet Take 1 tablet by mouth daily 60 tablet 5    meclizine (ANTIVERT) 12.5 MG tablet Take 1 tablet by mouth 3 times daily 90 tablet 2    nitroGLYCERIN (NITROSTAT) 0.4 MG SL tablet Place 1 tablet under the tongue every 5 minutes as needed for Chest pain up to max of 3 total doses. If no relief after 1 dose, call 911.  (Patient not taking: Reported on 2/20/2020) 75 tablet 4    potassium chloride (KLOR-CON) 20 MEQ packet Take 1 tablet by mouth twice daily 60 each 5    metOLazone (ZAROXOLYN) 2.5 MG tablet Take 1 tablet by mouth See Admin Instructions Mondays, Wednesdays, Fridays 30 tablet 3    bumetanide (BUMEX) 2 MG tablet Take 1 tablet by mouth 2 times daily 180 tablet 1    blood glucose test strips (FREESTYLE LITE) strip Use to test blood sugar four times a day & prn for hypoglycemia 150 each 5    FREESTYLE LANCETS MISC Use to test blood sugar four times a day & prn for hypoglycemia 200 each 5    glucose monitoring kit (FREESTYLE) monitoring kit 1 kit by Does not apply route daily 1 kit 0    hydrALAZINE (APRESOLINE) 100 MG tablet Take 1 tablet by mouth 3 times daily 270 tablet 3    insulin lispro (HUMALOG) 100 UNIT/ML injection vial Inject 20 Units into the skin 3 times daily (before meals)      magnesium hydroxide (MILK OF MAGNESIA) 400 MG/5ML suspension Take 30 mLs by mouth daily as needed for Constipation      acetaminophen (TYLENOL) 325 MG tablet Take 2 tablets by mouth every 4 hours as needed for Fever (For temp greater than 100.5 F (38 C)) 120 tablet 3    ipratropium-albuterol (DUONEB) 0.5-2.5 (3) MG/3ML SOLN nebulizer solution Inhale 3 mLs into the lungs every 4 hours (while awake) 360 mL 0    insulin glargine (BASAGLAR KWIKPEN) 100 UNIT/ML injection pen Inject 55 Units into the skin nightly 6 pen 5    Insulin Pen Needle (BD PEN NEEDLE IMELDA U/F) 32G X 4 MM MISC 1 each by Does not apply route 5 times daily (Patient taking differently: 1 each by Does not apply route 4 times daily Novolog) 300 each 4    blood glucose test strips (ASCENSIA AUTODISC VI;ONE TOUCH ULTRA TEST VI) strip 1 each by In Vitro route daily As needed.  100 each 3    Alcohol Swabs (ALCOHOL PREP) PADS 1 applicator by Does not apply route three times daily 100 each 1    Lancets MISC Uses 4 time a day with blood sugar check 300 each 5    carvedilol (COREG) 25 MG tablet Take 1 tablet by mouth 2 times daily (with meals) 60 tablet 5    fluticasone (FLONASE) 50 MCG/ACT nasal spray 2 sprays by Nasal route every morning 1 Bottle 5    lisinopril (PRINIVIL;ZESTRIL) 20 MG tablet Take 1 tablet by mouth 2 times daily 60 tablet 5    loperamide (IMODIUM) 2 MG capsule Take 1 capsule by mouth 4 times daily as needed for Diarrhea (Patient not taking: Reported on 2/20/2020)

## 2020-02-25 NOTE — ED NOTES
Stool negative for occult blood. Dr. Kenrick Roberto aware of same.      Carolina Perkins, RN  02/25/20 1804

## 2020-02-26 LAB
ANION GAP SERPL CALCULATED.3IONS-SCNC: 17 MMOL/L (ref 7–16)
BUN BLDV-MCNC: 130 MG/DL (ref 8–23)
CALCIUM SERPL-MCNC: 9.1 MG/DL (ref 8.6–10.2)
CHLORIDE BLD-SCNC: 98 MMOL/L (ref 98–107)
CO2: 23 MMOL/L (ref 22–29)
CREAT SERPL-MCNC: 3.2 MG/DL (ref 0.5–1)
GFR AFRICAN AMERICAN: 17
GFR NON-AFRICAN AMERICAN: 17 ML/MIN/1.73
GLUCOSE BLD-MCNC: 261 MG/DL (ref 74–99)
METER GLUCOSE: 141 MG/DL (ref 74–99)
METER GLUCOSE: 181 MG/DL (ref 74–99)
METER GLUCOSE: 187 MG/DL (ref 74–99)
METER GLUCOSE: 222 MG/DL (ref 74–99)
METER GLUCOSE: 241 MG/DL (ref 74–99)
METER GLUCOSE: 248 MG/DL (ref 74–99)
PH VENOUS: 7.4 (ref 7.35–7.45)
POTASSIUM SERPL-SCNC: 4.7 MMOL/L (ref 3.5–5)
SODIUM BLD-SCNC: 138 MMOL/L (ref 132–146)
URINE CULTURE, ROUTINE: NORMAL

## 2020-02-26 PROCEDURE — 6370000000 HC RX 637 (ALT 250 FOR IP): Performed by: INTERNAL MEDICINE

## 2020-02-26 PROCEDURE — 84300 ASSAY OF URINE SODIUM: CPT

## 2020-02-26 PROCEDURE — 2580000003 HC RX 258: Performed by: INTERNAL MEDICINE

## 2020-02-26 PROCEDURE — 82962 GLUCOSE BLOOD TEST: CPT

## 2020-02-26 PROCEDURE — 84133 ASSAY OF URINE POTASSIUM: CPT

## 2020-02-26 PROCEDURE — 97162 PT EVAL MOD COMPLEX 30 MIN: CPT

## 2020-02-26 PROCEDURE — 6360000002 HC RX W HCPCS: Performed by: INTERNAL MEDICINE

## 2020-02-26 PROCEDURE — 97165 OT EVAL LOW COMPLEX 30 MIN: CPT

## 2020-02-26 PROCEDURE — 97535 SELF CARE MNGMENT TRAINING: CPT

## 2020-02-26 PROCEDURE — 82800 BLOOD PH: CPT

## 2020-02-26 PROCEDURE — 82436 ASSAY OF URINE CHLORIDE: CPT

## 2020-02-26 PROCEDURE — 97530 THERAPEUTIC ACTIVITIES: CPT

## 2020-02-26 PROCEDURE — 82570 ASSAY OF URINE CREATININE: CPT

## 2020-02-26 PROCEDURE — 36415 COLL VENOUS BLD VENIPUNCTURE: CPT

## 2020-02-26 PROCEDURE — 2060000000 HC ICU INTERMEDIATE R&B

## 2020-02-26 PROCEDURE — 80048 BASIC METABOLIC PNL TOTAL CA: CPT

## 2020-02-26 PROCEDURE — 94640 AIRWAY INHALATION TREATMENT: CPT

## 2020-02-26 RX ORDER — INSULIN GLARGINE 100 [IU]/ML
70 INJECTION, SOLUTION SUBCUTANEOUS NIGHTLY
Status: DISCONTINUED | OUTPATIENT
Start: 2020-02-26 | End: 2020-02-27

## 2020-02-26 RX ORDER — LISINOPRIL 20 MG/1
20 TABLET ORAL 2 TIMES DAILY
Qty: 60 TABLET | Refills: 5 | Status: SHIPPED
Start: 2020-02-26 | End: 2020-02-28 | Stop reason: HOSPADM

## 2020-02-26 RX ADMIN — IPRATROPIUM BROMIDE AND ALBUTEROL SULFATE 3 ML: 2.5; .5 SOLUTION RESPIRATORY (INHALATION) at 13:56

## 2020-02-26 RX ADMIN — INSULIN LISPRO 4 UNITS: 100 INJECTION, SOLUTION INTRAVENOUS; SUBCUTANEOUS at 12:24

## 2020-02-26 RX ADMIN — HYDRALAZINE HYDROCHLORIDE 100 MG: 50 TABLET, FILM COATED ORAL at 20:49

## 2020-02-26 RX ADMIN — FLUTICASONE PROPIONATE 2 SPRAY: 50 SPRAY, METERED NASAL at 09:02

## 2020-02-26 RX ADMIN — ATORVASTATIN CALCIUM 40 MG: 40 TABLET, FILM COATED ORAL at 08:57

## 2020-02-26 RX ADMIN — INSULIN LISPRO 15 UNITS: 100 INJECTION, SOLUTION INTRAVENOUS; SUBCUTANEOUS at 12:24

## 2020-02-26 RX ADMIN — ONDANSETRON 4 MG: 2 INJECTION INTRAMUSCULAR; INTRAVENOUS at 10:43

## 2020-02-26 RX ADMIN — INSULIN LISPRO 2 UNITS: 100 INJECTION, SOLUTION INTRAVENOUS; SUBCUTANEOUS at 08:59

## 2020-02-26 RX ADMIN — APIXABAN 5 MG: 5 TABLET, FILM COATED ORAL at 20:49

## 2020-02-26 RX ADMIN — INSULIN LISPRO 2 UNITS: 100 INJECTION, SOLUTION INTRAVENOUS; SUBCUTANEOUS at 17:37

## 2020-02-26 RX ADMIN — INSULIN GLARGINE 70 UNITS: 100 INJECTION, SOLUTION SUBCUTANEOUS at 20:50

## 2020-02-26 RX ADMIN — HYDRALAZINE HYDROCHLORIDE 100 MG: 50 TABLET, FILM COATED ORAL at 15:18

## 2020-02-26 RX ADMIN — FAMOTIDINE 10 MG: 20 TABLET ORAL at 08:57

## 2020-02-26 RX ADMIN — IPRATROPIUM BROMIDE AND ALBUTEROL SULFATE 3 ML: 2.5; .5 SOLUTION RESPIRATORY (INHALATION) at 09:39

## 2020-02-26 RX ADMIN — IPRATROPIUM BROMIDE AND ALBUTEROL SULFATE 3 ML: 2.5; .5 SOLUTION RESPIRATORY (INHALATION) at 21:05

## 2020-02-26 RX ADMIN — SODIUM CHLORIDE, PRESERVATIVE FREE 10 ML: 5 INJECTION INTRAVENOUS at 09:02

## 2020-02-26 RX ADMIN — CARVEDILOL 25 MG: 25 TABLET, FILM COATED ORAL at 17:36

## 2020-02-26 RX ADMIN — INSULIN LISPRO 1 UNITS: 100 INJECTION, SOLUTION INTRAVENOUS; SUBCUTANEOUS at 20:51

## 2020-02-26 RX ADMIN — IPRATROPIUM BROMIDE AND ALBUTEROL SULFATE 3 ML: 2.5; .5 SOLUTION RESPIRATORY (INHALATION) at 17:24

## 2020-02-26 RX ADMIN — LEVOTHYROXINE SODIUM 75 MCG: 0.07 TABLET ORAL at 08:57

## 2020-02-26 RX ADMIN — HYDRALAZINE HYDROCHLORIDE 100 MG: 50 TABLET, FILM COATED ORAL at 09:06

## 2020-02-26 RX ADMIN — INSULIN LISPRO 15 UNITS: 100 INJECTION, SOLUTION INTRAVENOUS; SUBCUTANEOUS at 17:38

## 2020-02-26 RX ADMIN — APIXABAN 5 MG: 5 TABLET, FILM COATED ORAL at 08:57

## 2020-02-26 RX ADMIN — INSULIN LISPRO 15 UNITS: 100 INJECTION, SOLUTION INTRAVENOUS; SUBCUTANEOUS at 06:18

## 2020-02-26 RX ADMIN — ASPIRIN 81 MG 81 MG: 81 TABLET ORAL at 08:57

## 2020-02-26 RX ADMIN — ACETAMINOPHEN 650 MG: 325 TABLET ORAL at 21:51

## 2020-02-26 RX ADMIN — CARVEDILOL 25 MG: 25 TABLET, FILM COATED ORAL at 08:57

## 2020-02-26 ASSESSMENT — PAIN SCALES - GENERAL
PAINLEVEL_OUTOF10: 8
PAINLEVEL_OUTOF10: 0

## 2020-02-26 ASSESSMENT — PAIN SCALES - WONG BAKER: WONGBAKER_NUMERICALRESPONSE: 0

## 2020-02-26 NOTE — PROGRESS NOTES
noted in B ankles/feet    Patient education  Pt educated on purpose of PT assessment, importance of mobility, safety with mobility, hand placement with transfers, posture in seated/standing, gait    Patient response to education:   Pt verbalized understanding Pt demonstrated skill Pt requires further education in this area   Yes  Partially with verbal cues and assist Yes      ASSESSMENT:    Comments:  Patient cleared by RN and agreeable to treatment. Patient found in high [employer] as she just finished breakfast. Patient assisted to seated EOB and required assist with linens and scooting to the EOB. Patient reported moderate/severe dizziness with positional change. Patient reported chronic dizziness for the past 5 years. Has had vestibular rehab with no success. Patient reported R foot drop with AFO use but able to clear foot for brief gait assessment. Patient sat EOB x 15 minutes in an attempt to resolve dizziness. Patient leaning on the walker to stabilize self in seated. Patient with increased HA pain and increased anxiety while seated. Breathing became labored and SpO2 monitored and maintained at 94% or better, with . Distraction tactics attempted with no success. Patient assisted to standing by pushing walker up against the wall to brace and standing as she has a Rollator and this is her technique to stand. Verbal cues given for safety after the transfer with fair understanding. Patient with moderate forward flexed posture due to abdominal pain and not able to achieve erect posture. Patient with short, shuffled side steps to the [facility] and returned self to seated. Patient not able to ambulate farther due to the pain and assisted back to supine with call light and tray table in reach. Reported findings and reported pain to RN.     Treatment:  Patient practiced and was instructed in the following treatment:     Bed mobility: instructed in safety with linen management, sequencing for rolling and

## 2020-02-26 NOTE — PROGRESS NOTES
Rate: 50 mL/hr at 02/25/20 1951    insulin lispro (HUMALOG) injection vial 0-12 Units, 0-12 Units, Subcutaneous, TID WC, Deb Ped Malmer, DO    insulin lispro (HUMALOG) injection vial 0-6 Units, 0-6 Units, Subcutaneous, Nightly, Deb Ped Malmer, DO, 6 Units at 02/25/20 2122    glucose (GLUTOSE) 40 % oral gel 15 g, 15 g, Oral, PRN, Deb Ped Malmer, DO    dextrose 50 % IV solution, 12.5 g, Intravenous, PRN, Deb Ped Malmer, DO    glucagon (rDNA) injection 1 mg, 1 mg, Intramuscular, PRN, Deb Ped Malmer, DO    dextrose 5 % solution, 100 mL/hr, Intravenous, PRN, Deb Ped Malmer, DO    Objective:    /64   Pulse 102   Temp 97.7 °F (36.5 °C) (Temporal)   Resp 18   Ht 5' 3.5\" (1.613 m)   Wt 228 lb (103.4 kg)   SpO2 99%   BMI 39.75 kg/m²     Heart:  irreg  Lungs:  CTA bilaterally, no wheeze, rales or rhonchi  Abd: bowel sounds present, nontender, nondistended, no masses  Extrem:  Min edema legs    CBC with Differential:    Lab Results   Component Value Date    WBC 12.5 02/25/2020    RBC 4.38 02/25/2020    HGB 11.8 02/25/2020    HCT 37.7 02/25/2020     02/25/2020    MCV 86.1 02/25/2020    MCH 26.9 02/25/2020    MCHC 31.3 02/25/2020    RDW 14.9 02/25/2020    LYMPHOPCT 7.9 02/25/2020    MONOPCT 7.4 02/25/2020    BASOPCT 0.0 02/25/2020    MONOSABS 0.92 02/25/2020    LYMPHSABS 0.98 02/25/2020    EOSABS 0.00 02/25/2020    BASOSABS 0.00 02/25/2020     CMP:    Lab Results   Component Value Date     02/26/2020    K 4.7 02/26/2020    K 5.1 02/07/2018    CL 98 02/26/2020    CO2 23 02/26/2020     02/26/2020    CREATININE 3.2 02/26/2020    GFRAA 17 02/26/2020    LABGLOM 17 02/26/2020    GLUCOSE 261 02/26/2020    PROT 7.1 02/25/2020    LABALBU 4.0 02/25/2020    CALCIUM 9.1 02/26/2020    BILITOT 0.5 02/25/2020    ALKPHOS 70 02/25/2020    AST 9 02/25/2020    ALT 13 02/25/2020     Warfarin PT/INR:    Lab Results   Component Value Date    INR 1.7 02/25/2020    INR 1.2 11/20/2019    INR 1.1 10/06/2017    PROTIME 18.9 (H) 02/25/2020    PROTIME 13.2 (H) 11/20/2019    PROTIME 11.8 10/06/2017       Assessment:    Principal Problem:    DARVIN (acute kidney injury) ([facility]  Active Problems:    Permanent atrial fibrillation    HTN (hypertension)    Hyperlipidemia    CAD (coronary artery disease)    Hypothyroidism    Gastroesophageal reflux disease    COPD (chronic obstructive pulmonary disease) ([facility])    Obstructive sleep apnea    Generalized OA    Chronic heart failure with preserved ejection fraction (HFpEF) ([facility])    Mitral regurgitation    Type 2 diabetes mellitus, with long-term current use of insulin ([facility])    Chronic anticoagulation    CKD (chronic kidney disease) stage 4, GFR 15-29 ml/min ([facility])    Obesity  Resolved Problems:    * No resolved hospital problems.  *      Plan:  Increase lantus cont cautious iv hydration        Kaitlynn Shipley  8:47 AM  2/26/2020

## 2020-02-26 NOTE — H&P
to see. Blood sugar  control. Serial lab. Discharge plan, home when stable.         Jensen Mcclendon DO    D: 02/25/2020 16:20:28       T: 02/25/2020 16:35:44     [email or web address]  Job#: [id number]     Doc#: [id number]    CC:

## 2020-02-26 NOTE — PROGRESS NOTES
[facility]  Nephrology Attending Progress Note  [facility]. MD Arturo        SUBJECTIVE:     Ms. Abdoulaye Cox is a 76year old female with PMH of CKD stage IV, IDDM2, HFrEF (EF 55%), CAD, chronic A fib on Eliquis and DJD who presents to ED with persistent hyperglycemia. Over the past month, the patient states that she has been compliant with her insulin regimen, but has had persistently elevated blood glucose readings at home between 400-600. Recently, over the past week, the patient states that she has developed vague URI symptoms including sore throat, rhinorrhea and cough productive of clear sputum. During this time, patient also endorses decreased PO intake and nausea, but no vomiting. She also states that she has been compliant with current GDMT medications. Regarding DJD, patient states that she was recently on tramadol, but ran out and has since been taking Tylenol, but denies taking any other type of NSAID. Making urine, but denies any hematuria or dysuria.      On arrival to ED, the patient was hemodynamically stable. POCT glucose was elevated at 422, for which patient received IV insulin. Labs revealed slight hyponatremia with serum Na 131, elevated , elevated Cr 3.4 (baseline 2.3 in Nov 2019, 3.3 as of 2/4/2020). Patient was subsequently given a 1L NS bolus, which is currently running at the time of this assessment. Nephrology consulted for worsening BUN and Cr above baseline. 2/26: pt c/o stomach cramping and nausea. Cr trending down to near baseline 3.2 this AM. Glucose better controlled today.      PROBLEM LIST:    Patient Active Problem List   Diagnosis    Permanent atrial fibrillation    HTN (hypertension)    Asthma    Hyperlipidemia    CAD (coronary artery disease)    Hypothyroidism    Anxiety    Gastroesophageal reflux disease    COPD (chronic obstructive pulmonary disease) (HCC)    Vitamin D deficiency    Obstructive sleep apnea    Peripheral neuropathy    Generalized OA    LVH (left ventricular hypertrophy)    Pulmonary hypertension (HCC)    Valvular heart disease    Chronic heart failure with preserved ejection fraction (HFpEF) ([facility])    Chronic diastolic congestive heart failure, NYHA class 3 ([facility])    Mitral regurgitation    Type 2 diabetes mellitus, with long-term current use of insulin ([facility])    Chronic anticoagulation    Nonrheumatic mitral valve regurgitation    Red blood cell antibody positive    Renal failure    CKD (chronic kidney disease) stage 4, GFR 15-29 ml/min ([facility])    DARVIN (acute kidney injury) ([address]    Obesity        PAST MEDICAL HISTORY:    Past Medical History:   Diagnosis Date    Adenoma of left adrenal gland     Adenoma of left adrenal gland     Dr Jarred Burt at [facility] DARVIN (acute kidney injury) ([address]     Albuminuria     Anterior myocardial infarction ([address]     Anxiety 1/16/2017    Asthma     Atrial fibrillation ([address]     CAD (coronary artery disease)     CKD (chronic kidney disease) stage 3, GFR 30-59 ml/min ([facility])     Congestive heart failure, NYHA class 3 and ACC/AHA stage C ([address] 1/16/2017    COPD (chronic obstructive pulmonary disease) ([address]     Dementia ([address]     patient denies, per Dr Trung Tavarez, will remove Dx    Diabetes mellitus ([address]     Diverticular disease     Diverticulitis     Dry eye     [facility] Lavelle [facility] Elevated plasma metanephrines     Esophagitis, [facility] grade A 12/2013    Fecal incontinence 5/9/2017    Dr Cortney Novoa Fibromyalgia     Gastritis     Gastroesophageal reflux disease without esophagitis 1/16/2017    Generalized OA     GI bleed     Hiatal hernia     4 cm-Dr Cortney Novoa History of methicillin resistant Staphylococcus aureus infection 12/1/2011    Hyperlipidemia     Hyperplastic colon polyp 2008    Hypertension     Hypokalemia     Hypothyroidism     Iron deficiency anemia     Left atrial dilation 2/14/2017    Lumbar herniated disc     LVH (left ventricular hypertrophy) 1/16/2017    Microalbuminuria 4/22/2012    STACEY (obstructive sleep apnea)     currently not using CPAP    Osteoporosis     Peripheral neuropathy     Post laminectomy syndrome     Primary osteoarthritis involving multiple joints 5/11/2017    Primary osteoarthritis of both knees 3/3/2017    Proteinuria     Pulmonary hypertension ([facility] 1/16/2017    Syncope     Tobacco abuse     Valvular heart disease 1/16/2017    Dilated RV, thickened MV, mod-severe MR    Vitamin D deficiency        DIET:    DIET CARB CONTROL;     PHYSICAL EXAM:     Patient Vitals for the past 24 hrs:   BP Temp Temp src Pulse Resp SpO2   02/26/20 1230 (!) 114/57 97 °F (36.1 °C) Temporal 89 18 96 %   02/26/20 0800 (!) 105/56 97.6 °F (36.4 °C) Temporal 76 18 94 %   02/25/20 1930 127/64 97.7 °F (36.5 °C) Temporal 102 18 99 %   02/25/20 1808 (!) 138/94 -- -- 82 16 97 %   @      Intake/Output Summary (Last 24 hours) at 2/26/2020 1307  Last data filed at 2/26/2020 0932  Gross per 24 hour   Intake 890 ml   Output 500 ml   Net 390 ml         Wt Readings from Last 3 Encounters:   02/25/20 228 lb (103.4 kg)   02/20/20 238 lb (108 kg)   02/04/20 228 lb (103.4 kg)       Constitutional: Patient in no acute distress   Head: normocephalic, atraumatic   ENT: dry oral mucosa  Neck: supple, no jvd  Cardiovascular: irregularly irregular rhythm, rate controlled, no murmurs, gallops, or rubs   Respiratory: Clear, no rales, rhochi, or wheezes,   Gastrointestinal: soft, nontender, nondistended, no hepatosplenomegaly  Ext: trace pedal edema  Neuro: AAOx3, following commands, no focal deficits grossly  Skin: dry, no rash     MEDS (scheduled):    insulin glargine  70 Units Subcutaneous Nightly    apixaban  5 mg Oral BID    aspirin  81 mg Oral QAM    atorvastatin  40 mg Oral Daily    carvedilol  25 mg Oral BID WC    famotidine  10 mg Oral Daily    fluticasone  2 spray Nasal QAM    hydrALAZINE  100 mg Oral TID    ipratropium-albuterol  1 vial Inhalation Q4H WA    of 2/4/2020  Clinically hypovolemic on physical examination  Likely pre-renal in context of decreased PO fluid intake and diuretic use  Obtain urine studies  Caution with excessive IVF in setting of CHF  Will hold lisinopril and Bumex  Monitor I's/O's, urine output  No acidosis or electrolyte abnormalities to warrant HD at this time     2.) HFrEF  Not in acute exacerbation  On GDMT  Clinically dry  CXR clear, not suggestive of volume overload  Continue GDMT per primary, hold lisinopril and Bumex  Daily weights and strict I's/O's     3.) Chronic A fib  Rate presently controlled  Continue Eliquis for CHADSVASC 6     4.) IDDM2  Continue to monitor BG per primary  Cover hyperglycemia with sliding scale insulin  Carb control/Renal diet     5.) CAD  S/p CABG 1997, s/p PCI 2013  Continue ASA, Lipitor         Will discuss case with Dr. Elisha Carrillo MD PGY-1  Attending Physician: Rey Cortez. James Homans, MD    Pt seen and examined.  Agree with above  Cr improving with ivf and holding diuretics  Miri Henderson

## 2020-02-26 NOTE — PROGRESS NOTES
OCCUPATIONAL THERAPY INITIAL EVALUATION      Date:2020  Patient Name: Mabel Walter  MRN: [id number]  : 1945  Room: [facility]    Referring Physician:  Aníbal Rudd DO    Evaluating OT:  TOM Castañeda, OTR/L #[id number]      AM-PAC Daily Activity Raw Score:    Recommended Adaptive Equipment:  TBD as pt progresses     Reason for Admission:  Pt was admitted d/t Hyperglycemia, Blood Sugar > 600    Diagnosis:  Elevated BUN, Dehydration, Hyperglycemia     Procedures this admission:  None     Pertinent Medical History:  Extensive History including MI, A Fib, DM, Back Surgery      Precautions:  Falls  Carb Control Diet  Right Foot Drop/AFO    Home Living: Pt lives alone in a single-level apartment with 2 ROSALINA and 1 HR/s. Bed/bath on the main floor   Bathroom setup:  Tub-Shower, Standard Commode   Equipment owned:  Extended Tub Bench, Rollator, W/C, Sock Aid, Reacher    Available Family Assist:  Dtr provides assist PRN. Has Aides Service M-F 10AM-2:30 - assist w/ bathing, Home mgmt    Prior Level of Function:  Pt was IND with Dressing, Toileting, SUP for bathing/tub transfer, IND with Light IADLs (Light meal prep/home mgmt), Transfers and Mobility using Rollator for household ambulation.    Driving:  No  Occupation:  None reported    Pain Level:  9/10 HA, Neck Pain; Nsg Notified   Additional Complaints:  Chronic, Moderate Dizziness, Anxiety    Vitals/Lab Values: , O2 sats 97% on room air     Cognition: A & O x 4   Able to Follow Multi-Step Commands INDly   Memory:  good (-)   Sequencing:  good (-)   Problem solving:  good (-)   Judgement/safety:  good (-)  Additional Comments:  Pt was pleasant and cooperative at start of session, by end of session she was Very anxious and tearful re: exacerbation of symptoms       Functional Assessment:   Initial Eval Status  Date: 20 Treatment Status  Date: Short Term/Long Term Goals  Treatment frequency: PRN 2-4 x/week  1-2 weeks   Feeding IND      NA Transfers and Functional Mobility as noted above, as well as set up and clean up for session. Skilled monitoring of Vitals and pts response to treatment. Consulted RN, PT    -- Education:  Provided Pt/Family ed re: Benefits/Purpose of OT services;  OT Plan of Care;  Safety w/ Functional Transfers; Walker safety w/ Functional ax/mobility; Benefits of/Techs for use of DME/AD/Adaptive equip/techs to increase safety/IND with Functional Ax; Techs to increase Safety/Safety Awareness w/ Functional Ax; Relaxation Techs/Pursed-Lip Breathing for Anxiety; Positioning/Pain mgmt Techs;  Benefits of Cont'd Participation in OT services during hospitalization and at D/C      Pt and/or Family verbalized/demonstrated a Good(-) understanding of education provided. Will Review PRN. At the end of the session, patient was properly positioned in Semi-Supine. Call light and phone within reach, all lines and tubes intact. Oriented pt to call bell. Made all appropriate Environmental Modifications to facilitate pt's level of IND and safety. All needs met. Bed Alarm activated.            Assessment of current deficits   Functional mobility [x]  ADLs [x] Strength [x]  Cognition []  Functional transfers  [x] IADLs [x] Safety Awareness [x]  Endurance [x]  Fine Motor Coordination [] Balance [x] Vision/perception [] Sensation []   Gross Motor Coordination [] ROM [] Delirium []                  Motor Control []    Plan of Care:   ADL retraining [x]   Equipment needs [x]   Neuromuscular re-education [] Energy Conservation Techniques [x]  Functional Transfer training [x] Patient and/or Family Education [x]  Functional Mobility training [x]  Environmental Modifications [x]  Cognitive re-training []   Compensatory techniques for ADLs [x]  Splinting Needs []   Positioning to improve overall function [x]   Therapeutic Activity [x]   Therapeutic Exercise  [x]  Visual/Perceptual: []    Delirium prevention/treatment  []  Other: [x]    Pt would benefit from continued skilled OT services to increase safety and independence with completion of ADL/IADL tasks for functional independence and quality of life. Pt/Family actively participated in the establishment of goals. Rehab Potential:  Good(-) for established goals    Patient / Family Goal:  Not stated at this time     Patient and/or Family were instructed on Functional Diagnosis, Prognosis/Goals and OT Plan of Care. Demonstrated Good(-) understanding. Evaluation Time includes thorough review of current medical information, gathering information on past medical history/social history and prior level of function, completion of standardized testing/informal observation of tasks, assessment of data and education on plan of care and goals.      Eval Complexity: Low  Profile and History - Mod  Assessment of Occupational Performance and Identification of Deficits - Mod  Clinical Decision Making - Low       Low Evaluation + 24 timed treatment minutes    Treatment Time In:  6328              Treatment Time Out:  1030    Treatment Charges: Mins Units   Ther Ex  95117     Manual Therapy 32378     Thera Activities 50884     ADL/Home Mgt 93550 24 2   Neuro Re-ed 49939     Group Therapy      Orthotic manage/training  73428     Non-Billable Time     Total Timed Treatment [address]         [facility], [address] [facility], OTR/L #[id number]

## 2020-02-27 LAB
ANION GAP SERPL CALCULATED.3IONS-SCNC: 14 MMOL/L (ref 7–16)
BUN BLDV-MCNC: 125 MG/DL (ref 8–23)
CALCIUM SERPL-MCNC: 8.7 MG/DL (ref 8.6–10.2)
CHLORIDE BLD-SCNC: 101 MMOL/L (ref 98–107)
CHLORIDE URINE RANDOM: <20 MMOL/L
CO2: 22 MMOL/L (ref 22–29)
CREAT SERPL-MCNC: 3.2 MG/DL (ref 0.5–1)
CREATININE URINE: 106 MG/DL (ref 29–226)
GFR AFRICAN AMERICAN: 17
GFR NON-AFRICAN AMERICAN: 17 ML/MIN/1.73
GLUCOSE BLD-MCNC: 157 MG/DL (ref 74–99)
METER GLUCOSE: 144 MG/DL (ref 74–99)
METER GLUCOSE: 254 MG/DL (ref 74–99)
METER GLUCOSE: 281 MG/DL (ref 74–99)
METER GLUCOSE: 65 MG/DL (ref 74–99)
POTASSIUM SERPL-SCNC: 5.3 MMOL/L (ref 3.5–5)
POTASSIUM, UR: 62.7 MMOL/L
SODIUM BLD-SCNC: 137 MMOL/L (ref 132–146)
SODIUM URINE: <20 MMOL/L

## 2020-02-27 PROCEDURE — 6370000000 HC RX 637 (ALT 250 FOR IP): Performed by: INTERNAL MEDICINE

## 2020-02-27 PROCEDURE — 36415 COLL VENOUS BLD VENIPUNCTURE: CPT

## 2020-02-27 PROCEDURE — 6360000002 HC RX W HCPCS: Performed by: INTERNAL MEDICINE

## 2020-02-27 PROCEDURE — 80048 BASIC METABOLIC PNL TOTAL CA: CPT

## 2020-02-27 PROCEDURE — 97535 SELF CARE MNGMENT TRAINING: CPT

## 2020-02-27 PROCEDURE — 2060000000 HC ICU INTERMEDIATE R&B

## 2020-02-27 PROCEDURE — 94640 AIRWAY INHALATION TREATMENT: CPT

## 2020-02-27 PROCEDURE — 82962 GLUCOSE BLOOD TEST: CPT

## 2020-02-27 PROCEDURE — 97530 THERAPEUTIC ACTIVITIES: CPT

## 2020-02-27 PROCEDURE — 2580000003 HC RX 258: Performed by: INTERNAL MEDICINE

## 2020-02-27 RX ORDER — HYDRALAZINE HYDROCHLORIDE 50 MG/1
50 TABLET, FILM COATED ORAL 3 TIMES DAILY
Status: DISCONTINUED | OUTPATIENT
Start: 2020-02-27 | End: 2020-02-28

## 2020-02-27 RX ORDER — INSULIN GLARGINE 100 [IU]/ML
75 INJECTION, SOLUTION SUBCUTANEOUS NIGHTLY
Status: DISCONTINUED | OUTPATIENT
Start: 2020-02-27 | End: 2020-03-02

## 2020-02-27 RX ORDER — CALCIUM CARBONATE 200(500)MG
500 TABLET,CHEWABLE ORAL ONCE
Status: COMPLETED | OUTPATIENT
Start: 2020-02-27 | End: 2020-02-27

## 2020-02-27 RX ADMIN — SODIUM CHLORIDE, PRESERVATIVE FREE 10 ML: 5 INJECTION INTRAVENOUS at 09:31

## 2020-02-27 RX ADMIN — BENZOCAINE AND MENTHOL 1 LOZENGE: 15; 3.6 LOZENGE ORAL at 12:30

## 2020-02-27 RX ADMIN — IPRATROPIUM BROMIDE AND ALBUTEROL SULFATE 3 ML: 2.5; .5 SOLUTION RESPIRATORY (INHALATION) at 09:48

## 2020-02-27 RX ADMIN — CARVEDILOL 25 MG: 25 TABLET, FILM COATED ORAL at 09:33

## 2020-02-27 RX ADMIN — IPRATROPIUM BROMIDE AND ALBUTEROL SULFATE 3 ML: 2.5; .5 SOLUTION RESPIRATORY (INHALATION) at 13:27

## 2020-02-27 RX ADMIN — HYDRALAZINE HYDROCHLORIDE 50 MG: 50 TABLET, FILM COATED ORAL at 21:35

## 2020-02-27 RX ADMIN — INSULIN GLARGINE 75 UNITS: 100 INJECTION, SOLUTION SUBCUTANEOUS at 21:36

## 2020-02-27 RX ADMIN — IPRATROPIUM BROMIDE AND ALBUTEROL SULFATE 3 ML: 2.5; .5 SOLUTION RESPIRATORY (INHALATION) at 18:14

## 2020-02-27 RX ADMIN — HYDRALAZINE HYDROCHLORIDE 100 MG: 50 TABLET, FILM COATED ORAL at 13:20

## 2020-02-27 RX ADMIN — APIXABAN 5 MG: 5 TABLET, FILM COATED ORAL at 09:32

## 2020-02-27 RX ADMIN — IPRATROPIUM BROMIDE AND ALBUTEROL SULFATE 3 ML: 2.5; .5 SOLUTION RESPIRATORY (INHALATION) at 02:05

## 2020-02-27 RX ADMIN — FAMOTIDINE 10 MG: 20 TABLET ORAL at 09:32

## 2020-02-27 RX ADMIN — FLUTICASONE PROPIONATE 2 SPRAY: 50 SPRAY, METERED NASAL at 09:34

## 2020-02-27 RX ADMIN — INSULIN LISPRO 3 UNITS: 100 INJECTION, SOLUTION INTRAVENOUS; SUBCUTANEOUS at 21:36

## 2020-02-27 RX ADMIN — INSULIN LISPRO 15 UNITS: 100 INJECTION, SOLUTION INTRAVENOUS; SUBCUTANEOUS at 06:27

## 2020-02-27 RX ADMIN — APIXABAN 5 MG: 5 TABLET, FILM COATED ORAL at 21:35

## 2020-02-27 RX ADMIN — LEVOTHYROXINE SODIUM 75 MCG: 0.07 TABLET ORAL at 09:34

## 2020-02-27 RX ADMIN — ACETAMINOPHEN 650 MG: 325 TABLET ORAL at 15:37

## 2020-02-27 RX ADMIN — ONDANSETRON 4 MG: 2 INJECTION INTRAMUSCULAR; INTRAVENOUS at 11:45

## 2020-02-27 RX ADMIN — INSULIN LISPRO 15 UNITS: 100 INJECTION, SOLUTION INTRAVENOUS; SUBCUTANEOUS at 11:45

## 2020-02-27 RX ADMIN — HYDRALAZINE HYDROCHLORIDE 100 MG: 50 TABLET, FILM COATED ORAL at 09:32

## 2020-02-27 RX ADMIN — IPRATROPIUM BROMIDE AND ALBUTEROL SULFATE 3 ML: 2.5; .5 SOLUTION RESPIRATORY (INHALATION) at 21:41

## 2020-02-27 RX ADMIN — ATORVASTATIN CALCIUM 40 MG: 40 TABLET, FILM COATED ORAL at 09:33

## 2020-02-27 RX ADMIN — CARVEDILOL 25 MG: 25 TABLET, FILM COATED ORAL at 17:06

## 2020-02-27 RX ADMIN — INSULIN LISPRO 6 UNITS: 100 INJECTION, SOLUTION INTRAVENOUS; SUBCUTANEOUS at 09:35

## 2020-02-27 RX ADMIN — CALCIUM CARBONATE (ANTACID) CHEW TAB 500 MG 500 MG: 500 CHEW TAB at 11:57

## 2020-02-27 RX ADMIN — ASPIRIN 81 MG 81 MG: 81 TABLET ORAL at 09:33

## 2020-02-27 ASSESSMENT — PAIN SCALES - GENERAL
PAINLEVEL_OUTOF10: 0
PAINLEVEL_OUTOF10: 10
PAINLEVEL_OUTOF10: 0
PAINLEVEL_OUTOF10: 8

## 2020-02-27 ASSESSMENT — PAIN DESCRIPTION - LOCATION: LOCATION: ABDOMEN

## 2020-02-27 ASSESSMENT — PAIN DESCRIPTION - PAIN TYPE: TYPE: ACUTE PAIN

## 2020-02-27 ASSESSMENT — PAIN DESCRIPTION - DESCRIPTORS: DESCRIPTORS: ACHING;CRAMPING

## 2020-02-27 NOTE — PROGRESS NOTES
of OOB activity    Patient response to education:   Pt verbalized understanding Pt demonstrated skill Pt requires further education in this area   x x x     ASSESSMENT:    Comments:  Pt supine upon entering room. Pt demo anxiety and slight impulsiveness throughout session. Pt returned to bedside chair at end of session with all needs met. Treatment:  Patient practiced and was instructed in the following treatment:     Therapeutic Activity: Pt demo heavy mouth breathing throughout session and was constantly reminded of correct breathing technique throughout session. Pt performed stand pivot to bedside chair to change gown and colin socks. Pt demo steady gait however required min a and verbal cues for ww approximation during pivots.  Therapeutic Exercise: As stated above to increase ROM, strength and decrease edema in B LE. PLAN:    Patient is making good progress towards established goals. Will continue with current POC.       Time in  0905  Time out  0930    Total Treatment Time  25 minutes     CPT codes:  [] Gait training 92220  minutes  [] Manual therapy 23345 minutes  [x] Therapeutic activities 26583 25 minutes  [] Therapeutic exercises 96898 0 minutes  [] Neuromuscular reeducation 46574 minutes    Clearence Scrivener PTA 1413

## 2020-02-27 NOTE — PLAN OF CARE
Problem: Falls - Risk of:  Goal: Will remain free from falls  Description  Will remain free from falls  2/26/2020 2213 by Gian Mccann RN  Outcome: Met This Shift  2/26/2020 1109 by Ed Barraza RN  Outcome: Met This Shift  Goal: Absence of physical injury  Description  Absence of physical injury  Outcome: Met This Shift     Problem: Pain:  Goal: Control of acute pain  Description  Control of acute pain  Outcome: Met This Shift     Problem: Risk for Impaired Skin Integrity  Goal: Tissue integrity - skin and mucous membranes  Description  Structural intactness and normal physiological function of skin and  mucous membranes.   Outcome: Met This Shift     Problem: Pain:  Goal: Pain level will decrease  Description  Pain level will decrease  2/26/2020 2213 by Gian Mccann RN  Outcome: Not Met This Shift  2/26/2020 1109 by Ed Barraza RN  Outcome: Met This Shift  Goal: Control of chronic pain  Description  Control of chronic pain  Outcome: Not Met This Shift

## 2020-02-27 NOTE — PROGRESS NOTES
Occupational Therapy  OT BEDSIDE TREATMENT NOTE      Date:2020  Patient Name: Omar Patel  MRN: [id number]  : 1945  Room: [address] [place] [address]-A     Referring Physician:  Julianna Mc DO     Evaluating OT:  TOM Khan, OTR/L #[id number]     AM-PAC Daily Activity Raw Score:  14  Recommended Adaptive Equipment:  TBD as pt progresses      Reason for Admission:  Pt was admitted d/t Hyperglycemia, Blood Sugar > 600     Diagnosis:  Elevated BUN, Dehydration, Hyperglycemia     Procedures this admission:  None      Pertinent Medical History:  Extensive History including MI, A Fib, DM, Back Surgery       Precautions:  Falls  Carb Control Diet  Right Foot Drop/AFO     Home Living: Pt lives alone in a single-level apartment with 2 ROSALINA and 1 HR/s. Bed/bath on the main floor   Bathroom setup:  Tub-Shower, Standard Commode   Equipment owned:  Extended Tub Bench, Rollator, W/C, Sock Aid, Reacher     Available Family Assist:  Dtr provides assist PRN. Has Aides Service M-F 10AM-2:30 - assist w/ bathing, Home mgmt     Prior Level of Function:  Pt was IND with Dressing, Toileting, SUP for bathing/tub transfer, IND with Light IADLs (Light meal prep/home mgmt), Transfers and Mobility using Rollator for household ambulation. Driving:  No  Occupation:  None reported     Pain Level:  9/10 with SOB. Additional Complaints:  Chronic, Moderate Dizziness, Anxiety     Cognition: A & O x 4   Able to Follow Multi-Step Commands INDly              Memory:  good (-)              Sequencing:  good (-)              Problem solving:  good (-)              Judgement/safety:  good (-)  Additional Comments:  Pt was pleasant and cooperative throughout session.                  Functional Assessment:    Initial Eval Status  Date: 20 Treatment Status  Date: 20 Short Term/Long Term Goals  Treatment frequency: PRN 2-4 x/week  1-2 weeks   Feeding IND        Indep NA   Grooming SUP/Set up     Able to perform tasks after set up while seated EOB, unable to ambulate to/stand at sink d/t Pain, dizziness, anxiety  Setup; Pt able to wash face and comb hair while sitting up in the chair. Pt stated she will brush teeth later this date. SUP  Standing At The Sink   UB Dressing Min A/Set up     Required Min A to don garment after set up while seated EOB, Pt ed for adaptive techs Mod A; To colin/doff gown with verbal prompts to increase proper fitting while pt sitting up on the EOB.      Set up   LB Dressing Max A/Set up     Required Max A to don socks seated EOB, Mod A for standing balance +Mod A for clothing adjustment, pt ed/VCs for safety, adaptive equip   Max A; To colin/doff socks while on EOB due to poor ROM and SOB. Mod A   Bathing NT     Pt declined  N/T Mod A   Toileting NT     Pt declined  N/T;    Pt declined to complete at this time. Pt stated she has gone earlier. Mod A   Bed Mobility  Rolling:  SUP  Repositioning:  Min A   Supine to Sit:  Min A    Sit to Supine:  Min A      Required Assist + VCs/pt ed for tech   Bed mobility: Min A; To transfer from supine to sitting position with HOB slightly elevated. SUP   Functional Transfers Sit to stand:  Min A  Stand to sit:  Min A       From EOB 2x  Pt ed re: safety/hand placement  Sit to stand:  Min A  Stand to sit:  Min A;    Min verbal prompting for proper hand placement due to pt sitting abruptly. SUP   Functional Mobility Min A w/ FWW     Only able to tolerate side-stepping along EOB, unable to tolerate further distance d/t pain - Nsg made aware  Min A; To ambulate through room and into hallway with w/w; pt very SOB while completing ambulation. Verbal prompts required to increase safety and maintain posture.     SUP   Balance Sitting:       Static:  SUP    Dynamic:  Close SUP at EOB w/ functional ax     Standing:       Static:  Min A w/ FWW    Dynamic:  Min A w/ FWW functional ax/mobility  Sitting:       Static: Indep    Dynamic:  Sup     Standing:       Static: Therapy        Orthotic manage/training  [id number]       Non-Billable Time       Total Timed Treatment 25 [address], [address] [place] [address]

## 2020-02-27 NOTE — PROGRESS NOTES
[facility]  Nephrology Attending Progress Note  Anne Marie Hernandez. MD Arturo        SUBJECTIVE:     Ms. Del Navarro is a 76year old female with PMH of CKD stage IV, IDDM2, HFrEF (EF 55%), CAD, chronic A fib on Eliquis and DJD who presents to ED with persistent hyperglycemia. Over the past month, the patient states that she has been compliant with her insulin regimen, but has had persistently elevated blood glucose readings at home between 400-600. Recently, over the past week, the patient states that she has developed vague URI symptoms including sore throat, rhinorrhea and cough productive of clear sputum. During this time, patient also endorses decreased PO intake and nausea, but no vomiting. She also states that she has been compliant with current GDMT medications. Regarding DJD, patient states that she was recently on tramadol, but ran out and has since been taking Tylenol, but denies taking any other type of NSAID. Making urine, but denies any hematuria or dysuria.      On arrival to ED, the patient was hemodynamically stable. POCT glucose was elevated at 422, for which patient received IV insulin. Labs revealed slight hyponatremia with serum Na 131, elevated , elevated Cr 3.4 (baseline 2.3 in Nov 2019, 3.3 as of 2/4/2020). Patient was subsequently given a 1L NS bolus, which is currently running at the time of this assessment. Nephrology consulted for worsening BUN and Cr above baseline.      2/26: pt c/o stomach cramping and nausea. Cr trending down to near baseline 3.2 this AM. Glucose better controlled today. 2/27: pt seen, c/o GERD symptoms. Cr at baseline 3.2 today. BG under improved control. Ambulating around unit this AM. Clinically euvolemic. No other acute complaints at this time. K 5.3 this AM. No chest pain, palpitations.  No T wave or QRS changes grossly on monitor     PROBLEM LIST:    Patient Active Problem List   Diagnosis    Permanent atrial fibrillation    HTN (hypertension)    Asthma    infection 12/1/2011    Hyperlipidemia     Hyperplastic colon polyp 2008    Hypertension     Hypokalemia     Hypothyroidism     Iron deficiency anemia     Left atrial dilation 2/14/2017    Lumbar herniated disc     LVH (left ventricular hypertrophy) 1/16/2017    Microalbuminuria 4/22/2012    STACEY (obstructive sleep apnea)     currently not using CPAP    Osteoporosis     Peripheral neuropathy     Post laminectomy syndrome     Primary osteoarthritis involving multiple joints 5/11/2017    Primary osteoarthritis of both knees 3/3/2017    Proteinuria     Pulmonary hypertension ([address] 1/16/2017    Syncope     Tobacco abuse     Valvular heart disease 1/16/2017    Dilated RV, thickened MV, mod-severe MR    Vitamin D deficiency        DIET:    DIET CARB CONTROL;     PHYSICAL EXAM:     Patient Vitals for the past 24 hrs:   BP Temp Temp src Pulse Resp SpO2 Weight   02/27/20 1128 (!) 88/51 97.3 °F (36.3 °C) Temporal 90 18 98 % --   02/27/20 0749 112/60 97.8 °F (36.6 °C) Oral 60 16 96 % --   02/27/20 0621 -- -- -- -- -- -- 228 lb (103.4 kg)   02/27/20 0000 (!) 81/46 97.3 °F (36.3 °C) Temporal 80 18 98 % --   02/26/20 1615 (!) 117/57 97.2 °F (36.2 °C) Temporal 99 17 97 % --   02/26/20 1518 (!) 108/58 -- -- 96 -- -- --   02/26/20 1515 (!) 98/57 -- -- 78 -- -- --   @      Intake/Output Summary (Last 24 hours) at 2/27/2020 1325  Last data filed at 2/27/2020 1311  Gross per 24 hour   Intake 1037 ml   Output 950 ml   Net 87 ml         Wt Readings from Last 3 Encounters:   02/27/20 228 lb (103.4 kg)   02/20/20 238 lb (108 kg)   02/04/20 228 lb (103.4 kg)     Constitutional: Patient in no acute distress   Head: normocephalic, atraumatic   ENT: dry oral mucosa  Neck: supple, no jvd  Cardiovascular: irregularly irregular rhythm, rate controlled, no murmurs, gallops, or rubs   Respiratory: Clear, no rales, rhochi, or wheezes,   Gastrointestinal: soft, nontender, nondistended, no hepatosplenomegaly  Ext: trace pedal edema  Neuro: AAOx3, following commands, no focal deficits grossly  Skin: dry, no rash     MEDS (scheduled):    insulin glargine  75 Units Subcutaneous Nightly    apixaban  5 mg Oral BID    aspirin  81 mg Oral QAM    atorvastatin  40 mg Oral Daily    carvedilol  25 mg Oral BID WC    famotidine  10 mg Oral Daily    fluticasone  2 spray Nasal QAM    hydrALAZINE  100 mg Oral TID    ipratropium-albuterol  1 vial Inhalation Q4H WA    levothyroxine  75 mcg Oral Daily    insulin lispro  15 Units Subcutaneous TID AC    sodium chloride flush  10 mL Intravenous 2 times per day    insulin lispro  0-12 Units Subcutaneous TID WC    insulin lispro  0-6 Units Subcutaneous Nightly       MEDS (infusions):   sodium chloride 50 mL/hr at 02/25/20 1951    dextrose         MEDS (prn):  benzocaine-menthol, acetaminophen, albuterol sulfate HFA, sodium chloride flush, polyethylene glycol, ondansetron, glucose, dextrose, glucagon (rDNA), dextrose    DATA:    Recent Labs     02/25/20  1230   WBC 12.5*   HGB 11.8   HCT 37.7   MCV 86.1        Recent Labs     02/25/20  1230 02/25/20  1640 02/26/20  0435 02/27/20  0503   *  --  138 137   K 4.8  --  4.7 5.3*   CL 91*  --  98 101   CO2 25  --  23 22   *  --  130* 125*   CREATININE 3.4*  --  3.2* 3.2*   LABGLOM 16  --  17 17   GLUCOSE 458* 352 261* 157*   CALCIUM 9.4  --  9.1 8.7   ALT 13  --   --   --    AST 9  --   --   --    BILITOT 0.5  --   --   --    ALKPHOS 70  --   --   --        Lab Results   Component Value Date    LABALBU 4.0 02/25/2020    LABALBU 3.9 09/25/2019    LABALBU 3.4 (L) 09/18/2019     Lab Results   Component Value Date    TSH 1.730 08/23/2019       Iron Studies  No results found for: IRON, TIBC, FERRITIN  No results found for: [id number]  No results found for: FOLATE    Vit D, 25-Hydroxy   Date Value Ref Range Status   08/23/2019 48 30 - 100 ng/mL Final     Comment:     <20 ng/mL. ........... Jojo Pih Deficient  20-30

## 2020-02-27 NOTE — PROGRESS NOTES
glycol (GLYCOLAX) packet 17 g, 17 g, Oral, Daily PRN, Lonne Raysal Malmer, DO    [facility]) injection 4 mg, 4 mg, Intravenous, Q6H PRN, [facility] Malmer, DO, 4 mg at 02/27/20 1145    0.9 % sodium chloride infusion, , Intravenous, Continuous, Lonne Raysal Malmer, DO, Last Rate: 50 mL/hr at 02/25/20 1951    insulin lispro (HUMALOG) injection vial 0-12 Units, 0-12 Units, Subcutaneous, TID WC, Lonne Raysal Malmer, DO, 6 Units at 02/27/20 0935    insulin lispro (HUMALOG) injection vial 0-6 Units, 0-6 Units, Subcutaneous, Nightly, [facility], 1 Units at 02/26/20 2051    glucose (GLUTOSE) 40 % oral gel 15 g, 15 g, Oral, PRN, Issac Raysal Malmer, DO    dextrose 50 % IV solution, 12.5 g, Intravenous, PRN, [facility]    glucagon (rDNA) injection 1 mg, 1 mg, Intramuscular, PRN, [facility]    dextrose 5 % solution, 100 mL/hr, Intravenous, PRN, [facility], DO    Objective:    BP (!) 88/51   Pulse 90   Temp 97.3 °F (36.3 °C) (Temporal)   Resp 18   Ht 5' 3.5\" (1.613 m)   Wt 228 lb (103.4 kg)   SpO2 98%   BMI 39.75 kg/m²     Heart:  irreg  Lungs:  CTA bilaterally, no wheeze, rales or rhonchi  Abd: bowel sounds present, nontender, nondistended, no masses  Extrem:  Min edema legs    CBC with Differential:    Lab Results   Component Value Date    WBC 12.5 02/25/2020    RBC 4.38 02/25/2020    HGB 11.8 02/25/2020    HCT 37.7 02/25/2020     02/25/2020    MCV 86.1 02/25/2020    MCH 26.9 02/25/2020    MCHC 31.3 02/25/2020    RDW 14.9 02/25/2020    LYMPHOPCT 7.9 02/25/2020    MONOPCT 7.4 02/25/2020    BASOPCT 0.0 02/25/2020    MONOSABS 0.92 02/25/2020    LYMPHSABS 0.98 02/25/2020    EOSABS 0.00 02/25/2020    BASOSABS 0.00 02/25/2020     CMP:    Lab Results   Component Value Date     02/27/2020    K 5.3 02/27/2020    K 5.1 02/07/2018     02/27/2020    CO2 22 02/27/2020     02/27/2020    CREATININE 3.2 02/27/2020    GFRAA 17 02/27/2020    LABGLOM 17 02/27/2020

## 2020-02-27 NOTE — PLAN OF CARE
Problem: Falls - Risk of:  Goal: Will remain free from falls  Description  Will remain free from falls  2/27/2020 1759 by Tang Strong RN  Outcome: Met This Shift  2/27/2020 0957 by Tang Strong RN  Outcome: Met This Shift     Problem: Risk for Impaired Skin Integrity  Goal: Tissue integrity - skin and mucous membranes  Description  Structural intactness and normal physiological function of skin and  mucous membranes.   2/27/2020 1759 by Tang Strong RN  Outcome: Met This Shift  2/27/2020 0957 by Tang Strong RN  Outcome: Met This Shift

## 2020-02-28 LAB
ANION GAP SERPL CALCULATED.3IONS-SCNC: 13 MMOL/L (ref 7–16)
BUN BLDV-MCNC: 130 MG/DL (ref 8–23)
CALCIUM SERPL-MCNC: 8.1 MG/DL (ref 8.6–10.2)
CHLORIDE BLD-SCNC: 101 MMOL/L (ref 98–107)
CO2: 20 MMOL/L (ref 22–29)
CREAT SERPL-MCNC: 3.1 MG/DL (ref 0.5–1)
GFR AFRICAN AMERICAN: 18
GFR NON-AFRICAN AMERICAN: 18 ML/MIN/1.73
GLUCOSE BLD-MCNC: 238 MG/DL (ref 74–99)
METER GLUCOSE: 118 MG/DL (ref 74–99)
METER GLUCOSE: 182 MG/DL (ref 74–99)
METER GLUCOSE: 217 MG/DL (ref 74–99)
METER GLUCOSE: 221 MG/DL (ref 74–99)
METER GLUCOSE: 240 MG/DL (ref 74–99)
POTASSIUM SERPL-SCNC: 5.5 MMOL/L (ref 3.5–5)
SODIUM BLD-SCNC: 134 MMOL/L (ref 132–146)

## 2020-02-28 PROCEDURE — 2580000003 HC RX 258: Performed by: INTERNAL MEDICINE

## 2020-02-28 PROCEDURE — 2500000003 HC RX 250 WO HCPCS: Performed by: INTERNAL MEDICINE

## 2020-02-28 PROCEDURE — 94640 AIRWAY INHALATION TREATMENT: CPT

## 2020-02-28 PROCEDURE — 94760 N-INVAS EAR/PLS OXIMETRY 1: CPT

## 2020-02-28 PROCEDURE — 82962 GLUCOSE BLOOD TEST: CPT

## 2020-02-28 PROCEDURE — 80048 BASIC METABOLIC PNL TOTAL CA: CPT

## 2020-02-28 PROCEDURE — 36415 COLL VENOUS BLD VENIPUNCTURE: CPT

## 2020-02-28 PROCEDURE — 6370000000 HC RX 637 (ALT 250 FOR IP): Performed by: INTERNAL MEDICINE

## 2020-02-28 PROCEDURE — 2060000000 HC ICU INTERMEDIATE R&B

## 2020-02-28 RX ORDER — BUMETANIDE 0.25 MG/ML
1 INJECTION, SOLUTION INTRAMUSCULAR; INTRAVENOUS ONCE
Status: COMPLETED | OUTPATIENT
Start: 2020-02-28 | End: 2020-02-28

## 2020-02-28 RX ORDER — HYDRALAZINE HYDROCHLORIDE 25 MG/1
25 TABLET, FILM COATED ORAL 3 TIMES DAILY
Status: DISCONTINUED | OUTPATIENT
Start: 2020-02-28 | End: 2020-03-02 | Stop reason: HOSPADM

## 2020-02-28 RX ORDER — HYDRALAZINE HYDROCHLORIDE 25 MG/1
25 TABLET, FILM COATED ORAL 3 TIMES DAILY
Qty: 90 TABLET | Refills: 0 | Status: SHIPPED | OUTPATIENT
Start: 2020-02-28

## 2020-02-28 RX ADMIN — HYDRALAZINE HYDROCHLORIDE 25 MG: 25 TABLET, FILM COATED ORAL at 09:07

## 2020-02-28 RX ADMIN — BUMETANIDE 1 MG: 0.25 INJECTION INTRAMUSCULAR; INTRAVENOUS at 18:55

## 2020-02-28 RX ADMIN — IPRATROPIUM BROMIDE AND ALBUTEROL SULFATE 3 ML: 2.5; .5 SOLUTION RESPIRATORY (INHALATION) at 16:21

## 2020-02-28 RX ADMIN — IPRATROPIUM BROMIDE AND ALBUTEROL SULFATE 3 ML: 2.5; .5 SOLUTION RESPIRATORY (INHALATION) at 20:53

## 2020-02-28 RX ADMIN — CARVEDILOL 25 MG: 25 TABLET, FILM COATED ORAL at 09:04

## 2020-02-28 RX ADMIN — ASPIRIN 81 MG 81 MG: 81 TABLET ORAL at 09:07

## 2020-02-28 RX ADMIN — SODIUM CHLORIDE: 9 INJECTION, SOLUTION INTRAVENOUS at 22:04

## 2020-02-28 RX ADMIN — INSULIN GLARGINE 75 UNITS: 100 INJECTION, SOLUTION SUBCUTANEOUS at 22:07

## 2020-02-28 RX ADMIN — FAMOTIDINE 10 MG: 20 TABLET ORAL at 09:04

## 2020-02-28 RX ADMIN — ACETAMINOPHEN 650 MG: 325 TABLET ORAL at 15:25

## 2020-02-28 RX ADMIN — LEVOTHYROXINE SODIUM 75 MCG: 0.07 TABLET ORAL at 09:05

## 2020-02-28 RX ADMIN — INSULIN LISPRO 15 UNITS: 100 INJECTION, SOLUTION INTRAVENOUS; SUBCUTANEOUS at 17:02

## 2020-02-28 RX ADMIN — INSULIN LISPRO 15 UNITS: 100 INJECTION, SOLUTION INTRAVENOUS; SUBCUTANEOUS at 09:11

## 2020-02-28 RX ADMIN — ACETAMINOPHEN 650 MG: 325 TABLET ORAL at 22:25

## 2020-02-28 RX ADMIN — CARVEDILOL 25 MG: 25 TABLET, FILM COATED ORAL at 18:50

## 2020-02-28 RX ADMIN — ALBUTEROL SULFATE 2 PUFF: 90 AEROSOL, METERED RESPIRATORY (INHALATION) at 09:06

## 2020-02-28 RX ADMIN — BENZOCAINE AND MENTHOL 1 LOZENGE: 15; 3.6 LOZENGE ORAL at 15:25

## 2020-02-28 RX ADMIN — ATORVASTATIN CALCIUM 40 MG: 40 TABLET, FILM COATED ORAL at 09:05

## 2020-02-28 RX ADMIN — FLUTICASONE PROPIONATE 2 SPRAY: 50 SPRAY, METERED NASAL at 09:06

## 2020-02-28 RX ADMIN — IPRATROPIUM BROMIDE AND ALBUTEROL SULFATE 3 ML: 2.5; .5 SOLUTION RESPIRATORY (INHALATION) at 09:14

## 2020-02-28 RX ADMIN — INSULIN LISPRO 2 UNITS: 100 INJECTION, SOLUTION INTRAVENOUS; SUBCUTANEOUS at 17:00

## 2020-02-28 RX ADMIN — INSULIN LISPRO 15 UNITS: 100 INJECTION, SOLUTION INTRAVENOUS; SUBCUTANEOUS at 06:51

## 2020-02-28 RX ADMIN — INSULIN LISPRO 4 UNITS: 100 INJECTION, SOLUTION INTRAVENOUS; SUBCUTANEOUS at 09:10

## 2020-02-28 RX ADMIN — APIXABAN 5 MG: 5 TABLET, FILM COATED ORAL at 09:06

## 2020-02-28 RX ADMIN — INSULIN LISPRO 2 UNITS: 100 INJECTION, SOLUTION INTRAVENOUS; SUBCUTANEOUS at 22:07

## 2020-02-28 RX ADMIN — IPRATROPIUM BROMIDE AND ALBUTEROL SULFATE 3 ML: 2.5; .5 SOLUTION RESPIRATORY (INHALATION) at 11:59

## 2020-02-28 RX ADMIN — APIXABAN 5 MG: 5 TABLET, FILM COATED ORAL at 20:43

## 2020-02-28 ASSESSMENT — PAIN DESCRIPTION - DESCRIPTORS: DESCRIPTORS: ACHING;CRAMPING;DISCOMFORT

## 2020-02-28 ASSESSMENT — PAIN DESCRIPTION - PAIN TYPE: TYPE: ACUTE PAIN

## 2020-02-28 ASSESSMENT — PAIN SCALES - GENERAL
PAINLEVEL_OUTOF10: 10
PAINLEVEL_OUTOF10: 0
PAINLEVEL_OUTOF10: 5
PAINLEVEL_OUTOF10: 0
PAINLEVEL_OUTOF10: 8

## 2020-02-28 ASSESSMENT — PAIN DESCRIPTION - LOCATION: LOCATION: ABDOMEN

## 2020-02-28 NOTE — PROGRESS NOTES
02/28/2020    GLUCOSE 238 02/28/2020    PROT 7.1 02/25/2020    LABALBU 4.0 02/25/2020    CALCIUM 8.1 02/28/2020    BILITOT 0.5 02/25/2020    ALKPHOS 70 02/25/2020    AST 9 02/25/2020    ALT 13 02/25/2020     Warfarin PT/INR:    Lab Results   Component Value Date    INR 1.7 02/25/2020    INR 1.2 11/20/2019    INR 1.1 10/06/2017    PROTIME 18.9 (H) 02/25/2020    PROTIME 13.2 (H) 11/20/2019    PROTIME 11.8 10/06/2017       Assessment:    Principal Problem:    DARVIN (acute kidney injury) ([facility] [address]  Active Problems:    Permanent atrial fibrillation    HTN (hypertension)    Hyperlipidemia    CAD (coronary artery disease)    Hypothyroidism    Gastroesophageal reflux disease    COPD (chronic obstructive pulmonary disease) ([facility])    Obstructive sleep apnea    Generalized OA    Chronic heart failure with preserved ejection fraction (HFpEF) ([facility])    Mitral regurgitation    Type 2 diabetes mellitus, with long-term current use of insulin ([facility])    Chronic anticoagulation    CKD (chronic kidney disease) stage 4, GFR 15-29 ml/min ([facility])    Obesity  Resolved Problems:    * No resolved hospital problems.  *      Plan:  Decrease hydralazine dose cont ivf per renal dc planning hopefully home 24 hours        Luther Oliveira  7:26 AM  2/28/2020

## 2020-02-28 NOTE — PLAN OF CARE
Problem: Risk for Impaired Skin Integrity  Goal: Tissue integrity - skin and mucous membranes  Description  Structural intactness and normal physiological function of skin and  mucous membranes.   Outcome: Met This Shift     Problem: Breathing Pattern - Ineffective:  Goal: Ability to achieve and maintain a regular respiratory rate will improve  Description  Ability to achieve and maintain a regular respiratory rate will improve  Outcome: Met This Shift

## 2020-02-28 NOTE — PROGRESS NOTES
[facility]  Nephrology Attending Progress Note  Olivia Winters. MD Arturo        SUBJECTIVE:     Ms. Leatha Leger is a 76year old female with PMH of CKD stage IV, IDDM2, HFrEF (EF 55%), CAD, chronic A fib on Eliquis and DJD who presents to ED with persistent hyperglycemia. Over the past month, the patient states that she has been compliant with her insulin regimen, but has had persistently elevated blood glucose readings at home between 400-600. Recently, over the past week, the patient states that she has developed vague URI symptoms including sore throat, rhinorrhea and cough productive of clear sputum. During this time, patient also endorses decreased PO intake and nausea, but no vomiting. She also states that she has been compliant with current GDMT medications. Regarding DJD, patient states that she was recently on tramadol, but ran out and has since been taking Tylenol, but denies taking any other type of NSAID. Making urine, but denies any hematuria or dysuria.      On arrival to ED, the patient was hemodynamically stable. POCT glucose was elevated at 422, for which patient received IV insulin. Labs revealed slight hyponatremia with serum Na 131, elevated , elevated Cr 3.4 (baseline 2.3 in Nov 2019, 3.3 as of 2/4/2020). Patient was subsequently given a 1L NS bolus, which is currently running at the time of this assessment. Nephrology consulted for worsening BUN and Cr above baseline.      2/26: pt c/o stomach cramping and nausea. Cr trending down to near baseline 3.2 this AM. Glucose better controlled today.      2/27: pt seen, c/o GERD symptoms. Cr at baseline 3.2 today. BG under improved control. Ambulating around unit this AM. Clinically euvolemic. No other acute complaints at this time. K 5.3 this AM. No chest pain, palpitations. No T wave or QRS changes grossly on monitor     2/28: seen, no acute complaints this morning. Cr remains at baseline, BG under control. Clinically euvolemic.  K elevated again this AM 5.5. No changes on monitor.      PROBLEM LIST:    Patient Active Problem List   Diagnosis    Permanent atrial fibrillation    HTN (hypertension)    Asthma    Hyperlipidemia    CAD (coronary artery disease)    Hypothyroidism    Anxiety    Gastroesophageal reflux disease    COPD (chronic obstructive pulmonary disease) ([address]    Vitamin D deficiency    Obstructive sleep apnea    Peripheral neuropathy    Generalized OA    LVH (left ventricular hypertrophy)    Pulmonary hypertension (HCC)    Valvular heart disease    Chronic heart failure with preserved ejection fraction (HFpEF) ([facility]    Chronic diastolic congestive heart failure, NYHA class 3 ([facility])    Mitral regurgitation    Type 2 diabetes mellitus, with long-term current use of insulin ([facility])    Chronic anticoagulation    Nonrheumatic mitral valve regurgitation    Red blood cell antibody positive    Renal failure    CKD (chronic kidney disease) stage 4, GFR 15-29 ml/min ([facility])    DARVIN (acute kidney injury) ([address]    Obesity        PAST MEDICAL HISTORY:    Past Medical History:   Diagnosis Date    Adenoma of left adrenal gland     Adenoma of left adrenal gland     [facility] at [facility] DARVIN (acute kidney injury) ([address]     Albuminuria     Anterior myocardial infarction ([address]     Anxiety 1/16/2017    Asthma     Atrial fibrillation (HCC)     CAD (coronary artery disease)     CKD (chronic kidney disease) stage 3, GFR 30-59 ml/min ([facility])     Congestive heart failure, NYHA class 3 and ACC/AHA stage C ([address] 1/16/2017    COPD (chronic obstructive pulmonary disease) ([address]     Dementia ([address]     patient denies, per Dr Brett Johnson, will remove Dx    Diabetes mellitus ([address]     Diverticular disease     Diverticulitis     Dry eye     [facility] Elevated plasma metanephrines     Esophagitis, [facility] grade A 12/2013    Fecal incontinence 5/9/2017    Dr Mami Mccall Fibromyalgia     Gastritis     Gastroesophageal reflux disease without esophagitis 1/16/2017    Generalized OA     GI bleed     Hiatal hernia     4 cm-Dr Lalita Macias History of methicillin resistant Staphylococcus aureus infection 12/1/2011    Hyperlipidemia     Hyperplastic colon polyp 2008    Hypertension     Hypokalemia     Hypothyroidism     Iron deficiency anemia     Left atrial dilation 2/14/2017    Lumbar herniated disc     LVH (left ventricular hypertrophy) 1/16/2017    Microalbuminuria 4/22/2012    STACEY (obstructive sleep apnea)     currently not using CPAP    Osteoporosis     Peripheral neuropathy     Post laminectomy syndrome     Primary osteoarthritis involving multiple joints 5/11/2017    Primary osteoarthritis of both knees 3/3/2017    Proteinuria     Pulmonary hypertension ([address] 1/16/2017    Syncope     Tobacco abuse     Valvular heart disease 1/16/2017    Dilated RV, thickened MV, mod-severe MR    Vitamin D deficiency        DIET:    DIET CARB CONTROL;     PHYSICAL EXAM:     Patient Vitals for the past 24 hrs:   BP Temp Temp src Pulse Resp SpO2 Weight   02/28/20 1203 (!) 122/56 97.3 °F (36.3 °C) Temporal 66 18 99 % --   02/28/20 1159 -- -- -- -- -- 99 % --   02/28/20 0914 -- -- -- -- -- 99 % --   02/28/20 0857 (!) 110/51 96.5 °F (35.8 °C) Temporal 82 18 100 % --   02/28/20 0821 (!) 142/64 97.1 °F (36.2 °C) Temporal 80 18 100 % --   02/28/20 0600 -- -- -- -- -- -- 230 lb (104.3 kg)   02/27/20 2000 (!) 115/59 97.2 °F (36.2 °C) Temporal 94 18 98 % --   02/27/20 1704 (!) 102/52 -- -- 97 -- -- --   02/27/20 1555 (!) 105/54 99 °F (37.2 °C) Temporal 89 18 95 % --   @      Intake/Output Summary (Last 24 hours) at 2/28/2020 1350  Last data filed at 2/28/2020 1251  Gross per 24 hour   Intake 1660 ml   Output 800 ml   Net 860 ml         Wt Readings from Last 3 Encounters:   02/28/20 230 lb (104.3 kg)   02/20/20 238 lb (108 kg)   02/04/20 228 lb (103.4 kg)     Constitutional: Patient in no acute distress   Head: normocephalic, atraumatic ENT: dry oral mucosa  Neck: supple, no jvd  Cardiovascular: irregularly irregular rhythm, rate controlled, no murmurs, gallops, or rubs   Respiratory: Clear, no rales, rhochi, or wheezes,   Gastrointestinal: soft, nontender, nondistended, no hepatosplenomegaly  Ext: trace pedal edema  Neuro: AAOx3, following commands, no focal deficits grossly  Skin: dry, no rash     MEDS (scheduled):    hydrALAZINE  25 mg Oral TID    insulin glargine  75 Units Subcutaneous Nightly    apixaban  5 mg Oral BID    aspirin  81 mg Oral QAM    atorvastatin  40 mg Oral Daily    carvedilol  25 mg Oral BID WC    famotidine  10 mg Oral Daily    fluticasone  2 spray Nasal QAM    ipratropium-albuterol  1 vial Inhalation Q4H WA    levothyroxine  75 mcg Oral Daily    insulin lispro  15 Units Subcutaneous TID AC    sodium chloride flush  10 mL Intravenous 2 times per day    insulin lispro  0-12 Units Subcutaneous TID WC    insulin lispro  0-6 Units Subcutaneous Nightly       MEDS (infusions):   sodium chloride 50 mL/hr at 02/25/20 1951    dextrose         MEDS (prn):  benzocaine-menthol, acetaminophen, albuterol sulfate HFA, sodium chloride flush, polyethylene glycol, ondansetron, glucose, dextrose, glucagon (rDNA), dextrose    DATA:    No results for input(s): WBC, HGB, HCT, MCV, PLT in the last 72 hours.   Recent Labs     02/26/20  0435 02/27/20  0503 02/28/20  0424    137 134   K 4.7 5.3* 5.5*   CL 98 101 101   CO2 23 22 20*   * 125* 130*   CREATININE 3.2* 3.2* 3.1*   LABGLOM 17 17 18   GLUCOSE 261* 157* 238*   CALCIUM 9.1 8.7 8.1*       Lab Results   Component Value Date    LABALBU 4.0 02/25/2020    LABALBU 3.9 09/25/2019    LABALBU 3.4 (L) 09/18/2019     Lab Results   Component Value Date    TSH 1.730 08/23/2019       Iron Studies  No results found for: IRON, TIBC, FERRITIN  No results found for: [id number]  No results found for: FOLATE    Vit D, 25-Hydroxy   Date Value Ref Range Status   08/23/2019 48 30 - 100 ng/mL Final     Comment:     <20 ng/mL. ........... Brea Bookman Deficient  20-30 ng/mL. ......... Brea Bookman Insufficient   ng/mL. ........ Brea Bookman Sufficient  >100 ng/mL. .......... Brea Bookman Toxic       PTH   Date Value Ref Range Status   01/04/2018 90 (H) 15 - 65 pg/mL Final       No components found for: URIC    Lab Results   Component Value Date    COLORU Yellow 02/25/2020    NITRU Negative 02/25/2020    GLUCOSEU 500 02/25/2020    KETUA Negative 02/25/2020    UROBILINOGEN 0.2 02/25/2020    BILIRUBINUR Negative 02/25/2020       No results found for: Robert Eusebio      IMPRESSION/RECOMMENDATIONS:      1.) Acute on Chronic Renal Failure  CKD stage IV, baseline Cr 3.3 as of 2/4/2020  Likely pre-renal in context of decreased PO fluid intake and diuretic use  Urine studies suggestive of pre-renal etiology  Caution with excessive IVF in setting of CHF, continue IV NS 50cc/hr  Hold lisinopril  Monitor I's/O's, urine output  No acidosis or electrolyte abnormalities to warrant HD at this time     2.) HFrEF  Not in acute exacerbation  On GDMT  Clinically dry  CXR clear, not suggestive of volume overload  Continue GDMT per primary, hold lisinopril  Daily weights and strict I's/O's     3.) Chronic A fib  Rate presently controlled  Continue Eliquis for CHADSVASC 6     4.) IDDM2  Continue to monitor BG per primary  Cover hyperglycemia with sliding scale insulin  Carb control/Renal diet     5.) CAD  S/p CABG 1997, s/p PCI 2013  Continue ASA, Lipitor     6.) Non-anion gap acidosis  Secondary to CKD    7.) Hyperkalemia  Likely due to [place] in context of CKD  OK for single dose Bumex 1mg IV x 1  Continue to monitor K on morning BMP        Will discuss case with Dr. Lily Diggs MD PGY-1  Attending Physician: Iftikhar Morris MD    Pt seen and examined agree with above  Cr at baseline  Bun with slow decrease  Dose buemx x 1  For hi k  Yousif Quesada

## 2020-02-29 LAB
ANION GAP SERPL CALCULATED.3IONS-SCNC: 14 MMOL/L (ref 7–16)
BUN BLDV-MCNC: 107 MG/DL (ref 8–23)
CALCIUM SERPL-MCNC: 8.3 MG/DL (ref 8.6–10.2)
CHLORIDE BLD-SCNC: 106 MMOL/L (ref 98–107)
CO2: 18 MMOL/L (ref 22–29)
CREAT SERPL-MCNC: 2.5 MG/DL (ref 0.5–1)
GFR AFRICAN AMERICAN: 23
GFR NON-AFRICAN AMERICAN: 23 ML/MIN/1.73
GLUCOSE BLD-MCNC: 117 MG/DL (ref 74–99)
METER GLUCOSE: 138 MG/DL (ref 74–99)
METER GLUCOSE: 194 MG/DL (ref 74–99)
METER GLUCOSE: 83 MG/DL (ref 74–99)
METER GLUCOSE: 95 MG/DL (ref 74–99)
POTASSIUM SERPL-SCNC: 5 MMOL/L (ref 3.5–5)
SODIUM BLD-SCNC: 138 MMOL/L (ref 132–146)

## 2020-02-29 PROCEDURE — 80048 BASIC METABOLIC PNL TOTAL CA: CPT

## 2020-02-29 PROCEDURE — 82962 GLUCOSE BLOOD TEST: CPT

## 2020-02-29 PROCEDURE — 6370000000 HC RX 637 (ALT 250 FOR IP): Performed by: INTERNAL MEDICINE

## 2020-02-29 PROCEDURE — 94640 AIRWAY INHALATION TREATMENT: CPT

## 2020-02-29 PROCEDURE — 2580000003 HC RX 258: Performed by: INTERNAL MEDICINE

## 2020-02-29 PROCEDURE — 36415 COLL VENOUS BLD VENIPUNCTURE: CPT

## 2020-02-29 PROCEDURE — 2060000000 HC ICU INTERMEDIATE R&B

## 2020-02-29 RX ADMIN — INSULIN LISPRO 15 UNITS: 100 INJECTION, SOLUTION INTRAVENOUS; SUBCUTANEOUS at 12:53

## 2020-02-29 RX ADMIN — IPRATROPIUM BROMIDE AND ALBUTEROL SULFATE 3 ML: 2.5; .5 SOLUTION RESPIRATORY (INHALATION) at 11:37

## 2020-02-29 RX ADMIN — APIXABAN 5 MG: 5 TABLET, FILM COATED ORAL at 20:52

## 2020-02-29 RX ADMIN — CARVEDILOL 25 MG: 25 TABLET, FILM COATED ORAL at 17:25

## 2020-02-29 RX ADMIN — HYDRALAZINE HYDROCHLORIDE 25 MG: 25 TABLET, FILM COATED ORAL at 15:04

## 2020-02-29 RX ADMIN — ATORVASTATIN CALCIUM 40 MG: 40 TABLET, FILM COATED ORAL at 09:05

## 2020-02-29 RX ADMIN — INSULIN GLARGINE 75 UNITS: 100 INJECTION, SOLUTION SUBCUTANEOUS at 20:52

## 2020-02-29 RX ADMIN — FAMOTIDINE 10 MG: 20 TABLET ORAL at 09:05

## 2020-02-29 RX ADMIN — APIXABAN 5 MG: 5 TABLET, FILM COATED ORAL at 09:05

## 2020-02-29 RX ADMIN — LEVOTHYROXINE SODIUM 75 MCG: 0.07 TABLET ORAL at 09:06

## 2020-02-29 RX ADMIN — IPRATROPIUM BROMIDE AND ALBUTEROL SULFATE 3 ML: 2.5; .5 SOLUTION RESPIRATORY (INHALATION) at 23:59

## 2020-02-29 RX ADMIN — HYDRALAZINE HYDROCHLORIDE 25 MG: 25 TABLET, FILM COATED ORAL at 09:05

## 2020-02-29 RX ADMIN — SODIUM CHLORIDE: 9 INJECTION, SOLUTION INTRAVENOUS at 17:27

## 2020-02-29 RX ADMIN — IPRATROPIUM BROMIDE AND ALBUTEROL SULFATE 3 ML: 2.5; .5 SOLUTION RESPIRATORY (INHALATION) at 15:42

## 2020-02-29 RX ADMIN — IPRATROPIUM BROMIDE AND ALBUTEROL SULFATE 3 ML: 2.5; .5 SOLUTION RESPIRATORY (INHALATION) at 08:16

## 2020-02-29 RX ADMIN — ACETAMINOPHEN 650 MG: 325 TABLET ORAL at 19:39

## 2020-02-29 RX ADMIN — ASPIRIN 81 MG 81 MG: 81 TABLET ORAL at 09:05

## 2020-02-29 RX ADMIN — SODIUM CHLORIDE, PRESERVATIVE FREE 10 ML: 5 INJECTION INTRAVENOUS at 09:07

## 2020-02-29 RX ADMIN — CARVEDILOL 25 MG: 25 TABLET, FILM COATED ORAL at 09:06

## 2020-02-29 RX ADMIN — FLUTICASONE PROPIONATE 2 SPRAY: 50 SPRAY, METERED NASAL at 09:06

## 2020-02-29 RX ADMIN — INSULIN LISPRO 2 UNITS: 100 INJECTION, SOLUTION INTRAVENOUS; SUBCUTANEOUS at 12:20

## 2020-02-29 ASSESSMENT — PAIN SCALES - GENERAL
PAINLEVEL_OUTOF10: 0
PAINLEVEL_OUTOF10: 0
PAINLEVEL_OUTOF10: 10
PAINLEVEL_OUTOF10: 0
PAINLEVEL_OUTOF10: 0

## 2020-02-29 NOTE — PROGRESS NOTES
for: PHART, PO2ART, SGR5EAK  INR: No results for input(s): INR in the last 72 hours. -----------------------------------------------------------------  RAD: Xr Chest Standard (2 Vw)    Result Date: 2020  Patient MRN: [id number] : 1945 Age:  76 years Gender: Female Order Date: 2020 12:15 PM Exam: XR CHEST (2 VW) Number of Images: 2 view Indication:   SOB SOB Comparison: 2019 FINDINGS: There is mild cardiomegaly which is stable. The pulmonary vascularity is normal. Lungs are clear. Neither costophrenic angle is blunted. Cardiomegaly. No new abnormal findings. Us Retroperitoneal Complete    Result Date: 2020  Patient Mrn: [id number] : 1945 Age:  76 years Gender: Female Order Date: 2020 5:15 PM Exam: US RETROPERITONEAL COMPLETE Number Of Images: 61 Views Indication:  Acute renal insufficiency Comparison: Previous renal ultrasound study 2017 TECHNIQUE: 2-D grayscale and color Doppler imaging were utilized for evaluation of the kidneys and bladder. Findings: The right kidney measures9.2 x 4.1 x 4.7 cm, and the left kidney measures 9.2 x 4.7 x 4.5 cm. There is no evidence of collecting system calcification, obstructive dilation, or perinephric inflammatory change. Cortical echogenicity is is uniform and normal bilaterally. A small exophytic left mid cortical cyst measures 7 mm diameter. . Central renal perfusion is intact bilaterally by color Doppler imaging. Images of the bladder show an estimated volume of 191. The post void residual was not measured. Bladder mural contours appear normal, and no intraluminal abnormalities are evident. No evidence of renal obstruction or inflammatory change. No upper urinary tract calcifications are evident. A small exophytic 7 mm diameter cyst is noted in the left mid kidney. The bladder is mildly distended at 1 91 mL, and shows no evidence of intraluminal pathology form mural abnormality.       Objective:   Vitals: BP use  Urine studies suggestive of pre-renal etiology  Caution with excessive IVF in setting of CHF, continue IV NS 50cc/hr  Hold lisinopril  Monitor I's/O's, urine output  No acidosis or electrolyte abnormalities to warrant HD at this time  Azotemia noted      2.) HFrEF  Not in acute exacerbation  On GDMT  Clinically dry  CXR clear, not suggestive of volume overload  Continue GDMT per primary, hold lisinopril  Daily weights and strict I's/O's     3.) Chronic A fib  Rate presently controlled  Continue Eliquis for CHADSVASC 6     4.) IDDM2  Continue to monitor BG per primary  Cover hyperglycemia with sliding scale insulin  Carb control/Renal diet     5.) CAD  S/p CABG 1997, s/p PCI 2013  Continue ASA, Lipitor      6.) Non-anion gap acidosis  Secondary to CKD     7.) Hyperkalemia better   Continue to monitor K on morning BMP       Marcus Ness Listen

## 2020-02-29 NOTE — PLAN OF CARE
Problem: Falls - Risk of:  Goal: Will remain free from falls  Description  Will remain free from falls  Outcome: Met This Shift  Goal: Absence of physical injury  Description  Absence of physical injury  Outcome: Met This Shift     Problem: Breathing Pattern - Ineffective:  Goal: Ability to achieve and maintain a regular respiratory rate will improve  Description  Ability to achieve and maintain a regular respiratory rate will improve  2/28/2020 2241 by Kathy Suazo RN  Outcome: Met This Shift  2/28/2020 1025 by Tawnya Michael RN  Outcome: Met This Shift     Problem: Pain:  Goal: Pain level will decrease  Description  Pain level will decrease  Outcome: Not Met This Shift  Goal: Control of acute pain  Description  Control of acute pain  Outcome: Not Met This Shift     Problem: Risk for Impaired Skin Integrity  Goal: Tissue integrity - skin and mucous membranes  Description  Structural intactness and normal physiological function of skin and  mucous membranes.   2/28/2020 2241 by Kathy Suazo RN  Outcome: Not Met This Shift  2/28/2020 1025 by Tawnya Michael RN  Outcome: Met This Shift

## 2020-03-01 LAB
ANION GAP SERPL CALCULATED.3IONS-SCNC: 11 MMOL/L (ref 7–16)
BLOOD CULTURE, ROUTINE: NORMAL
BUN BLDV-MCNC: 88 MG/DL (ref 8–23)
CALCIUM SERPL-MCNC: 8.2 MG/DL (ref 8.6–10.2)
CHLORIDE BLD-SCNC: 108 MMOL/L (ref 98–107)
CO2: 20 MMOL/L (ref 22–29)
CREAT SERPL-MCNC: 1.9 MG/DL (ref 0.5–1)
CULTURE, BLOOD 2: NORMAL
GFR AFRICAN AMERICAN: 31
GFR NON-AFRICAN AMERICAN: 31 ML/MIN/1.73
GLUCOSE BLD-MCNC: 161 MG/DL (ref 74–99)
METER GLUCOSE: 112 MG/DL (ref 74–99)
METER GLUCOSE: 157 MG/DL (ref 74–99)
METER GLUCOSE: 188 MG/DL (ref 74–99)
METER GLUCOSE: 207 MG/DL (ref 74–99)
METER GLUCOSE: 53 MG/DL (ref 74–99)
POTASSIUM SERPL-SCNC: 4.8 MMOL/L (ref 3.5–5)
SODIUM BLD-SCNC: 139 MMOL/L (ref 132–146)

## 2020-03-01 PROCEDURE — 6370000000 HC RX 637 (ALT 250 FOR IP): Performed by: INTERNAL MEDICINE

## 2020-03-01 PROCEDURE — 36415 COLL VENOUS BLD VENIPUNCTURE: CPT

## 2020-03-01 PROCEDURE — 82962 GLUCOSE BLOOD TEST: CPT

## 2020-03-01 PROCEDURE — 80048 BASIC METABOLIC PNL TOTAL CA: CPT

## 2020-03-01 PROCEDURE — APPSS180 APP SPLIT SHARED TIME > 60 MINUTES: Performed by: NURSE PRACTITIONER

## 2020-03-01 PROCEDURE — 2060000000 HC ICU INTERMEDIATE R&B

## 2020-03-01 PROCEDURE — 93005 ELECTROCARDIOGRAM TRACING: CPT | Performed by: INTERNAL MEDICINE

## 2020-03-01 PROCEDURE — 94640 AIRWAY INHALATION TREATMENT: CPT

## 2020-03-01 PROCEDURE — 99222 1ST HOSP IP/OBS MODERATE 55: CPT | Performed by: INTERNAL MEDICINE

## 2020-03-01 PROCEDURE — 2580000003 HC RX 258: Performed by: INTERNAL MEDICINE

## 2020-03-01 RX ADMIN — APIXABAN 5 MG: 5 TABLET, FILM COATED ORAL at 07:48

## 2020-03-01 RX ADMIN — ATORVASTATIN CALCIUM 40 MG: 40 TABLET, FILM COATED ORAL at 07:48

## 2020-03-01 RX ADMIN — FLUTICASONE PROPIONATE 2 SPRAY: 50 SPRAY, METERED NASAL at 07:48

## 2020-03-01 RX ADMIN — ACETAMINOPHEN 650 MG: 325 TABLET ORAL at 02:28

## 2020-03-01 RX ADMIN — HYDRALAZINE HYDROCHLORIDE 25 MG: 25 TABLET, FILM COATED ORAL at 22:41

## 2020-03-01 RX ADMIN — INSULIN LISPRO 15 UNITS: 100 INJECTION, SOLUTION INTRAVENOUS; SUBCUTANEOUS at 06:27

## 2020-03-01 RX ADMIN — LEVOTHYROXINE SODIUM 75 MCG: 0.07 TABLET ORAL at 07:48

## 2020-03-01 RX ADMIN — HYDRALAZINE HYDROCHLORIDE 25 MG: 25 TABLET, FILM COATED ORAL at 15:41

## 2020-03-01 RX ADMIN — INSULIN LISPRO 2 UNITS: 100 INJECTION, SOLUTION INTRAVENOUS; SUBCUTANEOUS at 07:49

## 2020-03-01 RX ADMIN — INSULIN LISPRO 2 UNITS: 100 INJECTION, SOLUTION INTRAVENOUS; SUBCUTANEOUS at 22:42

## 2020-03-01 RX ADMIN — IPRATROPIUM BROMIDE AND ALBUTEROL SULFATE 3 ML: 2.5; .5 SOLUTION RESPIRATORY (INHALATION) at 08:41

## 2020-03-01 RX ADMIN — CARVEDILOL 25 MG: 25 TABLET, FILM COATED ORAL at 18:01

## 2020-03-01 RX ADMIN — IPRATROPIUM BROMIDE AND ALBUTEROL SULFATE 3 ML: 2.5; .5 SOLUTION RESPIRATORY (INHALATION) at 22:21

## 2020-03-01 RX ADMIN — APIXABAN 5 MG: 5 TABLET, FILM COATED ORAL at 22:41

## 2020-03-01 RX ADMIN — IPRATROPIUM BROMIDE AND ALBUTEROL SULFATE 3 ML: 2.5; .5 SOLUTION RESPIRATORY (INHALATION) at 18:27

## 2020-03-01 RX ADMIN — ACETAMINOPHEN 650 MG: 325 TABLET ORAL at 22:42

## 2020-03-01 RX ADMIN — CARVEDILOL 25 MG: 25 TABLET, FILM COATED ORAL at 07:48

## 2020-03-01 RX ADMIN — ASPIRIN 81 MG 81 MG: 81 TABLET ORAL at 07:48

## 2020-03-01 RX ADMIN — ACETAMINOPHEN 650 MG: 325 TABLET ORAL at 18:00

## 2020-03-01 RX ADMIN — HYDRALAZINE HYDROCHLORIDE 25 MG: 25 TABLET, FILM COATED ORAL at 07:48

## 2020-03-01 RX ADMIN — SODIUM CHLORIDE, PRESERVATIVE FREE 10 ML: 5 INJECTION INTRAVENOUS at 22:42

## 2020-03-01 RX ADMIN — IPRATROPIUM BROMIDE AND ALBUTEROL SULFATE 3 ML: 2.5; .5 SOLUTION RESPIRATORY (INHALATION) at 13:00

## 2020-03-01 RX ADMIN — FAMOTIDINE 10 MG: 20 TABLET ORAL at 07:47

## 2020-03-01 ASSESSMENT — PAIN SCALES - WONG BAKER: WONGBAKER_NUMERICALRESPONSE: 0

## 2020-03-01 ASSESSMENT — PAIN SCALES - GENERAL
PAINLEVEL_OUTOF10: 8
PAINLEVEL_OUTOF10: 2
PAINLEVEL_OUTOF10: 7
PAINLEVEL_OUTOF10: 4
PAINLEVEL_OUTOF10: 4

## 2020-03-01 ASSESSMENT — PAIN DESCRIPTION - PROGRESSION: CLINICAL_PROGRESSION: NOT CHANGED

## 2020-03-01 ASSESSMENT — PAIN DESCRIPTION - ORIENTATION: ORIENTATION: RIGHT;LEFT;MID

## 2020-03-01 ASSESSMENT — PAIN DESCRIPTION - ONSET: ONSET: ON-GOING

## 2020-03-01 ASSESSMENT — PAIN DESCRIPTION - FREQUENCY: FREQUENCY: CONTINUOUS

## 2020-03-01 ASSESSMENT — PAIN DESCRIPTION - LOCATION: LOCATION: BACK;ARM;LEG;NECK

## 2020-03-01 ASSESSMENT — PAIN - FUNCTIONAL ASSESSMENT: PAIN_FUNCTIONAL_ASSESSMENT: PREVENTS OR INTERFERES WITH MANY ACTIVE NOT PASSIVE ACTIVITIES

## 2020-03-01 ASSESSMENT — PAIN DESCRIPTION - PAIN TYPE: TYPE: CHRONIC PAIN

## 2020-03-01 NOTE — CONSULTS
class 3 and ACC/AHA stage C ([facility] [address] 1/16/2017    COPD (chronic obstructive pulmonary disease) (HCC)     Dementia (HCC)     patient denies, per Dr Cole Conroy, will remove Dx    Diabetes mellitus ([facility] [address]     Diverticular disease     Diverticulitis     Dry eye     follows with Dr. Scottie Ricketts at [facility] Elevated plasma metanephrines     Esophagitis, [place] grade A 12/2013    Fecal incontinence 5/9/2017    Dr Cabrera Berumen Fibromyalgia     Gastritis     Gastroesophageal reflux disease without esophagitis 1/16/2017    Generalized OA     GI bleed     Hiatal hernia     4 cm-Dr Cabrera Berumen History of methicillin resistant Staphylococcus aureus infection 12/1/2011    Hyperlipidemia     Hyperplastic colon polyp 2008    Hypertension     Hypokalemia     Hypothyroidism     Iron deficiency anemia     Left atrial dilation 2/14/2017    Lumbar herniated disc     LVH (left ventricular hypertrophy) 1/16/2017    Microalbuminuria 4/22/2012    STACEY (obstructive sleep apnea)     currently not using CPAP    Osteoporosis     Peripheral neuropathy     Post laminectomy syndrome     Primary osteoarthritis involving multiple joints 5/11/2017    Primary osteoarthritis of both knees 3/3/2017    Proteinuria     Pulmonary hypertension ([facility] [address] 1/16/2017    Syncope     Tobacco abuse     Valvular heart disease 1/16/2017    Dilated RV, thickened MV, mod-severe MR    Vitamin D deficiency      Past Medical History:    3. CABG x 4 [facility] in 1997 SVG to OM, SVG to diagonal, SVG to PDA and LIMA to LAD. 4.       Cath 12/2012 [facility]: KIMANI to distal RCA. Stents in the mid to distal LAD are widely patent without significant in-stent restenosis. SEVERE 3 vessel disease with occluded 3 out of 4 bypass grafts.   5.         Cath 9/2013 [facility]: Native 3 vessel CAD with patent LIMA to LAD and patent stent in RCA with 3 occluded vein grafts. FAILED percutaneous intervention of the native circumflex, chronic total occulusion.  Recommendations: continued rollator. She had smoked for over 50 years, and smoked on average 1 pack every three days  She denies alcohol or illicit drug use   She lives with her daughter     Family History: Mother  of breast cancer and was a diabetic  One sister with history of diabetes      Past Surgical History:    Past Surgical History:   Procedure Laterality Date    APPENDECTOMY      reportedly done at time of last  in [facility]  07/2003    x2   [address]  2012    [address] [facility]  , ,    [address] Bilateral     done at [facility]      x4    COLONOSCOPY  2014, 2008, 2003    [facility] Dr Estrella Valencia-hyperplastic polyp, sigmoid diverticula, external hemorrhoid-repeat 5 yrs (2019)    CORONARY ANGIOPLASTY WITH STENT PLACEMENT  2013    [facility]    CORONARY ARTERY BYPASS GRAFT      quadruple bypass @ [address] [facility]  2016    LVH EF 45-50%   Елена Craven      Dr. Petros Obando in [address] [place] [address]  5/20/15, 2013, 13    Dr Ceci Byrd @ [facility]  2017    Dr Tobar-GERD/HIATAL HERNIA/gastritis       Medications Prior to admit:  Prior to Admission medications    Medication Sig Start Date End Date Taking?  Authorizing Provider   insulin glargine [facility]) 100 UNIT/ML injection pen Inject 75 Units into the skin nightly 20  Yes Kelechi Plumas, DO   insulin lispro (HUMALOG) 100 UNIT/ML injection vial Inject 15 Units into the skin 3 times daily (before meals) 20  Yes Kelechi Plumas, DO   hydrALAZINE (APRESOLINE) 25 MG tablet Take 1 tablet by mouth 3 times daily 20  Yes Kelechi Plumas, DO   atorvastatin (LIPITOR) 40 MG tablet Take 1 tablet by mouth daily 20  Yes Saige Garduno, DO   levothyroxine (LEVOTHROID) 75 MCG tablet Take 1 tablet by mouth daily 20 Yes Leydi Alfonso, DO   apixaban (ELIQUIS) 5 MG TABS tablet Take 1 tablet by mouth 2 times daily 1/13/20  Yes Leydi Alfonso DO   albuterol sulfate  (90 Base) MCG/ACT inhaler Inhale 2 puffs into the lungs every 6 hours as needed for Wheezing 1/6/20  Yes Leydi Alfonso DO   famotidine (PEPCID) 20 MG tablet Take 1 tablet by mouth daily 1/3/20  Yes Leydi Alfonso DO   acetaminophen (TYLENOL) 325 MG tablet Take 2 tablets by mouth every 4 hours as needed for Fever (For temp greater than 100.5 F (38 C)) 9/30/19  Yes Precilla Brand, DO   ipratropium-albuterol (DUONEB) 0.5-2.5 (3) MG/3ML SOLN nebulizer solution Inhale 3 mLs into the lungs every 4 hours (while awake) 9/30/19  Yes Precilla Brand, DO   carvedilol (COREG) 25 MG tablet Take 1 tablet by mouth 2 times daily (with meals) 8/22/19  Yes Davin Mason DO   fluticasone (FLONASE) 50 MCG/ACT nasal spray 2 sprays by Nasal route every morning 8/22/19  Yes Davin Mason DO   aspirin 81 MG tablet Take 81 mg by mouth every morning    Yes Historical Provider, MD   nitroGLYCERIN (NITROSTAT) 0.4 MG SL tablet Place 1 tablet under the tongue every 5 minutes as needed for Chest pain up to max of 3 total doses. If no relief after 1 dose, call 911.   Patient not taking: Reported on 2/20/2020 12/16/19   Leydi Alfonso DO   loperamide (IMODIUM) 2 MG capsule Take 1 capsule by mouth 4 times daily as needed for Diarrhea  Patient not taking: Reported on 2/20/2020 8/22/19   Hank Duarte DO       Current Medications:    Current Facility-Administered Medications: hydrALAZINE (APRESOLINE) tablet 25 mg, 25 mg, Oral, TID  insulin glargine (LANTUS) injection vial 75 Units, 75 Units, Subcutaneous, Nightly  benzocaine-menthol (CEPACOL SORE THROAT) lozenge 1 lozenge, 1 lozenge, Oral, Q2H PRN  acetaminophen (TYLENOL) tablet 650 mg, 650 mg, Oral, Q4H PRN  albuterol sulfate  (90 Base) MCG/ACT inhaler 2 puff, 2 puff, Inhalation, Q6H PRN  apixaban (ELIQUIS) tablet 5 mg, 5 mg, Oral, BID  aspirin chewable tablet 81 mg, 81 mg, Oral, QAM  atorvastatin (LIPITOR) tablet 40 mg, 40 mg, Oral, Daily  carvedilol (COREG) tablet 25 mg, 25 mg, Oral, BID WC  famotidine (PEPCID) tablet 10 mg, 10 mg, Oral, Daily  fluticasone (FLONASE) 50 MCG/ACT nasal spray 2 spray, 2 spray, Nasal, QAM  ipratropium-albuterol (DUONEB) nebulizer solution 3 mL, 1 vial, Inhalation, Q4H WA  levothyroxine (SYNTHROID) tablet 75 mcg, 75 mcg, Oral, Daily  insulin lispro (HUMALOG) injection vial 15 Units, 15 Units, Subcutaneous, TID AC  sodium chloride flush 0.9 % injection 10 mL, 10 mL, Intravenous, 2 times per day  sodium chloride flush 0.9 % injection 10 mL, 10 mL, Intravenous, PRN  polyethylene glycol (GLYCOLAX) packet 17 g, 17 g, Oral, Daily PRN  ondansetron (ZOFRAN) injection 4 mg, 4 mg, Intravenous, Q6H PRN  0.9 % sodium chloride infusion, , Intravenous, Continuous  insulin lispro (HUMALOG) injection vial 0-12 Units, 0-12 Units, Subcutaneous, TID WC  insulin lispro (HUMALOG) injection vial 0-6 Units, 0-6 Units, Subcutaneous, Nightly  glucose (GLUTOSE) 40 % oral gel 15 g, 15 g, Oral, PRN  dextrose 50 % IV solution, 12.5 g, Intravenous, PRN  glucagon (rDNA) injection 1 mg, 1 mg, Intramuscular, PRN  dextrose 5 % solution, 100 mL/hr, Intravenous, PRN    Allergies: Iv [iodides]; Codeine; Cymbalta [duloxetine hcl]; Gabapentin; Hydrocodone; Morphine; Pradaxa [dabigatran etexilate mesylate];  Oxycontin [oxycodone hcl]; and Penicillins    Social History:    Social History     Socioeconomic History    Marital status:      Spouse name: Not on file    Number of children: 3    Years of education: Not on file    Highest education level: Not on file   Occupational History    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[facility] [employer] resource strain: Not very hard    Food insecurity:     Worry: Not on file     Inability: Not on file   Runtastic needs: Medical: Not on file     Non-medical: Not on file   Tobacco Use    Smoking status: Former Smoker     Packs/day: 0.50     Years: 55.00     Pack years: 27.50     Types: Cigarettes     Start date: 1961     Last attempt to quit: 2016     Years since quittin.1    Smokeless tobacco: Never Used   Substance and Sexual Activity    Alcohol use: Not Currently    Drug use: Never     Comment: denies    Sexual activity: Not Currently     Partners: Male   Lifestyle    Physical activity:     Days per week: Not on file     Minutes per session: Not on file    Stress: Not on file   Relationships    Social connections:     Talks on phone: Not on file     Gets together: Not on file     Attends Shinto service: Not on file     Active member of club or organization: Not on file     Attends meetings of clubs or organizations: Not on file     Relationship status: Not on file    Intimate partner violence:     Fear of current or ex partner: Not on file     Emotionally abused: Not on file     Physically abused: Not on file     Forced sexual activity: Not on file   Other Topics Concern    Not on file   Social History Narrative    She ambulates with a cane at times, and sometimes a rollator. She had smoked for over 50 years, and smoked on average 1 pack every three days    She denies alcohol or illicit drug use     She lives with her daughter                 Moved from [place], [place] to [place] and lives with daughter Victorina Engel        Nurse Navigator with [employer] insurance (Claudia Khan RN) [phone] (phone) [phone] (fax)            She ambulates with a cane at times, and sometimes a rollator. She had smoked for over 50 years, and smoked on average 1 pack every three days    She denies alcohol or illicit drug use     She lives with her daughter             Family History:     Mother  of breast cancer and was a diabetic    One sister with history of diabetes        Mother  of breast Inspection- shows no noted pulsations  Heart Palpation- no heaves or thrills; PMI is non-displaced   Heart Ausculation- Irregular rate and rhythm, no murmur. No s3, s4 or rub   PV: No lower extremity edema. No varicosities. Pedal pulses palpable, no clubbing or cyanosis   ABDOMEN: Soft, non-tender to light palpation. Bowel sounds present. No palpable masses no organomegaly; no abdominal bruit  MS: Good muscle strength and tone. No atrophy or abnormal movements. : Deferred  SKIN: Warm and dry no statis dermatitis or ulcers. Warm and well perfused. NEURO / PSYCH: Oriented to person, place and time. Speech clear and appropriate. Follows all commands. Pleasant affect       DATA:        Intake/Output Summary (Last 24 hours) at 3/1/2020 1132  Last data filed at 3/1/2020 0858  Gross per 24 hour   Intake 1606 ml   Output 750 ml   Net 856 ml       Labs:     BMP:   Recent Labs     20  0426 20  0433    139   K 5.0 4.8   CO2 18* 20*   * 88*   CREATININE 2.5* 1.9*   LABGLOM 23 31   CALCIUM 8.3* 8.2*     HgA1c:   Lab Results   Component Value Date    LABA1C 8.2 01/15/2020     FASTING LIPID PANEL:  Lab Results   Component Value Date    CHOL 141 2019    HDL 47 2019    LDLCALC 82 2019    TRIG 58 2019     IMAGING:    CXR (2020)  FINDINGS:  There is mild cardiomegaly which is stable. The pulmonary vascularity is normal. Lungs are clear. Neither costophrenic angle is blunted. Impression  Cardiomegaly. No new abnormal findings.       CARDIAC TESTIN2018 TTE -    Ejection fraction biplane =53%.   The left atrium is severely dilated.   Atrial fibrillation   Elevated L atrial pressure   Severe posteriorly directed eccentric mitral regurgitation.   Probable MV posterior leaflet restriction   The aortic valve appears mildly sclerotic.   No pericardial effusion.     TTE (8/15/2019, Dr. Cindi Barreto)   Summary   Ejection fraction is visually estimated at 55%.   No regional wall motion abnormalities seen.   Mildly dilated right ventricle structure with reduced function. TAPSE is 1.5 cm.   Left atrial volume index of 67 ml per meters squared BSA.   At least moderate eccentric posteriorly directed mitral regurgitation is present.   Mild tricuspid regurgitation.   Pulmonary hypertension is moderate. RVSP is 65 mmHg.     MAYNOR (10/31/2019, Dr. Sy Nuñez)   Summary    Mitral regurgitation.   MR ERO 0.1 cm2.   MR RV 17 ml.   Moderate tricuspid regurgitation.   Normal left ventricular systolic function.   Visually estimated LVEF is 55 %.   Normal diastolic function.   Some artifact in the left atrial appendage with decreased emptying velocities.   Mildly dilated right ventricle, mild right ventricular dysfunction. RHC with cardioMEMS implant (11/2019)  PRESSURES:  1. Aorta RA 10/12 (9). 2.  RV 71/2, 7.  3.  PA 67/25 (41). 4.  PCW 21/24 (21). 5.  Pulmonary artery oxygen saturation 62.8%. 6.  Pulse oximetry 99%. 7.  Cardiac output 3.32 liters/minute. 8.  Cardiac intake 1.6 liters/minute/m2. Cardiac output/cardiac intakes by (BONNIE method). CONCLUSIONS:  1. Elevated right-sided pressures consistent with moderate-to-severe  pulmonary hypertension. 2.  Reduced cardiac output, cardiac index, and pulmonary artery oxygen  saturation. 3.  Elevated pulmonary artery capillary wedge pressure. 4.  Successful placement of CardioMEMS heart failure monitoring device  in the left pulmonary artery. EKG  Atrial fibrillation         ASSESSMENT:  1. Atypical chest pain, musculoskeletal in nature. Currently pain free. 2. CAD s/p CABG. [facility] (9/2013, [address]) native 3 vessel CAD with patent LIMA-LAD and patent stent in RCA with 3 occluded vein grafts, failed percutaneous intervention of the native circumflex, chronic total occulusion (medical management). 3. Chronic HFpEF. NYHA class III - appears euvolemic. Has been off her diuretic this admission due to DARVIN in the setting of poor oral intake.    4. DARVIN on top of CKD - resolved with IVF   5. Mitral regurgitation - Mild with ERO 0.1 per MAYNOR. 6. Chronic atrial fibrillation - anticoagulated with Eliquis  7. Hypertension - controlled   8. COPD  9. T2DM with peripheral neuropathy - recent history of hyperglycemia   10. Significant MSK issues  11. Hypothyroidism   12. Dyslipidemia   13. Chronic dizziness has prn antivert   14. Morbid obesity   15. STACEY - noncompliant   16. IV dye allergy     PLAN:  1. Continue medical therapy   2. Volume management per renal  3. Will need some form of diuretic therapy restated prior to discharge  4. Research team is aware of admission and will continue to monitor cardioMEMS as OP    Above discussed with Dr. Elvia Peck    Electronically signed by REGGIE Marshall - CNP on 3/1/2020 at 11:32 AM     _______________________________________________________________________________________  I independently interviewed and examined the patient. I have reviewed the above documentation completed by the EVIE. Please see my additional contributions to the HPI, physical exam, and assessment / medical decision making. HPI, ROS, PMH, PSH, [address], SH, and medications independently reviewed (agree; see above documentation)    History of Present Illness:  Currently with no respiratory distress or palpitations.  +musculoskeletal chest pain on exam.    Review of Systems:   Cardiac: As per HPI  General: No fever, chills  Pulmonary: As per HPI  HEENT: No visual disturbances, difficult swallowing  GI: No nausea, vomiting  Musculoskeletal: TRONCOSO x 4, no focal motor deficits  Skin: Intact, no rashes  Neuro/Psych: No headache or seizures    Physical Exam:  BP (!) 126/59   Pulse 82   Temp 98 °F (36.7 °C) (Temporal)   Resp 16   Ht 5' 3.5\" (1.613 m)   Wt 238 lb (108 kg)   SpO2 98%   BMI 41.50 kg/m²   Wt Readings from Last 3 Encounters:   03/01/20 238 lb (108 kg)   02/20/20 238 lb (108 kg)   02/04/20 228 lb (103.4 kg)     Appearance: Awake, no acute respiratory distress  Skin: Intact, no rash  Head: Normocephalic, atraumatic  Eyes: EOMI, no conjunctival erythema  ENMT: No pharyngeal erythema, MMM, no rhinorrhea  Neck: Supple, no carotid bruits  Lungs: Decreased BS B/L, no wheezing  Cardiac: IRRR, no murmurs apparent  Abdomen: Soft, nontender, +bowel sounds  Extremities: Moves all extremities x 4, no lower extremity edema  Neurologic: No focal motor deficits apparent, normal mood and affect  Chest: +TTP over midsternum and left side of chest    Cardiac Tests:  Telemetry: rate controlled atrial fibrillation    - Chest pain this admisison is musculoskeletal  - Renal following -- BUN/Cr improving  - Outpatient follow-up with HF team    Thank you for allowing me to participate in your patient's care. Please feel free to contact me if you have any questions or concerns.     Cecilio Paulson MD  [facility]) Cardiology

## 2020-03-01 NOTE — PROGRESS NOTES
Subjective:    Awake and alert. Feels slightly better today. Denies chest pain less dyspnea. Denies abdominal pain. Tolerating diet. No nausea or vomiting. Objective:    /60   Pulse 66   Temp 98.1 °F (36.7 °C) (Temporal)   Resp 16   Ht 5' 3.5\" (1.613 m)   Wt 230 lb (104.3 kg)   SpO2 98%   BMI 40.10 kg/m²   Skin: Warm and dry  Neck: Supple, no JVD  Heart:  RRR, no murmurs, gallops, or rubs. Lungs:  CTA bilaterally, no wheeze, rales or rhonchi  Abd: bowel sounds present, nontender, nondistended, no masses  Extrem:  No clubbing, cyanosis, or edema, pulses intact    I/O last 3 completed shifts: In: 500 [P.O.:120; I.V.:380]  Out: 500 [Urine:500]    Laboratory:     CBC with Differential:    Lab Results   Component Value Date    WBC 12.5 02/25/2020    RBC 4.38 02/25/2020    HGB 11.8 02/25/2020    HCT 37.7 02/25/2020     02/25/2020    MCV 86.1 02/25/2020    MCH 26.9 02/25/2020    MCHC 31.3 02/25/2020    RDW 14.9 02/25/2020    LYMPHOPCT 7.9 02/25/2020    MONOPCT 7.4 02/25/2020    BASOPCT 0.0 02/25/2020    MONOSABS 0.92 02/25/2020    LYMPHSABS 0.98 02/25/2020    EOSABS 0.00 02/25/2020    BASOSABS 0.00 02/25/2020     CMP:    Lab Results   Component Value Date     02/29/2020    K 5.0 02/29/2020    K 5.1 02/07/2018     02/29/2020    CO2 18 02/29/2020     02/29/2020    CREATININE 2.5 02/29/2020    GFRAA 23 02/29/2020    LABGLOM 23 02/29/2020    GLUCOSE 117 02/29/2020    PROT 7.1 02/25/2020    LABALBU 4.0 02/25/2020    CALCIUM 8.3 02/29/2020    BILITOT 0.5 02/25/2020    ALKPHOS 70 02/25/2020    AST 9 02/25/2020    ALT 13 02/25/2020        US RETROPERITONEAL COMPLETE   Final Result   No evidence of renal obstruction or inflammatory change. No upper   urinary tract calcifications are evident. A small exophytic 7 mm   diameter cyst is noted in the left mid kidney.       The bladder is mildly distended at 1 91 mL, and shows no evidence of   intraluminal pathology form mural Units Subcutaneous TID [facility] Hailee, DO   15 Units at 02/29/20 1253    sodium chloride flush 0.9 % injection 10 mL  10 mL Intravenous 2 times per day Jacqueline Patric Hailee, DO   10 mL at 02/29/20 1137    sodium chloride flush 0.9 % injection 10 mL  10 mL Intravenous PRN Jacqueline Stephen, DO        polyethylene glycol [facility]) packet 17 g  17 g Oral Daily PRN Jacqueline Stephen, DO        ondansetron [facility]) injection 4 mg  4 mg Intravenous Q6H PRN Jacqueline Stephen, DO   4 mg at 02/27/20 1145    0.9 % sodium chloride infusion   Intravenous Continuous Jacqueline Patric Hailee, DO 50 mL/hr at 02/29/20 1727      insulin lispro (HUMALOG) injection vial 0-12 Units  0-12 Units Subcutaneous TID [facility] Jacqueline Patric Hailee, DO   2 Units at 02/29/20 1220    insulin lispro (HUMALOG) injection vial 0-6 Units  0-6 Units Subcutaneous Nightly Jacqueline Patric Hailee, DO   2 Units at 02/28/20 2207    glucose (GLUTOSE) 40 % oral gel 15 g  15 g Oral PRN Jacqueline Patric Hailee, DO        dextrose 50 % IV solution  12.5 g Intravenous PRN Jacqueline Patric Hailee, DO        glucagon (rDNA) injection 1 mg  1 mg Intramuscular PRN Jacqueline Stephen, DO        dextrose 5 % solution  100 mL/hr Intravenous PRN Meghna Jay, DO               Problem list:    Principal Problem:    DARVIN (acute kidney injury) ([facility] [address]  Active Problems:    Permanent atrial fibrillation    HTN (hypertension)    Hyperlipidemia    CAD (coronary artery disease)    Hypothyroidism    Gastroesophageal reflux disease    COPD (chronic obstructive pulmonary disease) ([facility])    Obstructive sleep apnea    Generalized OA    Chronic heart failure with preserved ejection fraction (HFpEF) ([facility])    Mitral regurgitation    Type 2 diabetes mellitus, with long-term current use of insulin ([facility])    Chronic anticoagulation    CKD (chronic kidney disease) stage 4, GFR 15-29 ml/min ([facility])    Obesity  Resolved Problems:    * No resolved hospital problems.  *      Assessment:    Acute on chronic renal failure likely prerenal    Chronic diastolic heart failure    Chronic atrial fibrillation    Obstructive sleep apnea    Diabetes mellitus type 2, controlled    Essential hypertension    Hyperlipidemia    CAD    Hypothyroidism    Plan:    Continue cautious hydration    Adjust hydralazine    Adjust insulin    Nephrology input appreciated      Sarai Philip D.O.  7:51 PM  2/29/2020

## 2020-03-01 NOTE — PROGRESS NOTES
inflammatory change. No upper   urinary tract calcifications are evident. A small exophytic 7 mm   diameter cyst is noted in the left mid kidney. The bladder is mildly distended at 1 91 mL, and shows no evidence of   intraluminal pathology form mural abnormality. XR CHEST STANDARD (2 VW)   Final Result   Cardiomegaly. No new abnormal findings. Prior to Admission medications    Medication Sig Start Date End Date Taking?  Authorizing Provider   insulin glargine [facility]) 100 UNIT/ML injection pen Inject 75 Units into the skin nightly 2/28/20  Yes Jordyn Mendieta DO   insulin lispro (HUMALOG) 100 UNIT/ML injection vial Inject 15 Units into the skin 3 times daily (before meals) 2/28/20  Yes Jordyn Mendieta DO   hydrALAZINE (APRESOLINE) 25 MG tablet Take 1 tablet by mouth 3 times daily 2/28/20  Yes Jordyn Mendieta DO   atorvastatin (LIPITOR) 40 MG tablet Take 1 tablet by mouth daily 1/13/20  Yes Jemma Montemayor DO   levothyroxine (LEVOTHROID) 75 MCG tablet Take 1 tablet by mouth daily 1/13/20  Yes Jemma Montemayor DO   apixaban (ELIQUIS) 5 MG TABS tablet Take 1 tablet by mouth 2 times daily 1/13/20  Yes Jemma Montemayor DO   albuterol sulfate  (90 Base) MCG/ACT inhaler Inhale 2 puffs into the lungs every 6 hours as needed for Wheezing 1/6/20  Yes Jemma Montemayor DO   famotidine (PEPCID) 20 MG tablet Take 1 tablet by mouth daily 1/3/20  Yes Jemma Montemayor DO   acetaminophen (TYLENOL) 325 MG tablet Take 2 tablets by mouth every 4 hours as needed for Fever (For temp greater than 100.5 F (38 C)) 9/30/19  Yes Jordyn Mendieta DO   ipratropium-albuterol (DUONEB) 0.5-2.5 (3) MG/3ML SOLN nebulizer solution Inhale 3 mLs into the lungs every 4 hours (while awake) 9/30/19  Yes Jordyn Mendieta DO   carvedilol (COREG) 25 MG tablet Take 1 tablet by mouth 2 times daily (with meals) 8/22/19  Yes Davin Mason,    fluticasone (FLONASE) 50 MCG/ACT nasal spray 2 sprays by Nasal route every nebulizer solution 3 mL  1 vial Inhalation Q4H [place] Ac Nyevgeny, DO   3 mL at 03/01/20 1300    levothyroxine (SYNTHROID) tablet 75 mcg  75 mcg Oral Daily Earmon Odalys Nymer, DO   75 mcg at 03/01/20 1880    insulin lispro (HUMALOG) injection vial 15 Units  15 Units Subcutaneous TID [facility], DO   15 Units at 03/01/20 1378    sodium chloride flush 0.9 % injection 10 mL  10 mL Intravenous 2 times per day [facility] Odalys Malmer, DO   10 mL at 02/29/20 4484    sodium chloride flush 0.9 % injection 10 mL  10 mL Intravenous PRN Earmon Grams Malmer, DO        polyethylene glycol (GLYCOLAX) packet 17 g  17 g Oral Daily PRN [facility] An[facility], DO        ondansetron [facility]) injection 4 mg  4 mg Intravenous Q6H PRN Earmon Grams Anantmer, DO   4 mg at 02/27/20 1145    0.9 % sodium chloride infusion   Intravenous Continuous Earmon Grams Anantmer, DO 50 mL/hr at 02/29/20 1727      insulin lispro (HUMALOG) injection vial 0-12 Units  0-12 Units Subcutaneous TID [facility] Earmon Odalys [facility], DO   2 Units at 03/01/20 0744    insulin lispro (HUMALOG) injection vial 0-6 Units  0-6 Units Subcutaneous Nightly [facility] Odalys [facility], DO   2 Units at 02/28/20 2207    glucose (GLUTOSE) 40 % oral gel 15 g  15 g Oral PRN Earmon Grams Malmer, DO        dextrose 50 % IV solution  12.5 g Intravenous PRN Earmon Grams Malmer, DO        glucagon (rDNA) injection 1 mg  1 mg Intramuscular PRN Earmon Grams Malmer, DO        dextrose 5 % solution  100 mL/hr Intravenous PRN [facility], DO               Problem list:    Principal Problem:    DARVIN (acute kidney injury) ([facility] [address]  Active Problems:    Permanent atrial fibrillation    HTN (hypertension)    Hyperlipidemia    CAD (coronary artery disease)    Hypothyroidism    Gastroesophageal reflux disease    COPD (chronic obstructive pulmonary disease) ([facility])    Obstructive sleep apnea    Generalized OA    Chronic heart failure with preserved ejection fraction (HFpEF) ([facility])    Mitral regurgitation    Type 2 diabetes mellitus, with long-term current use of insulin (HCC)    Chronic anticoagulation    CKD (chronic kidney disease) stage 4, GFR 15-29 ml/min ([facility])    Obesity  Resolved Problems:    * No resolved hospital problems.  *      Assessment:    Acute on chronic renal failure likely prerenal     Chronic diastolic heart failure     Chronic atrial fibrillation     Obstructive sleep apnea     Diabetes mellitus type 2, controlled     Essential hypertension     Hyperlipidemia     CAD     Hypothyroidism    Atypical chest pain    Plan:    Cardiology input appreciated    Continue heart failure management as per protocol    Continue current insulin regimen    Fluids antihypertensive therapy as per nephrology      Yessenia Petersen D.O., [facility]  4:21 PM  3/1/2020

## 2020-03-01 NOTE — PLAN OF CARE
Problem: Falls - Risk of:  Goal: Will remain free from falls  Description  Will remain free from falls  Outcome: Met This Shift     Problem: Falls - Risk of:  Goal: Absence of physical injury  Description  Absence of physical injury  Outcome: Met This Shift     Problem: Pain:  Goal: Pain level will decrease  Description  Pain level will decrease  Outcome: Met This Shift     Problem: Pain:  Goal: Control of acute pain  Description  Control of acute pain  Outcome: Met This Shift     Problem: Pain:  Goal: Control of chronic pain  Description  Control of chronic pain  Outcome: Met This Shift     Problem: Risk for Impaired Skin Integrity  Goal: Tissue integrity - skin and mucous membranes  Description  Structural intactness and normal physiological function of skin and  mucous membranes.   Outcome: Met This Shift     Problem: Breathing Pattern - Ineffective:  Goal: Ability to achieve and maintain a regular respiratory rate will improve  Description  Ability to achieve and maintain a regular respiratory rate will improve  Outcome: Met This Shift

## 2020-03-01 NOTE — PROGRESS NOTES
Progress Note  3/1/2020 10:20 AM  Subjective:   Admit Date: 2/25/2020  PCP: Seun Rdz,   Interval History: Patient examined doing much better feels ok , still some weakness     Diet: DIET CARB CONTROL;    Data:   Scheduled Meds:   hydrALAZINE  25 mg Oral TID    insulin glargine  75 Units Subcutaneous Nightly    apixaban  5 mg Oral BID    aspirin  81 mg Oral QAM    atorvastatin  40 mg Oral Daily    carvedilol  25 mg Oral BID WC    famotidine  10 mg Oral Daily    fluticasone  2 spray Nasal QAM    ipratropium-albuterol  1 vial Inhalation Q4H WA    levothyroxine  75 mcg Oral Daily    insulin lispro  15 Units Subcutaneous TID AC    sodium chloride flush  10 mL Intravenous 2 times per day    insulin lispro  0-12 Units Subcutaneous TID WC    insulin lispro  0-6 Units Subcutaneous Nightly     Continuous Infusions:   sodium chloride 50 mL/hr at 02/29/20 1727    dextrose       PRN Meds:benzocaine-menthol, acetaminophen, albuterol sulfate HFA, sodium chloride flush, polyethylene glycol, ondansetron, glucose, dextrose, glucagon (rDNA), dextrose  I/O last 3 completed shifts: In: 1018 [P.O.:240; I.V.:886]  Out: 300 [Urine:300]  I/O this shift: In: 480 [P.O.:480]  Out: 450 [Urine:450]    Intake/Output Summary (Last 24 hours) at 3/1/2020 1020  Last data filed at 3/1/2020 0858  Gross per 24 hour   Intake 1606 ml   Output 750 ml   Net 856 ml     CBC: No results for input(s): WBC, HGB, PLT in the last 72 hours. BMP:    Recent Labs     02/28/20  0424 02/29/20  0426 03/01/20  0433    138 139   K 5.5* 5.0 4.8    106 108*   CO2 20* 18* 20*   * 107* 88*   CREATININE 3.1* 2.5* 1.9*   GLUCOSE 238* 117* 161*     Hepatic: No results for input(s): AST, ALT, ALB, BILITOT, ALKPHOS in the last 72 hours. Troponin: No results for input(s): TROPONINI in the last 72 hours. BNP: No results for input(s): BNP in the last 72 hours. Lipids: No results for input(s): CHOL, HDL in the last 72 hours.     Invalid decreased PO fluid intake and diuretic use  Urine studies suggestive of pre-renal etiology  Caution with excessive IVF in setting of CHF, continue IV NS 50cc/hr  Hold lisinopril  Monitor I's/O's, urine output  No acidosis or electrolyte abnormalities to warrant HD at this time  Azotemia noted      2.) HFrEF  Not in acute exacerbation  On GDMT  Clinically dry  CXR clear, not suggestive of volume overload  Continue GDMT per primary, hold lisinopril  Daily weights and strict I's/O's     3.) Chronic A fib  Rate presently controlled  Continue Eliquis      4.) IDDM2  Continue to monitor BG per primary  Cover hyperglycemia with sliding scale insulin  Carb control/Renal diet     5.) CAD  S/p CABG 1997, s/p PCI 2013  Continue ASA, Lipitor      6.) Non-anion gap acidosis  Secondary to CKD     7.) Hyperkalemia better     Continue to monitor K on morning BMP          Marcus Hicks

## 2020-03-02 VITALS
OXYGEN SATURATION: 98 % | SYSTOLIC BLOOD PRESSURE: 122 MMHG | HEIGHT: 64 IN | BODY MASS INDEX: 41.06 KG/M2 | DIASTOLIC BLOOD PRESSURE: 80 MMHG | WEIGHT: 240.5 LBS | RESPIRATION RATE: 16 BRPM | TEMPERATURE: 98.1 F | HEART RATE: 80 BPM

## 2020-03-02 LAB
ANION GAP SERPL CALCULATED.3IONS-SCNC: 12 MMOL/L (ref 7–16)
BUN BLDV-MCNC: 69 MG/DL (ref 8–23)
CALCIUM SERPL-MCNC: 8.4 MG/DL (ref 8.6–10.2)
CHLORIDE BLD-SCNC: 114 MMOL/L (ref 98–107)
CO2: 21 MMOL/L (ref 22–29)
CREAT SERPL-MCNC: 1.7 MG/DL (ref 0.5–1)
GFR AFRICAN AMERICAN: 35
GFR NON-AFRICAN AMERICAN: 35 ML/MIN/1.73
GLUCOSE BLD-MCNC: 138 MG/DL (ref 74–99)
METER GLUCOSE: 101 MG/DL (ref 74–99)
METER GLUCOSE: 48 MG/DL (ref 74–99)
METER GLUCOSE: 90 MG/DL (ref 74–99)
METER GLUCOSE: 90 MG/DL (ref 74–99)
POTASSIUM SERPL-SCNC: 5.3 MMOL/L (ref 3.5–5)
SODIUM BLD-SCNC: 147 MMOL/L (ref 132–146)

## 2020-03-02 PROCEDURE — 80048 BASIC METABOLIC PNL TOTAL CA: CPT

## 2020-03-02 PROCEDURE — 94640 AIRWAY INHALATION TREATMENT: CPT

## 2020-03-02 PROCEDURE — 6370000000 HC RX 637 (ALT 250 FOR IP): Performed by: INTERNAL MEDICINE

## 2020-03-02 PROCEDURE — 36415 COLL VENOUS BLD VENIPUNCTURE: CPT

## 2020-03-02 PROCEDURE — 82962 GLUCOSE BLOOD TEST: CPT

## 2020-03-02 RX ORDER — INSULIN GLARGINE 100 [IU]/ML
70 INJECTION, SOLUTION SUBCUTANEOUS NIGHTLY
Status: DISCONTINUED | OUTPATIENT
Start: 2020-03-02 | End: 2020-03-02 | Stop reason: HOSPADM

## 2020-03-02 RX ADMIN — INSULIN LISPRO 15 UNITS: 100 INJECTION, SOLUTION INTRAVENOUS; SUBCUTANEOUS at 12:24

## 2020-03-02 RX ADMIN — CARVEDILOL 25 MG: 25 TABLET, FILM COATED ORAL at 18:18

## 2020-03-02 RX ADMIN — IPRATROPIUM BROMIDE AND ALBUTEROL SULFATE 3 ML: 2.5; .5 SOLUTION RESPIRATORY (INHALATION) at 12:53

## 2020-03-02 RX ADMIN — ATORVASTATIN CALCIUM 40 MG: 40 TABLET, FILM COATED ORAL at 08:27

## 2020-03-02 RX ADMIN — HYDRALAZINE HYDROCHLORIDE 25 MG: 25 TABLET, FILM COATED ORAL at 08:31

## 2020-03-02 RX ADMIN — IPRATROPIUM BROMIDE AND ALBUTEROL SULFATE 3 ML: 2.5; .5 SOLUTION RESPIRATORY (INHALATION) at 08:43

## 2020-03-02 RX ADMIN — FAMOTIDINE 10 MG: 20 TABLET ORAL at 08:27

## 2020-03-02 RX ADMIN — FLUTICASONE PROPIONATE 2 SPRAY: 50 SPRAY, METERED NASAL at 08:30

## 2020-03-02 RX ADMIN — ACETAMINOPHEN 650 MG: 325 TABLET ORAL at 17:57

## 2020-03-02 RX ADMIN — HYDRALAZINE HYDROCHLORIDE 25 MG: 25 TABLET, FILM COATED ORAL at 14:45

## 2020-03-02 RX ADMIN — ASPIRIN 81 MG 81 MG: 81 TABLET ORAL at 08:27

## 2020-03-02 RX ADMIN — INSULIN LISPRO 15 UNITS: 100 INJECTION, SOLUTION INTRAVENOUS; SUBCUTANEOUS at 08:28

## 2020-03-02 RX ADMIN — CARVEDILOL 25 MG: 25 TABLET, FILM COATED ORAL at 08:27

## 2020-03-02 RX ADMIN — LEVOTHYROXINE SODIUM 75 MCG: 0.07 TABLET ORAL at 08:27

## 2020-03-02 RX ADMIN — APIXABAN 5 MG: 5 TABLET, FILM COATED ORAL at 08:28

## 2020-03-02 ASSESSMENT — PAIN DESCRIPTION - PROGRESSION
CLINICAL_PROGRESSION: NOT CHANGED

## 2020-03-02 ASSESSMENT — PAIN DESCRIPTION - DESCRIPTORS
DESCRIPTORS: ACHING
DESCRIPTORS: ACHING;DISCOMFORT

## 2020-03-02 ASSESSMENT — PAIN DESCRIPTION - ORIENTATION
ORIENTATION: ANTERIOR
ORIENTATION: ANTERIOR
ORIENTATION: RIGHT;LEFT;MID

## 2020-03-02 ASSESSMENT — PAIN DESCRIPTION - LOCATION
LOCATION: GENERALIZED
LOCATION: ARM;BACK;LEG;NECK
LOCATION: GENERALIZED

## 2020-03-02 ASSESSMENT — PAIN SCALES - GENERAL
PAINLEVEL_OUTOF10: 7
PAINLEVEL_OUTOF10: 8
PAINLEVEL_OUTOF10: 10
PAINLEVEL_OUTOF10: 4

## 2020-03-02 ASSESSMENT — PAIN SCALES - WONG BAKER
WONGBAKER_NUMERICALRESPONSE: 0
WONGBAKER_NUMERICALRESPONSE: 0

## 2020-03-02 ASSESSMENT — PAIN DESCRIPTION - ONSET
ONSET: ON-GOING

## 2020-03-02 ASSESSMENT — PAIN DESCRIPTION - FREQUENCY
FREQUENCY: CONTINUOUS

## 2020-03-02 ASSESSMENT — PAIN - FUNCTIONAL ASSESSMENT
PAIN_FUNCTIONAL_ASSESSMENT: PREVENTS OR INTERFERES SOME ACTIVE ACTIVITIES AND ADLS
PAIN_FUNCTIONAL_ASSESSMENT: PREVENTS OR INTERFERES SOME ACTIVE ACTIVITIES AND ADLS
PAIN_FUNCTIONAL_ASSESSMENT: PREVENTS OR INTERFERES WITH MANY ACTIVE NOT PASSIVE ACTIVITIES

## 2020-03-02 ASSESSMENT — PAIN DESCRIPTION - PAIN TYPE
TYPE: CHRONIC PAIN

## 2020-03-02 NOTE — PROGRESS NOTES
Verified pharmacy with patient and faxed over script for hydralazine 25mg.   Also spoke to pharmacist

## 2020-03-02 NOTE — PROGRESS NOTES
sodium chloride flush 0.9 % injection 10 mL, 10 mL, Intravenous, PRN, Lacie Stephen, DO    polyethylene glycol (GLYCOLAX) packet 17 g, 17 g, Oral, Daily PRN, Lacie Stephen DO    ondansetron [facility]) injection 4 mg, 4 mg, Intravenous, Q6H PRN, Lacie Stephen, DO, 4 mg at 02/27/20 1145    insulin lispro (HUMALOG) injection vial 0-12 Units, 0-12 Units, Subcutaneous, TID WC, Lacie Stephen, DO, 2 Units at 03/01/20 0749    insulin lispro (HUMALOG) injection vial 0-6 Units, 0-6 Units, Subcutaneous, Nightly, Lacie Stephen, DO, 2 Units at 03/01/20 2242    glucose (GLUTOSE) 40 % oral gel 15 g, 15 g, Oral, PRN, Lacie Stephen, DO    dextrose 50 % IV solution, 12.5 g, Intravenous, PRN, Lacie Stephen, DO    glucagon (rDNA) injection 1 mg, 1 mg, Intramuscular, PRN, Lacie Stephen, DO    dextrose 5 % solution, 100 mL/hr, Intravenous, PRN, Lacie Stephen, DO    Objective:    /78   Pulse 77   Temp 98.1 °F (36.7 °C) (Oral)   Resp 16   Ht 5' 3.5\" (1.613 m)   Wt 240 lb 8 oz (109.1 kg)   SpO2 98%   BMI 41.93 kg/m²     Heart:  irreg  Lungs:  CTA bilaterally, no wheeze, rales or rhonchi  Abd: bowel sounds present, nontender, nondistended, no masses  Extrem:  Edema legs    CBC with Differential:    Lab Results   Component Value Date    WBC 12.5 02/25/2020    RBC 4.38 02/25/2020    HGB 11.8 02/25/2020    HCT 37.7 02/25/2020     02/25/2020    MCV 86.1 02/25/2020    MCH 26.9 02/25/2020    MCHC 31.3 02/25/2020    RDW 14.9 02/25/2020    LYMPHOPCT 7.9 02/25/2020    MONOPCT 7.4 02/25/2020    BASOPCT 0.0 02/25/2020    MONOSABS 0.92 02/25/2020    LYMPHSABS 0.98 02/25/2020    EOSABS 0.00 02/25/2020    BASOSABS 0.00 02/25/2020     CMP:    Lab Results   Component Value Date     03/02/2020    K 5.3 03/02/2020    K 5.1 02/07/2018     03/02/2020    CO2 21 03/02/2020    BUN 69 03/02/2020    CREATININE 1.7 03/02/2020    GFRAA 35 03/02/2020    LABGLOM 35 03/02/2020    GLUCOSE 138 03/02/2020 PROT 7.1 02/25/2020    LABALBU 4.0 02/25/2020    CALCIUM 8.4 03/02/2020    BILITOT 0.5 02/25/2020    ALKPHOS 70 02/25/2020    AST 9 02/25/2020    ALT 13 02/25/2020     Warfarin PT/INR:    Lab Results   Component Value Date    INR 1.7 02/25/2020    INR 1.2 11/20/2019    INR 1.1 10/06/2017    PROTIME 18.9 (H) 02/25/2020    PROTIME 13.2 (H) 11/20/2019    PROTIME 11.8 10/06/2017       Assessment:    Principal Problem:    DARVIN (acute kidney injury) ([facility] [address]  Active Problems:    Permanent atrial fibrillation    HTN (hypertension)    Hyperlipidemia    CAD (coronary artery disease)    Hypothyroidism    Gastroesophageal reflux disease    COPD (chronic obstructive pulmonary disease) ([facility])    Obstructive sleep apnea    Generalized OA    Chronic heart failure with preserved ejection fraction (HFpEF) ([facility])    Mitral regurgitation    Type 2 diabetes mellitus, with long-term current use of insulin ([facility])    Chronic anticoagulation    CKD (chronic kidney disease) stage 4, GFR 15-29 ml/min ([facility])    Obesity  Resolved Problems:    * No resolved hospital problems.  *      Plan:  Dc home close outpt f/u home health to follow        Radha Mass  3:54 PM  3/2/2020

## 2020-03-02 NOTE — HOME CARE
[facility] received referral. Will follow after discharge. Spoke with patient dtr Michelle Real) and verified demographics. Giovana Figueroa LPN, [facility].

## 2020-03-02 NOTE — PROGRESS NOTES
Occupational Therapy     Date:3/2/2020  Patient Name: Chris De Guzman  MRN: [id number]  : 1945  Room: [address] [place] [address]-A     Completed chart review & attempted to see pt, with pt already dressed, coat on & ready for d/c therefore declined this date. Will attempt to see pt at another time. Khris Lawn.  [address] [place] [address], [address] [place] [address]

## 2020-03-02 NOTE — PROGRESS NOTES
Progress Note  3/2/2020 11:11 AM  Subjective:   Admit Date: 2/25/2020  PCP: Julien Martin DO  Interval History:     3/1: Patient examined doing much better feels ok , still some weakness   3/2: pt seen in room. No cp or sob today    Diet: DIET CARB CONTROL;    Data:   Scheduled Meds:   insulin glargine  70 Units Subcutaneous Nightly    hydrALAZINE  25 mg Oral TID    apixaban  5 mg Oral BID    aspirin  81 mg Oral QAM    atorvastatin  40 mg Oral Daily    carvedilol  25 mg Oral BID WC    famotidine  10 mg Oral Daily    fluticasone  2 spray Nasal QAM    ipratropium-albuterol  1 vial Inhalation Q4H WA    levothyroxine  75 mcg Oral Daily    insulin lispro  15 Units Subcutaneous TID AC    sodium chloride flush  10 mL Intravenous 2 times per day    insulin lispro  0-12 Units Subcutaneous TID WC    insulin lispro  0-6 Units Subcutaneous Nightly     Continuous Infusions:   dextrose       PRN Meds:benzocaine-menthol, acetaminophen, albuterol sulfate HFA, sodium chloride flush, polyethylene glycol, ondansetron, glucose, dextrose, glucagon (rDNA), dextrose  I/O last 3 completed shifts: In: 1570 [P.O.:960; I.V.:610]  Out: 850 [Urine:850]  I/O this shift:  In: 380 [P.O.:380]  Out: -     Intake/Output Summary (Last 24 hours) at 3/2/2020 1111  Last data filed at 3/2/2020 0833  Gross per 24 hour   Intake 1470 ml   Output 400 ml   Net 1070 ml     CBC: No results for input(s): WBC, HGB, PLT in the last 72 hours. BMP:    Recent Labs     02/29/20  0426 03/01/20  0433 03/02/20  0434    139 147*   K 5.0 4.8 5.3*    108* 114*   CO2 18* 20* 21*   * 88* 69*   CREATININE 2.5* 1.9* 1.7*   GLUCOSE 117* 161* 138*     Hepatic: No results for input(s): AST, ALT, ALB, BILITOT, ALKPHOS in the last 72 hours. Troponin: No results for input(s): TROPONINI in the last 72 hours. BNP: No results for input(s): BNP in the last 72 hours. Lipids: No results for input(s): CHOL, HDL in the last 72 hours.     Invalid input(s): LDLCALCU  ABGs: No results found for: PHART, PO2ART, [id number]  INR: No results for input(s): INR in the last 72 hours. -----------------------------------------------------------------  RAD: Xr Chest Standard (2 Vw)    Result Date: 2020  Patient MRN: [id number] : 1945 Age:  76 years Gender: Female Order Date: 2020 12:15 PM Exam: XR CHEST (2 VW) Number of Images: 2 view Indication:   SOB SOB Comparison: 2019 FINDINGS: There is mild cardiomegaly which is stable. The pulmonary vascularity is normal. Lungs are clear. Neither costophrenic angle is blunted. Cardiomegaly. No new abnormal findings. Us Retroperitoneal Complete    Result Date: 2020  Patient Mrn: [id number] : 1945 Age:  76 years Gender: Female Order Date: 2020 5:15 PM Exam: US RETROPERITONEAL COMPLETE Number Of Images: 61 Views Indication:  Acute renal insufficiency Comparison: Previous renal ultrasound study 2017 TECHNIQUE: 2-D grayscale and color Doppler imaging were utilized for evaluation of the kidneys and bladder. Findings: The right kidney measures9.2 x 4.1 x 4.7 cm, and the left kidney measures 9.2 x 4.7 x 4.5 cm. There is no evidence of collecting system calcification, obstructive dilation, or perinephric inflammatory change. Cortical echogenicity is is uniform and normal bilaterally. A small exophytic left mid cortical cyst measures 7 mm diameter. . Central renal perfusion is intact bilaterally by color Doppler imaging. Images of the bladder show an estimated volume of 191. The post void residual was not measured. Bladder mural contours appear normal, and no intraluminal abnormalities are evident. No evidence of renal obstruction or inflammatory change. No upper urinary tract calcifications are evident. A small exophytic 7 mm diameter cyst is noted in the left mid kidney.  The bladder is mildly distended at 1 91 mL, and shows no evidence of intraluminal pathology form mural in context of decreased PO fluid intake and diuretic use  Urine studies suggestive of pre-renal etiology  Caution with excessive IVF in setting of CHF, continue IV NS 50cc/hr  Hold lisinopril  Monitor I's/O's, urine output  No acidosis or electrolyte abnormalities to warrant HD at this time  Azotemia noted      2.) HFrEF  Not in acute exacerbation  On GDMT  Clinically dry  CXR clear, not suggestive of volume overload  Continue GDMT per primary, hold lisinopril  Daily weights and strict I's/O's     3.) Chronic A fib  Rate presently controlled  Continue Eliquis      4.) IDDM2  Continue to monitor BG per primary  Cover hyperglycemia with sliding scale insulin  Carb control/Renal diet     5.) CAD  S/p CABG 1997, s/p PCI 2013  Continue ASA, Lipitor      6.) Non-anion gap acidosis  Secondary to CKD     7.) Hyperkalemia  Follow    8. hypernatremia  Holding rebecca Aponte

## 2020-03-02 NOTE — DISCHARGE INSTR - ACTIVITY
WHEN YOU NEED TRANSPORTATION TO DOCTOR APPOINTMENTS IN THE FUTURE: Call [facility] [phone]. They will arrange wheelchair transportation through your insurance at no cost to you. Have your insurance card available when you call. Call at least 7 days in advance to be sure there is a company available to transport you.

## 2020-03-03 ENCOUNTER — CARE COORDINATION (OUTPATIENT)
Dept: CASE MANAGEMENT | Age: 75
End: 2020-03-03

## 2020-03-03 LAB
EKG ATRIAL RATE: 97 BPM
EKG Q-T INTERVAL: 438 MS
EKG QRS DURATION: 84 MS
EKG QTC CALCULATION (BAZETT): 486 MS
EKG R AXIS: -5 DEGREES
EKG T AXIS: 128 DEGREES
EKG VENTRICULAR RATE: 74 BPM

## 2020-03-03 RX ORDER — CARVEDILOL 25 MG/1
25 TABLET ORAL 2 TIMES DAILY WITH MEALS
Qty: 60 TABLET | Refills: 5 | Status: SHIPPED | OUTPATIENT
Start: 2020-03-03

## 2020-03-03 NOTE — CARE COORDINATION
[facility] Transitions Initial Follow Up Call    Call within 2 business days of discharge: Yes    Patient: Eva Nelson Patient : 1945   MRN: [id number]  Reason for Admission: DARVIN  Discharge Date: 3/2/20 RARS: Readmission Risk Score: 26      Last Discharge [facility]       Complaint Diagnosis Description Type Department Provider    20 Hyperglycemia Elevated BUN . .. ED to Hosp-Admission (Discharged) (ADMITTED) [facility] 4[facility] PICU Vipul Hammond DO; Radha Cline. .. Spoke with: Patient    Facility: [address] [place] [address] with patient for first attempt BPCI at [phone] (H)(M). Introduced myself RN CTN from [facility] Chaim Day. myself to patient RN CTN calling patient to see how patient is feeling after being discharged from the hospital, review patient's allergies, medications patient is taking and see if there is anything patient needs. Patient requested RN CTN call back later today. Discussed with patient RN CTN will call back later this afternoon. Patient agreed. 3/3/20 Called patient back as agreed, patient declined to continue with the call.                Follow Up  Future Appointments   Date Time Provider Chichi Meade   3/4/2020 10:15 AM DO RINA Massey [facility]   2020 10:00 AM MD CHARLEEN Esparza ENDO [facility]   2020 10:45 AM Franci Devi MD [facility]RISTI ORTHO [facility]   2020  1:30 PM [facility] VAS US RM 1 [facility]   2020  2:00 PM Judith Gracia MD VASC/MED [facility]       David Meza RN

## 2020-03-04 ENCOUNTER — TELEPHONE (OUTPATIENT)
Dept: CARDIOLOGY CLINIC | Age: 75
End: 2020-03-04

## 2020-03-04 ENCOUNTER — HOSPITAL ENCOUNTER (INPATIENT)
Age: 75
LOS: 5 days | Discharge: NURSING, REHAB OR LONG-TERM CARE | DRG: 194 | End: 2020-03-09
Attending: EMERGENCY MEDICINE | Admitting: INTERNAL MEDICINE
Payer: MEDICARE

## 2020-03-04 ENCOUNTER — APPOINTMENT (OUTPATIENT)
Dept: CT IMAGING | Age: 75
DRG: 194 | End: 2020-03-04
Payer: MEDICARE

## 2020-03-04 ENCOUNTER — TELEPHONE (OUTPATIENT)
Dept: ADMINISTRATIVE | Age: 75
End: 2020-03-04

## 2020-03-04 ENCOUNTER — APPOINTMENT (OUTPATIENT)
Dept: GENERAL RADIOLOGY | Age: 75
DRG: 194 | End: 2020-03-04
Payer: MEDICARE

## 2020-03-04 ENCOUNTER — TELEPHONE (OUTPATIENT)
Dept: OTHER | Facility: CLINIC | Age: 75
End: 2020-03-04

## 2020-03-04 ENCOUNTER — CARE COORDINATION (OUTPATIENT)
Dept: CASE MANAGEMENT | Age: 75
End: 2020-03-04

## 2020-03-04 PROBLEM — J18.9 PNA (PNEUMONIA): Status: ACTIVE | Noted: 2020-03-04

## 2020-03-04 LAB
ALBUMIN SERPL-MCNC: 3.5 G/DL (ref 3.5–5.2)
ALP BLD-CCNC: 101 U/L (ref 35–104)
ALT SERPL-CCNC: 74 U/L (ref 0–32)
AMORPHOUS: ABNORMAL
ANION GAP SERPL CALCULATED.3IONS-SCNC: 13 MMOL/L (ref 7–16)
ANISOCYTOSIS: ABNORMAL
AST SERPL-CCNC: 76 U/L (ref 0–31)
BACTERIA: ABNORMAL /HPF
BASOPHILS ABSOLUTE: 0.02 E9/L (ref 0–0.2)
BASOPHILS RELATIVE PERCENT: 0.2 % (ref 0–2)
BILIRUB SERPL-MCNC: 0.6 MG/DL (ref 0–1.2)
BILIRUBIN URINE: NEGATIVE
BLOOD, URINE: NEGATIVE
BUN BLDV-MCNC: 69 MG/DL (ref 8–23)
BURR CELLS: ABNORMAL
CALCIUM SERPL-MCNC: 8.6 MG/DL (ref 8.6–10.2)
CHLORIDE BLD-SCNC: 103 MMOL/L (ref 98–107)
CLARITY: CLEAR
CO2: 19 MMOL/L (ref 22–29)
COLOR: YELLOW
CREAT SERPL-MCNC: 2.3 MG/DL (ref 0.5–1)
EOSINOPHILS ABSOLUTE: 0 E9/L (ref 0.05–0.5)
EOSINOPHILS RELATIVE PERCENT: 0 % (ref 0–6)
EPITHELIAL CELLS, UA: ABNORMAL /HPF
GFR AFRICAN AMERICAN: 25
GFR NON-AFRICAN AMERICAN: 25 ML/MIN/1.73
GLUCOSE BLD-MCNC: 310 MG/DL (ref 74–99)
GLUCOSE URINE: 500 MG/DL
HCT VFR BLD CALC: 29.9 % (ref 34–48)
HEMOGLOBIN: 9 G/DL (ref 11.5–15.5)
IMMATURE GRANULOCYTES #: 0.05 E9/L
IMMATURE GRANULOCYTES %: 0.6 % (ref 0–5)
KETONES, URINE: NEGATIVE MG/DL
LACTIC ACID, SEPSIS: 1 MMOL/L (ref 0.5–1.9)
LACTIC ACID, SEPSIS: 2.2 MMOL/L (ref 0.5–1.9)
LEUKOCYTE ESTERASE, URINE: ABNORMAL
LIPASE: 17 U/L (ref 13–60)
LYMPHOCYTES ABSOLUTE: 0.58 E9/L (ref 1.5–4)
LYMPHOCYTES RELATIVE PERCENT: 7.1 % (ref 20–42)
MCH RBC QN AUTO: 26.6 PG (ref 26–35)
MCHC RBC AUTO-ENTMCNC: 30.1 % (ref 32–34.5)
MCV RBC AUTO: 88.5 FL (ref 80–99.9)
METER GLUCOSE: 221 MG/DL (ref 74–99)
MONOCYTES ABSOLUTE: 0.58 E9/L (ref 0.1–0.95)
MONOCYTES RELATIVE PERCENT: 7.1 % (ref 2–12)
NEUTROPHILS ABSOLUTE: 6.93 E9/L (ref 1.8–7.3)
NEUTROPHILS RELATIVE PERCENT: 85 % (ref 43–80)
NITRITE, URINE: NEGATIVE
OVALOCYTES: ABNORMAL
PDW BLD-RTO: 15.7 FL (ref 11.5–15)
PH UA: 5 (ref 5–9)
PLATELET # BLD: 133 E9/L (ref 130–450)
PMV BLD AUTO: 12.2 FL (ref 7–12)
POIKILOCYTES: ABNORMAL
POLYCHROMASIA: ABNORMAL
POTASSIUM REFLEX MAGNESIUM: 5.7 MMOL/L (ref 3.5–5)
PRO-BNP: ABNORMAL PG/ML (ref 0–450)
PROCALCITONIN: 1.3 NG/ML (ref 0–0.08)
PROTEIN UA: NEGATIVE MG/DL
RBC # BLD: 3.38 E12/L (ref 3.5–5.5)
RBC UA: ABNORMAL /HPF (ref 0–2)
SCHISTOCYTES: ABNORMAL
SODIUM BLD-SCNC: 135 MMOL/L (ref 132–146)
SPECIFIC GRAVITY UA: 1.02 (ref 1–1.03)
TOTAL CK: 1201 U/L (ref 20–180)
TOTAL PROTEIN: 6.1 G/DL (ref 6.4–8.3)
TROPONIN: 0.08 NG/ML (ref 0–0.03)
UROBILINOGEN, URINE: 0.2 E.U./DL
WBC # BLD: 8.2 E9/L (ref 4.5–11.5)
WBC UA: ABNORMAL /HPF (ref 0–5)

## 2020-03-04 PROCEDURE — 2580000003 HC RX 258: Performed by: STUDENT IN AN ORGANIZED HEALTH CARE EDUCATION/TRAINING PROGRAM

## 2020-03-04 PROCEDURE — 85025 COMPLETE CBC W/AUTO DIFF WBC: CPT

## 2020-03-04 PROCEDURE — 94640 AIRWAY INHALATION TREATMENT: CPT

## 2020-03-04 PROCEDURE — 73502 X-RAY EXAM HIP UNI 2-3 VIEWS: CPT

## 2020-03-04 PROCEDURE — 84484 ASSAY OF TROPONIN QUANT: CPT

## 2020-03-04 PROCEDURE — 87040 BLOOD CULTURE FOR BACTERIA: CPT

## 2020-03-04 PROCEDURE — 73590 X-RAY EXAM OF LOWER LEG: CPT

## 2020-03-04 PROCEDURE — 6370000000 HC RX 637 (ALT 250 FOR IP): Performed by: STUDENT IN AN ORGANIZED HEALTH CARE EDUCATION/TRAINING PROGRAM

## 2020-03-04 PROCEDURE — 82550 ASSAY OF CK (CPK): CPT

## 2020-03-04 PROCEDURE — 6360000002 HC RX W HCPCS: Performed by: STUDENT IN AN ORGANIZED HEALTH CARE EDUCATION/TRAINING PROGRAM

## 2020-03-04 PROCEDURE — 2580000003 HC RX 258: Performed by: INTERNAL MEDICINE

## 2020-03-04 PROCEDURE — 2060000000 HC ICU INTERMEDIATE R&B

## 2020-03-04 PROCEDURE — 96365 THER/PROPH/DIAG IV INF INIT: CPT

## 2020-03-04 PROCEDURE — 83690 ASSAY OF LIPASE: CPT

## 2020-03-04 PROCEDURE — 96368 THER/DIAG CONCURRENT INF: CPT

## 2020-03-04 PROCEDURE — 80053 COMPREHEN METABOLIC PANEL: CPT

## 2020-03-04 PROCEDURE — 6370000000 HC RX 637 (ALT 250 FOR IP): Performed by: INTERNAL MEDICINE

## 2020-03-04 PROCEDURE — 83880 ASSAY OF NATRIURETIC PEPTIDE: CPT

## 2020-03-04 PROCEDURE — 36415 COLL VENOUS BLD VENIPUNCTURE: CPT

## 2020-03-04 PROCEDURE — 73552 X-RAY EXAM OF FEMUR 2/>: CPT

## 2020-03-04 PROCEDURE — 71045 X-RAY EXAM CHEST 1 VIEW: CPT

## 2020-03-04 PROCEDURE — 99285 EMERGENCY DEPT VISIT HI MDM: CPT

## 2020-03-04 PROCEDURE — 94760 N-INVAS EAR/PLS OXIMETRY 1: CPT

## 2020-03-04 PROCEDURE — 83605 ASSAY OF LACTIC ACID: CPT

## 2020-03-04 PROCEDURE — 2500000003 HC RX 250 WO HCPCS: Performed by: STUDENT IN AN ORGANIZED HEALTH CARE EDUCATION/TRAINING PROGRAM

## 2020-03-04 PROCEDURE — 84145 PROCALCITONIN (PCT): CPT

## 2020-03-04 PROCEDURE — 87088 URINE BACTERIA CULTURE: CPT

## 2020-03-04 PROCEDURE — 74176 CT ABD & PELVIS W/O CONTRAST: CPT

## 2020-03-04 PROCEDURE — 70450 CT HEAD/BRAIN W/O DYE: CPT

## 2020-03-04 PROCEDURE — 81001 URINALYSIS AUTO W/SCOPE: CPT

## 2020-03-04 PROCEDURE — 2700000000 HC OXYGEN THERAPY PER DAY

## 2020-03-04 PROCEDURE — 82962 GLUCOSE BLOOD TEST: CPT

## 2020-03-04 PROCEDURE — 72125 CT NECK SPINE W/O DYE: CPT

## 2020-03-04 PROCEDURE — 93005 ELECTROCARDIOGRAM TRACING: CPT | Performed by: STUDENT IN AN ORGANIZED HEALTH CARE EDUCATION/TRAINING PROGRAM

## 2020-03-04 RX ORDER — SODIUM CHLORIDE 0.9 % (FLUSH) 0.9 %
10 SYRINGE (ML) INJECTION EVERY 12 HOURS SCHEDULED
Status: DISCONTINUED | OUTPATIENT
Start: 2020-03-04 | End: 2020-03-09 | Stop reason: HOSPADM

## 2020-03-04 RX ORDER — ATORVASTATIN CALCIUM 40 MG/1
40 TABLET, FILM COATED ORAL DAILY
Status: DISCONTINUED | OUTPATIENT
Start: 2020-03-05 | End: 2020-03-09 | Stop reason: HOSPADM

## 2020-03-04 RX ORDER — POLYETHYLENE GLYCOL 3350 17 G/17G
17 POWDER, FOR SOLUTION ORAL DAILY PRN
Status: DISCONTINUED | OUTPATIENT
Start: 2020-03-04 | End: 2020-03-09 | Stop reason: HOSPADM

## 2020-03-04 RX ORDER — ONDANSETRON 2 MG/ML
4 INJECTION INTRAMUSCULAR; INTRAVENOUS EVERY 6 HOURS PRN
Status: DISCONTINUED | OUTPATIENT
Start: 2020-03-04 | End: 2020-03-09 | Stop reason: HOSPADM

## 2020-03-04 RX ORDER — INSULIN GLARGINE 100 [IU]/ML
70 INJECTION, SOLUTION SUBCUTANEOUS NIGHTLY
Status: DISCONTINUED | OUTPATIENT
Start: 2020-03-04 | End: 2020-03-06

## 2020-03-04 RX ORDER — 0.9 % SODIUM CHLORIDE 0.9 %
1000 INTRAVENOUS SOLUTION INTRAVENOUS ONCE
Status: COMPLETED | OUTPATIENT
Start: 2020-03-04 | End: 2020-03-04

## 2020-03-04 RX ORDER — ACETAMINOPHEN 325 MG/1
650 TABLET ORAL EVERY 6 HOURS PRN
Status: DISCONTINUED | OUTPATIENT
Start: 2020-03-04 | End: 2020-03-09 | Stop reason: HOSPADM

## 2020-03-04 RX ORDER — IPRATROPIUM BROMIDE AND ALBUTEROL SULFATE 2.5; .5 MG/3ML; MG/3ML
3 SOLUTION RESPIRATORY (INHALATION) ONCE
Status: COMPLETED | OUTPATIENT
Start: 2020-03-04 | End: 2020-03-04

## 2020-03-04 RX ORDER — IPRATROPIUM BROMIDE AND ALBUTEROL SULFATE 2.5; .5 MG/3ML; MG/3ML
1 SOLUTION RESPIRATORY (INHALATION)
Status: DISCONTINUED | OUTPATIENT
Start: 2020-03-05 | End: 2020-03-09 | Stop reason: HOSPADM

## 2020-03-04 RX ORDER — ASPIRIN 81 MG/1
81 TABLET ORAL EVERY MORNING
Status: DISCONTINUED | OUTPATIENT
Start: 2020-03-05 | End: 2020-03-09 | Stop reason: HOSPADM

## 2020-03-04 RX ORDER — HYDRALAZINE HYDROCHLORIDE 25 MG/1
25 TABLET, FILM COATED ORAL 3 TIMES DAILY
Status: DISCONTINUED | OUTPATIENT
Start: 2020-03-04 | End: 2020-03-09 | Stop reason: HOSPADM

## 2020-03-04 RX ORDER — SODIUM CHLORIDE 0.9 % (FLUSH) 0.9 %
10 SYRINGE (ML) INJECTION PRN
Status: DISCONTINUED | OUTPATIENT
Start: 2020-03-04 | End: 2020-03-09 | Stop reason: HOSPADM

## 2020-03-04 RX ORDER — FAMOTIDINE 20 MG/1
20 TABLET, FILM COATED ORAL DAILY
Status: DISCONTINUED | OUTPATIENT
Start: 2020-03-05 | End: 2020-03-09 | Stop reason: HOSPADM

## 2020-03-04 RX ORDER — PROMETHAZINE HYDROCHLORIDE 25 MG/1
12.5 TABLET ORAL EVERY 6 HOURS PRN
Status: DISCONTINUED | OUTPATIENT
Start: 2020-03-04 | End: 2020-03-09 | Stop reason: HOSPADM

## 2020-03-04 RX ORDER — LEVOTHYROXINE SODIUM 0.07 MG/1
75 TABLET ORAL DAILY
Status: DISCONTINUED | OUTPATIENT
Start: 2020-03-05 | End: 2020-03-09 | Stop reason: HOSPADM

## 2020-03-04 RX ORDER — SODIUM CHLORIDE 9 MG/ML
INJECTION, SOLUTION INTRAVENOUS ONCE
Status: DISCONTINUED | OUTPATIENT
Start: 2020-03-04 | End: 2020-03-04

## 2020-03-04 RX ORDER — ACETAMINOPHEN 650 MG/1
650 SUPPOSITORY RECTAL EVERY 6 HOURS PRN
Status: DISCONTINUED | OUTPATIENT
Start: 2020-03-04 | End: 2020-03-09 | Stop reason: HOSPADM

## 2020-03-04 RX ORDER — CARVEDILOL 25 MG/1
25 TABLET ORAL 2 TIMES DAILY WITH MEALS
Status: DISCONTINUED | OUTPATIENT
Start: 2020-03-05 | End: 2020-03-09 | Stop reason: HOSPADM

## 2020-03-04 RX ORDER — FLUTICASONE PROPIONATE 50 MCG
2 SPRAY, SUSPENSION (ML) NASAL EVERY MORNING
Status: DISCONTINUED | OUTPATIENT
Start: 2020-03-05 | End: 2020-03-09 | Stop reason: HOSPADM

## 2020-03-04 RX ADMIN — APIXABAN 5 MG: 5 TABLET, FILM COATED ORAL at 23:49

## 2020-03-04 RX ADMIN — HYDRALAZINE HYDROCHLORIDE 25 MG: 25 TABLET, FILM COATED ORAL at 23:48

## 2020-03-04 RX ADMIN — SODIUM CHLORIDE, PRESERVATIVE FREE 10 ML: 5 INJECTION INTRAVENOUS at 23:51

## 2020-03-04 RX ADMIN — DOXYCYCLINE 100 MG: 100 INJECTION, POWDER, LYOPHILIZED, FOR SOLUTION INTRAVENOUS at 20:18

## 2020-03-04 RX ADMIN — SODIUM CHLORIDE 1000 ML: 9 INJECTION, SOLUTION INTRAVENOUS at 19:15

## 2020-03-04 RX ADMIN — IPRATROPIUM BROMIDE AND ALBUTEROL SULFATE 3 AMPULE: 2.5; .5 SOLUTION RESPIRATORY (INHALATION) at 16:43

## 2020-03-04 RX ADMIN — CEFEPIME HYDROCHLORIDE 2 G: 2 INJECTION, POWDER, FOR SOLUTION INTRAVENOUS at 19:15

## 2020-03-04 RX ADMIN — INSULIN GLARGINE 70 UNITS: 100 INJECTION, SOLUTION SUBCUTANEOUS at 23:49

## 2020-03-04 ASSESSMENT — PAIN SCALES - GENERAL: PAINLEVEL_OUTOF10: 8

## 2020-03-04 ASSESSMENT — ENCOUNTER SYMPTOMS
ABDOMINAL PAIN: 1
SORE THROAT: 0
DIARRHEA: 0
NAUSEA: 0
WHEEZING: 1
VOMITING: 0
COUGH: 1
SHORTNESS OF BREATH: 1

## 2020-03-04 NOTE — CARE COORDINATION
[facility] call:    [facility] as RN CTN noted per EMR daughter called and cancelled the transitional care visit today, found patient lying on the floor and was disoriented, thought she was still in the hospital.  She did call for assistance in getting her up and the physical therapist is with her. RN CTN noted PCP instructions were go to ER is unstable and follow up in 1 week. Office had left a message for daughter to call office back. RN CTN has called patient and patient gave permission for RN CTN speak with daughter, Naman Brown. FABIOLA JUDDN attempted to reach patient's daughter no answer left voice message. Spoke with Brittni Jay requested to speak with nurse. Spoke with Leatha Oh explained the above and requested if they could send a nurse today. She states she will try to accommodate this if unable will move up her next visit to tomorrow. Leatha Oh states Nancy Michael is the physical therapist scheduled to visit with patient today. Requested contact information for Jamila. Contact number is [phone]. Spoke with Nancy Michael discussed the above with Nancy RUBIO is concerned regarding patient. Nancy Michael  states she has not been out to the house to visit the patient today, is on her way now. Discussed with Jamila please update FABIOLA RUBIO she has contact information. Nancy Michael states will call back with update. She is aware if patient is unstable PCP's instruction is for patient to go to the ED.

## 2020-03-04 NOTE — ED NOTES
Bed: 18A-18  Expected date:   Expected time:   Means of arrival:   Comments:  ems     Elizabeth Blackwell RN  03/04/20 7848

## 2020-03-04 NOTE — TELEPHONE ENCOUNTER
Called pt. To schedule a hospital f/u but her daughter Bernita Lanes) said that the pt. Went back to the ER this afternoon, she said the patient was incoherent and disoriented so she called the EMTs to transport her to the ER.

## 2020-03-04 NOTE — ED PROVIDER NOTES
Contrast? None [WL]   1720 Creatinine(!): 2.3 [WL]   1801 1. No acute intracranial abnormality. 2. Prominent mucosal thickening in the right sphenoid sinus. CT Head WO Contrast [WL]   1801 Stable asymmetric enlargement of the left thyroid lobe, likely  goitrous in etiology and degenerative disk changes. CT Cervical Spine WO Contrast [WL]   5147 Consult is made to orthopedic surgery for concerns of left hip fracture. [WL]   1837 Brown stool present with guaiac negative    [WL]   2036 Spoke with Dr. Redd López, he states to stop the fluids and he agrees to admit the patient. He states patient has severe CHF and would not want us to fluid overload her. She was treated with broad-spectrum antibiotics for her pneumonia. The orthopedic resident came to evaluate the patient and states there is no fracture.     [WL]      ED Course User Index  [WL] Ramonita Bob, DO              --------------------------------------------- PAST HISTORY ---------------------------------------------  Past Medical History:  has a past medical history of Adenoma of left adrenal gland, Adenoma of left adrenal gland, DARVIN (acute kidney injury) ([address] Albuminuria, Anterior myocardial infarction ([address] Anxiety, Asthma, Atrial fibrillation ([address] CAD (coronary artery disease), CKD (chronic kidney disease) stage 3, GFR 30-59 ml/min ([facility]), Congestive heart failure, NYHA class 3 and ACC/AHA stage C ([address] COPD (chronic obstructive pulmonary disease) ([address] Dementia ([address] Diabetes mellitus ([address] Diverticular disease, Diverticulitis, Dry eye, Elevated plasma metanephrines, Esophagitis, [place] grade A, Fecal incontinence, Fibromyalgia, Gastritis, Gastroesophageal reflux disease without esophagitis, Generalized OA, GI bleed, Hiatal hernia, History of methicillin resistant Staphylococcus aureus infection, Hyperlipidemia, Hyperplastic colon polyp, Hypertension, Hypokalemia, Hypothyroidism, Iron deficiency anemia, Left atrial dilation, Lumbar herniated disc, LVH (left ventricular hypertrophy), Microalbuminuria, STACEY (obstructive sleep apnea), Osteoporosis, Peripheral neuropathy, Post laminectomy syndrome, Primary osteoarthritis involving multiple joints, Primary osteoarthritis of both knees, Proteinuria, Pulmonary hypertension ([facility] Syncope, Tobacco abuse, Valvular heart disease, and Vitamin D deficiency. Past Surgical History:  has a past surgical history that includes Carpal tunnel release (Bilateral); Coronary angioplasty with stent (2013); Coronary artery bypass graft ();  section; Appendectomy; Lumbar spine surgery (); Cardiac catheterization (2012); doppler echocardiography (2016); celeste and bso (cervix removed); Colonoscopy (2014, 2008, 2003); Upper gastrointestinal endoscopy (5/20/15, 2013, 13); cardiovascular stress test (, , ); back surgery (2003); and Upper gastrointestinal endoscopy (2017). Social History:  reports that she quit smoking about 4 years ago. Her smoking use included cigarettes. She started smoking about 58 years ago. She has a 27.50 pack-year smoking history. She has never used smokeless tobacco. She reports previous alcohol use. She reports that she does not use drugs. Family History: family history includes Breast Cancer in her mother; Diabetes in her maternal grandmother, mother, and sister; Stroke in her maternal grandmother. The patients home medications have been reviewed. Allergies: Iv [iodides]; Codeine; Cymbalta [duloxetine hcl]; Gabapentin; Hydrocodone; Morphine; Pradaxa [dabigatran etexilate mesylate];  Oxycontin [oxycodone hcl]; and Penicillins    -------------------------------------------------- RESULTS -------------------------------------------------    LABS:  Results for orders placed or performed during the hospital encounter of 20   Culture, Urine   Result Value Ref Range    Urine Culture, Routine       ,000 (!) 154/92 96.1 °F (35.6 °C) Temporal 115 16 98 % -- 240 lb (108.9 kg)         Counseling:  I have spoken with the patient/family members and discussed todays results, in addition to providing specific details for the plan of care and counseling regarding the diagnosis and prognosis. Their questions are answered at this time and they are agreeable with the plan of admission. This patient has remained hemodynamically stable during their ED course. Diagnosis:  1. Pneumonia due to organism    2. Non-traumatic rhabdomyolysis    3. Fall from bed, initial encounter    4. Confusion    5. Goiter    6. Left hip pain        Disposition:  Patient's disposition: Admit  Patient's condition is stable. NOTE:  This report was transcribed using voice recognition software. Efforts were made to ensure accuracy; however, inadvertent computerized transcription errors may be present.          Joey Stoddard DO  Resident  03/05/20 3518

## 2020-03-04 NOTE — CARE COORDINATION
[facility] Transitions Initial Follow Up Call    Call within 2 business days of discharge: Yes    Patient: Stoney Faith Patient : 1945   MRN: [id number]  Reason for Admission: DARVIN  Discharge Date: 3/2/20 RARS: Readmission Risk Score: 26      Last Discharge [facility]       Complaint Diagnosis Description Type Department Provider    20 Hyperglycemia Elevated BUN . .. ED to Hosp-Admission (Discharged) (ADMITTED) [facility] 4[facility] PICU Terrell Glad, DO; Mortimer Forts. .. Called for patient at  [phone] (H) second attempt for initial BPCI care transition call post hospital discharge. Explained the role of Care Transition Nurse and the BPCI-A program.  Discussed with patient calling to see how patient is feeling, review allergies, medications patient is taking and see if there is anything patient needs. Patient stated, \"I could try\", RN CTN asked patient if there was someone else she would like RN JOANNE to speak with. Patient states she would like RN CTN to speak with her daughter, Juan Tavarez. 3/4/20 1:14 pm RN CTN called for patient's daughter, Juan Tavarez at [phone] (H) identified myself, RN CTN with [facility], requested she please return the call today. Provided contact information.      Spoke with: PatientDilan: [facility] SURGICAL [facility]      Follow Up  Future Appointments   Date Time Provider Chichi Meade   2020 10:00  Basil Warren MD [facility]   2020 10:45 AM Kaykay Lara MD Critical access hospital ORTHO [facility]   2020  1:30 PM HP VAS [facility]   2020  2:00 PM Ruthann Teixeira MD VAS/MED [facility]       Ramirez Ahuja RN

## 2020-03-05 PROBLEM — N19 RENAL FAILURE: Status: RESOLVED | Noted: 2020-02-25 | Resolved: 2020-03-05

## 2020-03-05 PROBLEM — I50.32 CHRONIC DIASTOLIC CONGESTIVE HEART FAILURE, NYHA CLASS 3 (HCC): Chronic | Status: ACTIVE | Noted: 2019-09-25

## 2020-03-05 PROBLEM — J10.1 INFLUENZA A: Status: ACTIVE | Noted: 2020-03-05

## 2020-03-05 LAB
ADENOVIRUS BY PCR: NOT DETECTED
ANION GAP SERPL CALCULATED.3IONS-SCNC: 13 MMOL/L (ref 7–16)
BORDETELLA PARAPERTUSSIS BY PCR: NOT DETECTED
BORDETELLA PERTUSSIS BY PCR: NOT DETECTED
BUN BLDV-MCNC: 69 MG/DL (ref 8–23)
CALCIUM SERPL-MCNC: 8.6 MG/DL (ref 8.6–10.2)
CHLAMYDOPHILIA PNEUMONIAE BY PCR: NOT DETECTED
CHLORIDE BLD-SCNC: 102 MMOL/L (ref 98–107)
CO2: 21 MMOL/L (ref 22–29)
CORONAVIRUS 229E BY PCR: NOT DETECTED
CORONAVIRUS HKU1 BY PCR: NOT DETECTED
CORONAVIRUS NL63 BY PCR: NOT DETECTED
CORONAVIRUS OC43 BY PCR: NOT DETECTED
CREAT SERPL-MCNC: 2.2 MG/DL (ref 0.5–1)
EKG ATRIAL RATE: 115 BPM
EKG Q-T INTERVAL: 362 MS
EKG QRS DURATION: 82 MS
EKG QTC CALCULATION (BAZETT): 478 MS
EKG R AXIS: 17 DEGREES
EKG T AXIS: 158 DEGREES
EKG VENTRICULAR RATE: 105 BPM
GFR AFRICAN AMERICAN: 26
GFR NON-AFRICAN AMERICAN: 26 ML/MIN/1.73
GLUCOSE BLD-MCNC: 221 MG/DL (ref 74–99)
HCT VFR BLD CALC: 28.9 % (ref 34–48)
HEMOGLOBIN: 8.7 G/DL (ref 11.5–15.5)
HUMAN METAPNEUMOVIRUS BY PCR: NOT DETECTED
HUMAN RHINOVIRUS/ENTEROVIRUS BY PCR: NOT DETECTED
INFLUENZA A H1-2009 BY PCR: DETECTED
INFLUENZA B BY PCR: NOT DETECTED
MCH RBC QN AUTO: 26.9 PG (ref 26–35)
MCHC RBC AUTO-ENTMCNC: 30.1 % (ref 32–34.5)
MCV RBC AUTO: 89.2 FL (ref 80–99.9)
METER GLUCOSE: 143 MG/DL (ref 74–99)
METER GLUCOSE: 146 MG/DL (ref 74–99)
METER GLUCOSE: 235 MG/DL (ref 74–99)
METER GLUCOSE: 93 MG/DL (ref 74–99)
MYCOPLASMA PNEUMONIAE BY PCR: NOT DETECTED
PARAINFLUENZA VIRUS 1 BY PCR: NOT DETECTED
PARAINFLUENZA VIRUS 2 BY PCR: NOT DETECTED
PARAINFLUENZA VIRUS 3 BY PCR: NOT DETECTED
PARAINFLUENZA VIRUS 4 BY PCR: NOT DETECTED
PDW BLD-RTO: 15.8 FL (ref 11.5–15)
PLATELET # BLD: 133 E9/L (ref 130–450)
PMV BLD AUTO: 12.2 FL (ref 7–12)
POTASSIUM SERPL-SCNC: 5.5 MMOL/L (ref 3.5–5)
RBC # BLD: 3.24 E12/L (ref 3.5–5.5)
RESPIRATORY SYNCYTIAL VIRUS BY PCR: NOT DETECTED
SODIUM BLD-SCNC: 136 MMOL/L (ref 132–146)
TOTAL CK: 940 U/L (ref 20–180)
URINE CULTURE, ROUTINE: NORMAL
WBC # BLD: 9.3 E9/L (ref 4.5–11.5)

## 2020-03-05 PROCEDURE — 0100U HC RESPIRPTHGN MULT REV TRANS & AMP PRB TECH 21 TRGT: CPT

## 2020-03-05 PROCEDURE — 87077 CULTURE AEROBIC IDENTIFY: CPT

## 2020-03-05 PROCEDURE — 82962 GLUCOSE BLOOD TEST: CPT

## 2020-03-05 PROCEDURE — 2700000000 HC OXYGEN THERAPY PER DAY

## 2020-03-05 PROCEDURE — 36415 COLL VENOUS BLD VENIPUNCTURE: CPT

## 2020-03-05 PROCEDURE — 97165 OT EVAL LOW COMPLEX 30 MIN: CPT

## 2020-03-05 PROCEDURE — 87186 SC STD MICRODIL/AGAR DIL: CPT

## 2020-03-05 PROCEDURE — 85027 COMPLETE CBC AUTOMATED: CPT

## 2020-03-05 PROCEDURE — 94640 AIRWAY INHALATION TREATMENT: CPT

## 2020-03-05 PROCEDURE — 6370000000 HC RX 637 (ALT 250 FOR IP): Performed by: INTERNAL MEDICINE

## 2020-03-05 PROCEDURE — 99222 1ST HOSP IP/OBS MODERATE 55: CPT | Performed by: INTERNAL MEDICINE

## 2020-03-05 PROCEDURE — 87206 SMEAR FLUORESCENT/ACID STAI: CPT

## 2020-03-05 PROCEDURE — 2580000003 HC RX 258: Performed by: STUDENT IN AN ORGANIZED HEALTH CARE EDUCATION/TRAINING PROGRAM

## 2020-03-05 PROCEDURE — 97530 THERAPEUTIC ACTIVITIES: CPT

## 2020-03-05 PROCEDURE — 2580000003 HC RX 258: Performed by: INTERNAL MEDICINE

## 2020-03-05 PROCEDURE — 93010 ELECTROCARDIOGRAM REPORT: CPT | Performed by: INTERNAL MEDICINE

## 2020-03-05 PROCEDURE — 97535 SELF CARE MNGMENT TRAINING: CPT

## 2020-03-05 PROCEDURE — 97162 PT EVAL MOD COMPLEX 30 MIN: CPT

## 2020-03-05 PROCEDURE — 6360000002 HC RX W HCPCS: Performed by: STUDENT IN AN ORGANIZED HEALTH CARE EDUCATION/TRAINING PROGRAM

## 2020-03-05 PROCEDURE — 87070 CULTURE OTHR SPECIMN AEROBIC: CPT

## 2020-03-05 PROCEDURE — 80048 BASIC METABOLIC PNL TOTAL CA: CPT

## 2020-03-05 PROCEDURE — 6360000002 HC RX W HCPCS: Performed by: INTERNAL MEDICINE

## 2020-03-05 PROCEDURE — 2060000000 HC ICU INTERMEDIATE R&B

## 2020-03-05 PROCEDURE — 2500000003 HC RX 250 WO HCPCS: Performed by: INTERNAL MEDICINE

## 2020-03-05 PROCEDURE — 82550 ASSAY OF CK (CPK): CPT

## 2020-03-05 RX ORDER — DOXYCYCLINE HYCLATE 100 MG/1
100 CAPSULE ORAL EVERY 12 HOURS SCHEDULED
Status: DISCONTINUED | OUTPATIENT
Start: 2020-03-05 | End: 2020-03-05

## 2020-03-05 RX ORDER — SODIUM CHLORIDE 9 MG/ML
INJECTION, SOLUTION INTRAVENOUS CONTINUOUS
Status: DISCONTINUED | OUTPATIENT
Start: 2020-03-05 | End: 2020-03-07

## 2020-03-05 RX ORDER — BUMETANIDE 0.25 MG/ML
1 INJECTION, SOLUTION INTRAMUSCULAR; INTRAVENOUS 2 TIMES DAILY
Status: DISCONTINUED | OUTPATIENT
Start: 2020-03-05 | End: 2020-03-05

## 2020-03-05 RX ORDER — OSELTAMIVIR PHOSPHATE 75 MG/1
75 CAPSULE ORAL DAILY
Status: DISCONTINUED | OUTPATIENT
Start: 2020-03-06 | End: 2020-03-06

## 2020-03-05 RX ORDER — DEXTROSE MONOHYDRATE 50 MG/ML
100 INJECTION, SOLUTION INTRAVENOUS PRN
Status: DISCONTINUED | OUTPATIENT
Start: 2020-03-05 | End: 2020-03-09 | Stop reason: HOSPADM

## 2020-03-05 RX ORDER — POTASSIUM CHLORIDE AND SODIUM CHLORIDE 900; 300 MG/100ML; MG/100ML
INJECTION, SOLUTION INTRAVENOUS CONTINUOUS
Status: DISCONTINUED | OUTPATIENT
Start: 2020-03-05 | End: 2020-03-05

## 2020-03-05 RX ORDER — OSELTAMIVIR PHOSPHATE 30 MG/1
30 CAPSULE ORAL DAILY
Status: DISCONTINUED | OUTPATIENT
Start: 2020-03-05 | End: 2020-03-05

## 2020-03-05 RX ORDER — NICOTINE POLACRILEX 4 MG
15 LOZENGE BUCCAL PRN
Status: DISCONTINUED | OUTPATIENT
Start: 2020-03-05 | End: 2020-03-09 | Stop reason: HOSPADM

## 2020-03-05 RX ORDER — DEXTROSE MONOHYDRATE 25 G/50ML
12.5 INJECTION, SOLUTION INTRAVENOUS PRN
Status: DISCONTINUED | OUTPATIENT
Start: 2020-03-05 | End: 2020-03-09 | Stop reason: HOSPADM

## 2020-03-05 RX ADMIN — FAMOTIDINE 20 MG: 20 TABLET, FILM COATED ORAL at 09:40

## 2020-03-05 RX ADMIN — BUMETANIDE 1 MG: 0.25 INJECTION INTRAMUSCULAR; INTRAVENOUS at 07:34

## 2020-03-05 RX ADMIN — HYDRALAZINE HYDROCHLORIDE 25 MG: 25 TABLET, FILM COATED ORAL at 09:40

## 2020-03-05 RX ADMIN — IPRATROPIUM BROMIDE AND ALBUTEROL SULFATE 3 ML: 2.5; .5 SOLUTION RESPIRATORY (INHALATION) at 12:29

## 2020-03-05 RX ADMIN — BENZOCAINE AND MENTHOL 1 LOZENGE: 15; 3.6 LOZENGE ORAL at 14:55

## 2020-03-05 RX ADMIN — INSULIN LISPRO 15 UNITS: 100 INJECTION, SOLUTION INTRAVENOUS; SUBCUTANEOUS at 09:43

## 2020-03-05 RX ADMIN — HYDRALAZINE HYDROCHLORIDE 25 MG: 25 TABLET, FILM COATED ORAL at 14:54

## 2020-03-05 RX ADMIN — CEFTRIAXONE SODIUM 1 G: 1 INJECTION, POWDER, FOR SOLUTION INTRAMUSCULAR; INTRAVENOUS at 14:30

## 2020-03-05 RX ADMIN — CARVEDILOL 25 MG: 25 TABLET, FILM COATED ORAL at 17:10

## 2020-03-05 RX ADMIN — CARVEDILOL 25 MG: 25 TABLET, FILM COATED ORAL at 09:40

## 2020-03-05 RX ADMIN — ATORVASTATIN CALCIUM 40 MG: 40 TABLET, FILM COATED ORAL at 09:40

## 2020-03-05 RX ADMIN — SODIUM CHLORIDE, PRESERVATIVE FREE 10 ML: 5 INJECTION INTRAVENOUS at 07:34

## 2020-03-05 RX ADMIN — IPRATROPIUM BROMIDE AND ALBUTEROL SULFATE 3 ML: 2.5; .5 SOLUTION RESPIRATORY (INHALATION) at 20:48

## 2020-03-05 RX ADMIN — HYDRALAZINE HYDROCHLORIDE 25 MG: 25 TABLET, FILM COATED ORAL at 20:12

## 2020-03-05 RX ADMIN — SODIUM CHLORIDE: 9 INJECTION, SOLUTION INTRAVENOUS at 18:09

## 2020-03-05 RX ADMIN — IPRATROPIUM BROMIDE AND ALBUTEROL SULFATE 3 ML: 2.5; .5 SOLUTION RESPIRATORY (INHALATION) at 10:13

## 2020-03-05 RX ADMIN — VANCOMYCIN HYDROCHLORIDE 2250 MG: 1 INJECTION, POWDER, LYOPHILIZED, FOR SOLUTION INTRAVENOUS at 00:27

## 2020-03-05 RX ADMIN — APIXABAN 5 MG: 5 TABLET, FILM COATED ORAL at 09:40

## 2020-03-05 RX ADMIN — INSULIN GLARGINE 70 UNITS: 100 INJECTION, SOLUTION SUBCUTANEOUS at 20:16

## 2020-03-05 RX ADMIN — ASPIRIN 81 MG: 81 TABLET, COATED ORAL at 09:40

## 2020-03-05 RX ADMIN — APIXABAN 5 MG: 5 TABLET, FILM COATED ORAL at 20:12

## 2020-03-05 RX ADMIN — OSELTAMIVIR PHOSPHATE 30 MG: 30 CAPSULE ORAL at 12:19

## 2020-03-05 RX ADMIN — IPRATROPIUM BROMIDE AND ALBUTEROL SULFATE 3 ML: 2.5; .5 SOLUTION RESPIRATORY (INHALATION) at 16:38

## 2020-03-05 RX ADMIN — FLUTICASONE PROPIONATE 2 SPRAY: 50 SPRAY, METERED NASAL at 09:40

## 2020-03-05 RX ADMIN — LEVOTHYROXINE SODIUM 75 MCG: 0.07 TABLET ORAL at 05:53

## 2020-03-05 ASSESSMENT — PAIN SCALES - GENERAL
PAINLEVEL_OUTOF10: 0

## 2020-03-05 NOTE — PROGRESS NOTES
Physical Therapy Initial Assessment     Name: Omar Patel  :   MRN: [id number]    Referring Provider:  Julianna Mc DO    Date of Service: 3/5/2020    Evaluating PT:  Mirza Orellana PT, KASSANDRA [id number]    Room #:  7404/5075-R  Diagnosis:  PNA (pneumonia) [J18.9]   PMHx/PSHx:  CAD, DM, HTN, Anxiety, GERD, CKD, COPD, DARVIN, OP, A-fib, OA bilateral knees, CHF, Fibromyalgia  Precautions:  Falls, O2, Droplet/Contact Isolation (Influenza), R foot drop (AFO at home)  Equipment Needs:  TBD at rehab    SUBJECTIVE:    Pt lives alone in a 1 story apartment with 2 steps and 1 HR to enter    OBJECTIVE:   Initial Evaluation  Date: 3/5 Treatment Short Term/ Long Term   Goals   AM-PAC 6 Clicks      Was pt agreeable to Eval/treatment? yes     Does pt have pain? Pt c/o LLE pain but did not quantify     Bed Mobility  Rolling: Mod A  Supine to sit: Mod A  Sit to supine: Mod A  Scooting: Mod A  Supervision   Transfers Sit to stand: NT  Stand to sit: NT  Stand pivot: NT  SBA   Ambulation   NT   >25 feet using AAD with SBA    Stair negotiation: ascended and descended  NT     ROM BUE:  Defer to OT  BLE:  WFL     Strength BUE:  Defer to OT  RLE:  3-/5  LLE:  3-/5  Increase by at least 1/3 MMT grade   Balance Sitting EOB:  Min<>Mod A  Dynamic Standing:  NT  Sitting EOB:  SBA  Dynamic Standing: SBA      Pt is A & O x 3  Sensation:  Pt denies numbness and tingling to extremities  Edema:  Present in BLE    Patient education  Pt educated on PT role, safety with mobility and postural reducation. Education provided on benefits of OOB activity to prevent iatrogenic effects. Patient response to education:   Pt verbalized understanding Pt demonstrated skill Pt requires further education in this area   x  x     ASSESSMENT:    Comments:  Pt was cleared by RN prior to PT evaluation. Pt was received supine and was agreeable to PT evaluation. Pt was lethargic and had difficulty staying awake during evaluation.   Pt required VCs and tactile cues to perform bed mobility. Pt required tactile cues to perform AROM at EOB. Pt quickly fatigued and requested to return to supine. Deferred STS this date due to decreased safety with pt's ability to maintain level of alertness at EOB. Pt was assisted back to supine and repositioned for comfort. Pt was left supinewith call button within reach and all needs met. Treatment:  Patient practiced and was instructed in the following treatment:     Bed mobility training - pt given verbal and tactile cues to facilitate proper sequencing and safety during rolling and supine<>sit as well as provided with physical assistance for trunk and tactile cues for BLEs to complete task    Sitting Balance EOB for <5 minutes for improved level of alertness upright tolerance, effective breathing and decreased in light headedness. Assist to maintain upright posture and to correct LOB. VCs to keep eyes open   Patient education provided as noted above. Pt's/ family goals   1. None stated. Patient and or family understand(s) diagnosis, prognosis, and plan of care. Yes     PLAN:    PT care will be provided in accordance with the objectives noted above. Exercises and functional mobility practice will be used as well as appropriate assistive devices or modalities to obtain goals. Patient and family education will also be administered as needed. Frequency of treatments: 2-5x/week x 1-2 weeks. Time in  1310  Time out  1330    Total Treatment Time  10 minutes     Evaluation Time includes thorough review of current medical information, gathering information on past medical history/social history and prior level of function, completion of standardized testing/informal observation of tasks, assessment of data and education on plan of care and goals.     CPT codes:  [] Low Complexity PT evaluation 17007  [x] Moderate Complexity PT evaluation 80714  [] High Complexity PT evaluation 60883  [] PT Re-evaluation 11990  [] Gait training 56489 0 minutes  [] Manual therapy 40403 0 minutes  [x] Therapeutic activities 31181 10 minutes  [] Therapeutic exercises 31896 0 minutes  [] Neuromuscular reeducation 86536 0 minutes     Carol Lyles PT, DPT  License [id number]

## 2020-03-05 NOTE — HOME CARE
Patient is active with [facility] and will need SHARMIN order upon discharge.  Thank you     [address]

## 2020-03-05 NOTE — CONSULTS
(3) MG/3ML SOLN nebulizer solution Inhale 3 mLs into the lungs every 4 hours (while awake) 360 mL 0    fluticasone (FLONASE) 50 MCG/ACT nasal spray 2 sprays by Nasal route every morning 1 Bottle 5    loperamide (IMODIUM) 2 MG capsule Take 1 capsule by mouth 4 times daily as needed for Diarrhea (Patient not taking: Reported on 2/20/2020) 120 capsule 5    aspirin 81 MG tablet Take 81 mg by mouth every morning          Allergies: Iv [iodides]; Codeine; Cymbalta [duloxetine hcl]; Gabapentin; Hydrocodone; Morphine; Pradaxa [dabigatran etexilate mesylate]; Oxycontin [oxycodone hcl]; and Penicillins    Social History:     reports that she quit smoking about 4 years ago. Her smoking use included cigarettes. She started smoking about 58 years ago. She has a 27.50 pack-year smoking history. She has never used smokeless tobacco. She reports previous alcohol use. She reports that she does not use drugs.     Family History:         Problem Relation Age of Onset    Breast Cancer Mother     Diabetes Mother     Stroke Maternal Grandmother     Diabetes Maternal Grandmother     Diabetes Sister        ROS:     General: no fever, chills   Heent: no nasal congestion, sore throat   Resp: no cough, sob , hemoptysis  Cardiac: no cp , le edema, palpitations  Gi: no nausea, vomiting, melena, abd pain, hematemesis  Gu: no hematuria, dysuria   Neruo: no numbness, weakness, headache, blurry vision   Endocrine:  no h/o dm  Derm: no rash , petechia  Heme: no epistaxis, bruising  All other sx negative     Physical Exam:      Patient Vitals for the past 24 hrs:   BP Temp Temp src Pulse Resp SpO2 Height Weight   03/05/20 0733 (!) 140/90 97.8 °F (36.6 °C) Temporal 106 24 100 % -- --   03/05/20 0545 (!) 148/88 99.1 °F (37.3 °C) Oral 107 28 99 % -- --   03/04/20 2215 (!) 157/92 97.8 °F (36.6 °C) Oral 117 24 99 % 5' 3\" (1.6 m) --   03/04/20 2130 (!) 154/95 98.9 °F (37.2 °C) Oral 102 18 100 % -- --   03/04/20 2024 (!) 153/97 -- -- 97 18 95 % -- --   03/04/20 1646 -- -- -- -- 28 100 % -- --   03/04/20 1645 -- -- -- -- (!) 31 100 % -- --   03/04/20 1644 -- -- -- -- 26 100 % -- --   03/04/20 1531 (!) 154/92 96.1 °F (35.6 °C) Temporal 115 16 98 % -- 240 lb (108.9 kg)         Intake/Output Summary (Last 24 hours) at 3/5/2020 1036  Last data filed at 3/5/2020 0330  Gross per 24 hour   Intake 720 ml   Output --   Net 720 ml       Constitutional: Patient in no acute distress   Head: normocephalic, atraumatic   Neck: supple, no jvd  Cardiovascular: regular rate and rhythm, no murmurs, gallops, or rubs   Respiratory: Clear, no rales, rhochi, or wheezes,   Gastrointestinal: soft, nontender, nondistended, no hepatosplenomegaly  Ext: tr edema  Neuro: aaox3  Skin: dry, no rash   Back: nontender    Data:    Recent Labs     03/04/20  1614 03/05/20  0503   WBC 8.2 9.3   HGB 9.0* 8.7*   HCT 29.9* 28.9*   MCV 88.5 89.2    133       Recent Labs     03/04/20  1614 03/05/20  0502    136   K 5.7* 5.5*    102   CO2 19* 21*   CREATININE 2.3* 2.2*   BUN 69* 69*   LABGLOM 25 26   GLUCOSE 310* 221*   CALCIUM 8.6 8.6       Vit D, 25-Hydroxy   Date Value Ref Range Status   08/23/2019 48 30 - 100 ng/mL Final     Comment:     <20 ng/mL. ........... Daniel Morita Deficient  20-30 ng/mL. ......... Daniel Morita Insufficient   ng/mL. ........ Daniel Morita Sufficient  >100 ng/mL. .......... Daniel Morita Toxic         PTH   Date Value Ref Range Status   01/04/2018 90 (H) 15 - 65 pg/mL Final       Recent Labs     03/04/20  1614   ALT 74*   AST 76*   ALKPHOS 101   BILITOT 0.6       Recent Labs     03/04/20  1614   LABALBU 3.5       No results found for: FERRITIN, IRON, TIBC    No results found for: [id number]    No results found for: FOLATE      Lab Results   Component Value Date    COLORU Yellow 03/04/2020    NITRU Negative 03/04/2020    GLUCOSEU 500 03/04/2020    KETUA Negative 03/04/2020    UROBILINOGEN 0.2 03/04/2020    BILIRUBINUR Negative 03/04/2020       No results found for: JEFFREY, [address] [facility], [address] [place],

## 2020-03-05 NOTE — CONSULTS
Advanced Heart Failure and Pulmonary Hypertension Consult          Reason for Consultation: chronic HFrEF, CAD, here with possible syncope        Primary Cardiologist: Dr. She Smith Cardiologist : Dr. Ken Anthony. History of Present Illness: Neil Conde is a pleasant 76year old with extensive cardiac history including heart failure with reduced ejection fraction; mild to moderate mitral regurgitation with a posteriorly directed jet, CABG with patent LIMA to LAD, occluded vein grafts and inability to stent the LCX in the past, coronary artery disease; valvular heart disease; and chronic atrial fibrillation. Her activity tolerance is limited by diffuse arthritic pains, chronic back pain, and exertional dyspnea. She is status post CardioMEMS implantation for the randomized arm of the GUIDE-HF study. She was recently hospitalized with uncontrolled diabetes mellitus and acute renal insufficiency, improved with readjustment of T2DM meds and IVF and discharged to home. Yesterday had an unwitnessed fall. Now CK is > 1000, elevated TANO. Respiratory panel positive for influenza A. Patient Active Problem List    Diagnosis Date Noted    LVH (left ventricular hypertrophy) 01/16/2017     Priority: High     Dr Ulises Hayes Chronic heart failure with preserved ejection fraction (HFpEF) ([facility] 01/16/2017     Priority: High    Nonrheumatic mitral valve regurgitation 10/22/2019     Priority: Medium     1. Echo, 12/09/2016, LVEF biplane = 49%. AF. Elevated LA pressure. RVSP NA. Mild to  moderate MR.   2. Echo, 02/03/2017, biplane EF = 47%.  Moderate MR.  ERO = 0.22 cm². TR velocity 3.76.   LA size = 55 cc/m².       Valvular heart disease 01/16/2017     Priority: Medium     Dilated RV, thickened MV, mod-severe MR-Dr Ulises Hayes Obstructive sleep apnea      Priority: Medium    Permanent atrial fibrillation 12/07/2016     Priority: Medium     Dr Ulises Hayes HTN 3/4/2020 22:18 3/4/2020 23:48 3/5/2020 05:02   Sodium 135   136   Potassium 5.7 (H)   5.5 (H)   Chloride 103   102   CO2 19 (L)   21 (L)   BUN 69 (H)   69 (H)   Creatinine 2.3 (H)   2.2 (H)   Anion Gap 13   13   GFR Non- 25   26   GFR African American 25   26   Lactic Acid, Sepsis 2.2 (H) 1.0     Glucose 310 (H)   221 (H)   Calcium 8.6   8.6   Total Protein 6.1 (L)      Procalcitonin 1.30 (H)      Meter Glucose   221 (H)    Total CK 1,201 (H)   940 (H)   Pro-BNP 44,760 (H)      Troponin 0.08 (H)      Albumin 3.5      Alk Phos 101      ALT 74 (H)      AST 76 (H)      Bilirubin 0.6      Lipase 17          IMAGING:    CXR (2019)  FINDINGS:   Cardiac air is diffusely enlarged. The slight prominence of  perihilar vessels seen. There is no pleural effusions. No acute infiltrates or consolidations are observed the. Impression  Persistent findings for impending decompensated congestive  heart failure. No significant changes since . CARDIAC TESTIN2018 TTE -    Ejection fraction biplane = 53%.   The left atrium is severely dilated.   Atrial fibrillation   Elevated L atrial pressure   Severe posteriorly directed eccentric mitral regurgitation.   Probable MV posterior leaflet restriction   The aortic valve appears mildly sclerotic.   No pericardial effusion. ECG 2018: atrial fibrillation, inferior infarction age undetermined, poor R wave progression          ASSESSMENT:  1. Influenza A      2. Unwitnessed fall  -likely due to volume depletion from # 1 above      3. Elevated cardiac biomarkers  -elevated Ck due to fall? 4. Chronic HFrEF  -euvolemic if not volume depleted      5. Moderate MR       6. CAD, CABG history. Cath 2013 at [facility] showing native 3 vessel CAD with patent LIMA-LAD and patent stent in RCA with 3 occluded vein grafts, FAILED percutaneous intervention of the native circumflex, chronic total occlusion (medical management).        7. julita on ckd  -possible volume depletion based on history and exam        8. Significant MSK issues  Chronic atrial fibrillation   anticoagulated with Eliquis      9. Systemic hypertension  -controlled      10. COPD       6. Hypothyroidism   -Dyslipidemia   -Type II DM with peripheral neuropathy      12. Chronic dizziness has prn antivert   -BMI 41.5  -Morbid obesity        13. t2DM        PLAN:  Stop diuretics  Agree w gentle ivf per nephro  Resume other meds  Will fu closely as outpatient to determine whether at that time would need readjustment of her diuretics    Thank you for this consultation. Please do not hesitate to contact us with any questions. Stasia Cranker, M.D.  [facility]  Heart Failure and Pulmonary Hypertension  Mobile [phone]

## 2020-03-05 NOTE — CONSULTS
methicillin resistant Staphylococcus aureus infection 2011    Hyperlipidemia     Hyperplastic colon polyp     Hypertension     Hypokalemia     Hypothyroidism     Iron deficiency anemia     Left atrial dilation 2017    Lumbar herniated disc     LVH (left ventricular hypertrophy) 2017    Microalbuminuria 2012    STACEY (obstructive sleep apnea)     currently not using CPAP    Osteoporosis     Peripheral neuropathy     Post laminectomy syndrome     Primary osteoarthritis involving multiple joints 2017    Primary osteoarthritis of both knees 3/3/2017    Proteinuria     Pulmonary hypertension ([address] 2017    Syncope     Tobacco abuse     Valvular heart disease 2017    Dilated RV, thickened MV, mod-severe MR    Vitamin D deficiency      Past Surgical History:        Procedure Laterality Date    APPENDECTOMY      reportedly done at time of last  in      BACK SURGERY  07/2003    x2    [facility]ARD[facility]AC [facility]IZATION  2012    [facility]    CARDIOVASCULAR STRESS TEST  , ,    [address] Bilateral     done at [address] [place] [address] Avenue      x4    COLONOSCOPY  2014, 2008, 2003    [facility] Dr Mariana Valencia-hyperplastic polyp, sigmoid diverticula, external hemorrhoid-repeat 5 yrs (2019)    CORONARY ANGIOPLASTY WITH STENT PLACEMENT  2013    [facility]    CORONARY ARTERY BYPASS GRAFT      quadruple bypass @ [address] [place] [address]  2016    LVH EF 45-50%    Narda Valero      Dr. Kel Ruiz in [address] [place] [address] Rd  5/20/15, 2013, 13    Dr Mabel Bonner @ [facility]  2017    Dr Tobar-GERD/HIATAL HERNIA/gastritis     Current Medications:   Current Facility-Administered Medications: cefepime (MAXIPIME) 2 g IVPB extended (mini-bag), 2 g, Intravenous, Once  vancomycin (VANCOCIN) 2,250 mg in dextrose 5 % 500 mL IVPB, 20 mg/kg, Intravenous, Once  doxycycline (VIBRAMYCIN) 100 mg in dextrose 5 % 100 mL IVPB, 100 mg, Intravenous, Once  0.9 % sodium chloride infusion, , Intravenous, Once  Allergies: Iv [iodides]; Codeine; Cymbalta [duloxetine hcl]; Gabapentin; Hydrocodone; Morphine; Pradaxa [dabigatran etexilate mesylate]; Oxycontin [oxycodone hcl]; and Penicillins    Social History:   TOBACCO:   reports that she quit smoking about 4 years ago. Her smoking use included cigarettes. She started smoking about 58 years ago. She has a 27.50 pack-year smoking history. She has never used smokeless tobacco.  ETOH:   reports previous alcohol use. DRUGS:   reports no history of drug use.   ACTIVITIES OF DAILY LIVING:    OCCUPATION:    Family History:       Problem Relation Age of Onset    Breast Cancer Mother     Diabetes Mother     Stroke Maternal Grandmother     Diabetes Maternal Grandmother     Diabetes Sister        REVIEW OF SYSTEMS:  CONSTITUTIONAL:  negative for  fevers, chills  EYES:  negative for blurred vision, visual disturbance  HEENT:  negative for  hearing loss, voice change  RESPIRATORY:  negative for  dyspnea, wheezing  CARDIOVASCULAR:  negative for  chest pain, palpitations  GASTROINTESTINAL:  negative for nausea, vomiting  GENITOURINARY:  negative for frequency, urinary incontinence  HEMATOLOGIC/LYMPHATIC:  negative for bleeding and petechiae  MUSCULOSKELETAL:  positive for  pain  NEUROLOGICAL:  negative for headaches, dizziness  BEHAVIOR/PSYCH:  negative for increased agitation and anxiety    PHYSICAL EXAM:    VITALS:  BP (!) 154/92   Pulse 115   Temp 96.1 °F (35.6 °C) (Temporal)   Resp 28   Wt 240 lb (108.9 kg)   SpO2 100%   BMI 41.85 kg/m²   CONSTITUTIONAL:  awake, alert, cooperative, no apparent distress, and appears stated age  MUSCULOSKELETAL:  Left lower Extremity:  · Positive tenderness to palpation at the lateral aspect of the left hip, the left knee, and the left ankle  · No deformity  · Skin

## 2020-03-05 NOTE — CONSULTS
[facility] CONSULT NOTE    Reason for Consult: Pneumonia  Requesting Physician: Dr. Deric Paulino    Chief complaint: Weakness    History Obtained From: Patient and EMR     HISTORY Danny              The patient is a 76 y.o. female with history of CKD, atrial fibrillation, CAD, CHF, COPD, DM, hypertension, hyperlipidemia, previously admitted from 02/25-03/02 for hyperglycemia and decreased oral intake found to be in DARVIN, presented on 03/04 after she fell on her way to the bathroom and was unable to get up. She reports chills and worsening productive cough. On admission, she was afebrile and hemodynamically stable with no leukocytosis. Respiratory pathogen PCR panel was positive for influenza A. Urinalysis showed insignificant pyuria. Urine culture showed ,000 cfu/mL of mixed sabina including Gram-positive organisms and Gram-negative rods. CT of the abdomen and pelvis showed right lower lobe pulmonary consolidation, minimal opacities in the left lower lobe. She was given a dose of cefepime, doxycycline and vancomycin. Oseltamivir was started. ID service was subsequently consulted for further recommendations.     Past Medical History  Past Medical History:   Diagnosis Date    Adenoma of left adrenal gland     Adenoma of left adrenal gland     Dr Zulay Crystal at [facility]AR[facility] (acute kidney injury) ([address]     Albuminuria     Anterior myocardial infarction ([address]     Anxiety 1/16/2017    Asthma     Atrial fibrillation (HCC)     CAD (coronary artery disease)     CKD (chronic kidney disease) stage 3, GFR 30-59 ml/min ([facility])     Congestive heart failure, NYHA class 3 and ACC/AHA stage C ([address] 1/16/2017    COPD (chronic obstructive pulmonary disease) ([address]     Dementia ([address]     patient denies, per Dr Digna Davis, will remove Dx    Diabetes mellitus ([address]     Diverticular disease     Diverticulitis     Dry eye     follows with Dr. Demetra Londono at [facility] Elevated plasma metanephrines     Esophagitis, [place] Comment: denies   Social History Narrative    She ambulates with a cane at times, and sometimes a rollator. She had smoked for over 50 years, and smoked on average 1 pack every three days    She denies alcohol or illicit drug use     She lives with her daughter                 Moved from [place], [place] to [place] and lives with daughter Victorina Engel        Nurse Navigator with [employer] insurance (Claudia Khan RN) [phone] (phone) [phone] (fax)            She ambulates with a cane at times, and sometimes a rollator. She had smoked for over 50 years, and smoked on average 1 pack every three days    She denies alcohol or illicit drug use     She lives with her daughter             Family History:     Mother  of breast cancer and was a diabetic    One sister with history of diabetes        Mother  of breast cancer and was a diabetic    One sister with history of diabetes            Drinks occ cup of coffee/Pepsi       Family Medical History  Family History   Problem Relation Age of Onset    Breast Cancer Mother     Diabetes Mother     Stroke Maternal Grandmother     Diabetes Maternal Grandmother     Diabetes Sister        Review of Systems:  Constitutional: No fever, had chills  Eyes: No vision changes, no retroorbital pain  ENT: No hearing changes, no ear pain  Respiratory: Has cough, no dyspnea  Cardiovascular: Had chest pain, no palpitations  Gastrointestinal: Had abdominal pain, no diarrhea  Genitourinary: No dysuria, no hematuria  Integumentary: No rash, no itching  Musculoskeletal: No muscle pain, no joint pain  Neurologic: Has headache, no numbness in extremities    Physical Examination:  Vitals:    20 2215 20 0545 20 0733 20 1430   BP: (!) 157/92 (!) 148/88 (!) 140/90 (!) 116/57   Pulse: 117 107 106 95   Resp: 24 28 24 24   Temp: 97.8 °F (36.6 °C) 99.1 °F (37.3 °C) 97.8 °F (36.6 °C) 97.2 °F (36.2 °C)   TempSrc: Oral Oral Temporal Temporal   SpO2: 99% 99% 100% 99%   Weight:       Height: 5' 3\" (1.6 m)        Constitutional: Alert, not in distress  Eyes: Sclerae anicteric, no conjunctival erythema  ENT: No buccal lesion, no pharyngeal exudates  Neck: No nuchal rigidity, no cervical adenopathy  Lungs: Clear breath sounds, no crackles, no wheezes  Heart: Regular rate and rhythm, no murmurs  Abdomen: Bowel sounds present, soft, nontender  Skin: Warm and dry, no active dermatoses  Musculoskeletal: No joint erythema, no joint swelling    Labs, imaging, and medical records/notes were personally reviewed. Assessment:  Influenza A infection  Asymptomatic bacteriuria    Plan:  Continue oseltamivir at 75 mg q24h. Follow up blood cultures. Check sputum culture. Thank you for involving me in the care of [facility] [place]. I will continue to follow. Please do not hesitate to call for any questions or concerns.     Electronically signed by Marvel Salamanca MD on 3/5/2020 at 4:25 PM

## 2020-03-05 NOTE — PROGRESS NOTES
OCCUPATIONAL THERAPY INITIAL EVALUATION      Date:3/5/2020  Patient Name: Rosaura Paulson  MRN: [id number]  : 1945  Room: [address] [place] [address]    Referring Physician:  Henderson Severs, DO    Evaluating OT:  TOM Horvath, OTR/L #[id number]      AM-PAC Daily Activity Raw Score:    Recommended Adaptive Equipment:  TBD as pt progresses     Reason for Admission:  Pt was readmitted after going home alone after recent D/C, began coughing and became SOB. Be Quale to the floor and remained on the ground for an extended time    Diagnosis:  PNA, Influenza     Procedures this admission:  None     Pertinent Medical History:  Extensive History including MI, A Fib, DM, Back Surgery  Recent admission 20 - 3/2/20    Precautions:  Falls  Droplet Precautions - (+) Influenza  WBAT Malick LEs  Cardiac Diet  Hx of Right Foot Drop/AFO    Home Living: Pt lives Alone in a single-level apartment with 2 ROSALINA and 1 HR/s. Bed/bath on the main floor. Pt sleeps in a chair. Bathroom setup:  Tub-Shower, Standard Commode   Equipment owned:  Extended tub bench, Rollator, W/C, Sock Aid, Reacher    Available Family Assist:  Dtr provides assist PRN. Has Aides Service M-F 10AM-2:30 - assist w/ bathing, Home mgmt. Pt is home alone for most of the day    Prior Level of Function:  Pt was IND with Dressing, Toileting, SUP for bathing/tub transfer, IND with Light IADLs (Light meal prep/home mgmt), Transfers and Mobility using Rollator for household ambulation. Driving:  No  Occupation:  None reported    Pain Level:  5/10 Throat and Stomach, denied Head/neck/extremity pain ;  Nsg Notified   Additional Complaints:  Lethargy, Severe Dizziness.   Pt having difficulty keeping eyes open while seated EOB    Vitals/Lab Values:  HR ~  during ax, O2 sats % w/ NC O2 4 LPM    Cognition: A & O x 4   Able to Follow Multi-Step Commands w/ Occasional min VCs d/t lethargy   Memory:  good (-)   Sequencing:  good (-)   Problem solving:  good (-)   Judgement/safety:  fair  (+)  Additional Comments:  Pt was pleasant, cooperative.   Reporting agreement w/ participation in post-acute therapy program at d/c this admission       Functional Assessment:   Initial Eval Status  Date: 3-5-20 Treatment Status  Date: Short Term/Long Term Goals  Treatment frequency: PRN 2-4 x/week  1-2 weeks   Feeding SUP/Set up    Able to feed self, required assist to open some containers on tray  IND   Grooming Min A/Set up    Required min A for ax while seated EOB d/t severity of dizziness and lethargy, unsafe to attempt to ambulate to or stand for ax  Min A for steadying, set up for task  Standing At The Sink   UB Dressing Mod A/Set up    Required Mod A to thread UEs into garment, wrap garment around back while seated EOB, Close SUP for safety w/ sitting balance d/t lethargy/dizziness  Min A   LB Dressing Max A    Required Max A to don socks, thread LEs into undergarments seated EOB, Mod A for standing balance + Max A for clothing adjustment over hips, unable to safely release grasp from [employer] d/t dizziness  Mod A   Bathing NT    Pt declined  Mod A   Toileting Max A    Crawford Catheter  Mod A for standing balance + Max A for clothing adjustment over hips  Mod A   Bed Mobility  Rolling:  Min A  Repositioning:  Min A   Supine to Sit:  Min A    Sit to Supine:  Min A     Required Min A/Mod VCs for use of side-rail (pt sleeps in chair at baseline)   SUP   Functional Transfers Sit to stand:  Min A  Stand to sit:  Min A      Able to stand briefly from EOB 3-4x  Pt ed re: safety/hand placement  Close SUP to scoot along EOB  SUP   Functional Mobility Min A w/ WW    Only able to tolerate 2 steps along EOB  SUP   Balance Sitting:       Static:  Close SUP    Dynamic:  Close SUP w/ functional ax    Standing:       Static:  Min A w/ FWW    Dynamic:  Min A w/ FWW w/ functional ax/mobility     Activity Tolerance Tolerated Sitting:  EOB ~ 20 mins w/ functional ax  Tolerated Standing:  ~ 15-30 ~ 2x secs w/ functional ax     Visual/  Perceptual WFL  Glasses:  Reading      Hearing WFL  Hearing Aids  No       Hand dominance: Right    UE ROM: RUE:  WFL      LUE:  WFL    Strength: RUE: grossly 4/5     LUE: grossly 4/5     Strength:  WFL Malick UEs    Fine Motor Coordination:  WFL Malick UEs    Sensation:  Denies numbness or tingling Malick UEs  Tone:  WFL Malick UEs  Edema:  None Noted                            Upon arrival, pt was found in semi-supine. She was agreeable to participate in therapeutic ax. No family members present during session. Received permission from RN prior to engaging pt in OT services. Treatment:      Provided Skilled SUP/Assist w/ Pt safety, Proper Positioning, ADLs, Transfers and Functional Mobility as noted above, as well as set up and clean up for session. Skilled monitoring of Vitals and pts response to treatment. Consulted RNSHI    -- Education:  Provided Pt/Family ed re: Benefits/Purpose of OT services;  OT Plan of Care;  Safety w/ Functional Transfers; Walker safety w/ Functional ax/mobility; Benefits of/Techs for use of DME/AD/Adaptive equip/techs to increase safety/IND with Functional Ax; Techs to increase Safety/Safety Awareness w/ Functional Ax; Energy Conservation Techs/Pursed-Lip Breathing;  Pain mgmt Techs;  Benefits of Cont'd Participation in OT services during hospitalization and at D/C      Pt and/or Family verbalized/demonstrated a Good(-) understanding of education provided. Will Review PRN. At the end of the session, patient was properly positioned in Semi-Supine. Call light and phone within reach, all lines and tubes intact. Oriented pt to call bell. Made all appropriate Environmental Modifications to facilitate pt's level of IND and safety. All needs met. Bed Alarm activated.            Assessment of current deficits   Functional mobility [x]  ADLs [x] Strength [x]  Cognition []  Functional transfers  [x] IADLs [x] Safety Awareness [x]  Endurance manage/training  45792     Non-Billable Time     Total Timed Treatment 24 2         Juan Grey [place], OTR/L #[id number]

## 2020-03-06 LAB
ANION GAP SERPL CALCULATED.3IONS-SCNC: 10 MMOL/L (ref 7–16)
BUN BLDV-MCNC: 57 MG/DL (ref 8–23)
CALCIUM SERPL-MCNC: 8.1 MG/DL (ref 8.6–10.2)
CHLORIDE BLD-SCNC: 103 MMOL/L (ref 98–107)
CO2: 23 MMOL/L (ref 22–29)
CREAT SERPL-MCNC: 1.8 MG/DL (ref 0.5–1)
GFR AFRICAN AMERICAN: 33
GFR NON-AFRICAN AMERICAN: 33 ML/MIN/1.73
GLUCOSE BLD-MCNC: 94 MG/DL (ref 74–99)
METER GLUCOSE: 109 MG/DL (ref 74–99)
METER GLUCOSE: 120 MG/DL (ref 74–99)
METER GLUCOSE: 153 MG/DL (ref 74–99)
METER GLUCOSE: 75 MG/DL (ref 74–99)
METER GLUCOSE: 84 MG/DL (ref 74–99)
POTASSIUM SERPL-SCNC: 4.8 MMOL/L (ref 3.5–5)
SODIUM BLD-SCNC: 136 MMOL/L (ref 132–146)
TOTAL CK: 423 U/L (ref 20–180)

## 2020-03-06 PROCEDURE — 2060000000 HC ICU INTERMEDIATE R&B

## 2020-03-06 PROCEDURE — 6370000000 HC RX 637 (ALT 250 FOR IP): Performed by: INTERNAL MEDICINE

## 2020-03-06 PROCEDURE — 97530 THERAPEUTIC ACTIVITIES: CPT

## 2020-03-06 PROCEDURE — 82550 ASSAY OF CK (CPK): CPT

## 2020-03-06 PROCEDURE — 80048 BASIC METABOLIC PNL TOTAL CA: CPT

## 2020-03-06 PROCEDURE — 82962 GLUCOSE BLOOD TEST: CPT

## 2020-03-06 PROCEDURE — 97535 SELF CARE MNGMENT TRAINING: CPT

## 2020-03-06 PROCEDURE — 2580000003 HC RX 258: Performed by: INTERNAL MEDICINE

## 2020-03-06 PROCEDURE — 94640 AIRWAY INHALATION TREATMENT: CPT

## 2020-03-06 PROCEDURE — 36415 COLL VENOUS BLD VENIPUNCTURE: CPT

## 2020-03-06 RX ORDER — OSELTAMIVIR PHOSPHATE 75 MG/1
75 CAPSULE ORAL 2 TIMES DAILY
Qty: 8 CAPSULE | Refills: 0
Start: 2020-03-06 | End: 2020-03-10

## 2020-03-06 RX ORDER — OSELTAMIVIR PHOSPHATE 75 MG/1
75 CAPSULE ORAL 2 TIMES DAILY
Status: DISCONTINUED | OUTPATIENT
Start: 2020-03-06 | End: 2020-03-09 | Stop reason: HOSPADM

## 2020-03-06 RX ORDER — INSULIN GLARGINE 100 [IU]/ML
60 INJECTION, SOLUTION SUBCUTANEOUS NIGHTLY
Status: DISCONTINUED | OUTPATIENT
Start: 2020-03-06 | End: 2020-03-09 | Stop reason: HOSPADM

## 2020-03-06 RX ADMIN — SODIUM CHLORIDE: 9 INJECTION, SOLUTION INTRAVENOUS at 15:43

## 2020-03-06 RX ADMIN — OSELTAMIVIR PHOSPHATE 75 MG: 75 CAPSULE ORAL at 20:15

## 2020-03-06 RX ADMIN — OSELTAMIVIR PHOSPHATE 75 MG: 75 CAPSULE ORAL at 08:28

## 2020-03-06 RX ADMIN — ATORVASTATIN CALCIUM 40 MG: 40 TABLET, FILM COATED ORAL at 08:29

## 2020-03-06 RX ADMIN — IPRATROPIUM BROMIDE AND ALBUTEROL SULFATE 3 ML: 2.5; .5 SOLUTION RESPIRATORY (INHALATION) at 08:47

## 2020-03-06 RX ADMIN — APIXABAN 5 MG: 5 TABLET, FILM COATED ORAL at 08:28

## 2020-03-06 RX ADMIN — APIXABAN 5 MG: 5 TABLET, FILM COATED ORAL at 20:15

## 2020-03-06 RX ADMIN — HYDRALAZINE HYDROCHLORIDE 25 MG: 25 TABLET, FILM COATED ORAL at 08:29

## 2020-03-06 RX ADMIN — HYDRALAZINE HYDROCHLORIDE 25 MG: 25 TABLET, FILM COATED ORAL at 13:40

## 2020-03-06 RX ADMIN — LEVOTHYROXINE SODIUM 75 MCG: 0.07 TABLET ORAL at 08:30

## 2020-03-06 RX ADMIN — FAMOTIDINE 20 MG: 20 TABLET, FILM COATED ORAL at 08:30

## 2020-03-06 RX ADMIN — BENZOCAINE AND MENTHOL 1 LOZENGE: 15; 3.6 LOZENGE ORAL at 15:44

## 2020-03-06 RX ADMIN — IPRATROPIUM BROMIDE AND ALBUTEROL SULFATE 3 ML: 2.5; .5 SOLUTION RESPIRATORY (INHALATION) at 20:24

## 2020-03-06 RX ADMIN — CARVEDILOL 25 MG: 25 TABLET, FILM COATED ORAL at 08:29

## 2020-03-06 RX ADMIN — ASPIRIN 81 MG: 81 TABLET, COATED ORAL at 08:29

## 2020-03-06 RX ADMIN — IPRATROPIUM BROMIDE AND ALBUTEROL SULFATE 3 ML: 2.5; .5 SOLUTION RESPIRATORY (INHALATION) at 04:19

## 2020-03-06 RX ADMIN — IPRATROPIUM BROMIDE AND ALBUTEROL SULFATE 3 ML: 2.5; .5 SOLUTION RESPIRATORY (INHALATION) at 13:17

## 2020-03-06 RX ADMIN — CARVEDILOL 25 MG: 25 TABLET, FILM COATED ORAL at 18:06

## 2020-03-06 RX ADMIN — IPRATROPIUM BROMIDE AND ALBUTEROL SULFATE 3 ML: 2.5; .5 SOLUTION RESPIRATORY (INHALATION) at 16:09

## 2020-03-06 RX ADMIN — FLUTICASONE PROPIONATE 2 SPRAY: 50 SPRAY, METERED NASAL at 08:32

## 2020-03-06 RX ADMIN — HYDRALAZINE HYDROCHLORIDE 25 MG: 25 TABLET, FILM COATED ORAL at 20:15

## 2020-03-06 ASSESSMENT — PAIN SCALES - GENERAL
PAINLEVEL_OUTOF10: 0

## 2020-03-06 NOTE — PROGRESS NOTES
sign off for now. Please do not hesitate to call for any questions, concerns, or new findings.     Electronically signed by Petra Madsen MD on 3/6/2020 at 9:59 AM

## 2020-03-06 NOTE — PROGRESS NOTES
fluctuated between 109-205 sitting EOB throughout session. Verbal cues to slow breathing. Pt demo high anxiety throughout session. Changed pt's gown and socks at EOB. Verbal cues for hand placement during transfers. Upon taking 2nd step pt's R knee buckled and pt abruptly returned to sitting EOB. Pt then scooted towards [facility] and was returned to supine d/t fatigue and high HR. PLAN:    Patient is making limited progress towards established goals. Will continue with current POC.       Time in  0915  Time out  0940    Total Treatment Time  25 minutes     CPT codes:  [] Gait training 50175  minutes  [] Manual therapy 20199 minutes  [x] Therapeutic activities 37709 25 minutes  [] Therapeutic exercises 72638 0 minutes  [] Neuromuscular reeducation 95332 minutes    Tana Ford PTA [id number]

## 2020-03-06 NOTE — PROGRESS NOTES
apixaban  5 mg Oral BID    aspirin  81 mg Oral QAM    atorvastatin  40 mg Oral Daily    carvedilol  25 mg Oral BID WC    famotidine  20 mg Oral Daily    fluticasone  2 spray Nasal QAM    hydrALAZINE  25 mg Oral TID    ipratropium-albuterol  1 vial Inhalation Q4H WA    levothyroxine  75 mcg Oral Daily    sodium chloride flush  10 mL Intravenous 2 times per day        dextrose      sodium chloride 50 mL/hr at 03/06/20 1543       Meds prn:     glucose, dextrose, glucagon (rDNA), dextrose, benzocaine-menthol, sodium chloride flush, acetaminophen **OR** acetaminophen, polyethylene glycol, promethazine **OR** ondansetron    Meds prior to admission:     No current facility-administered medications on file prior to encounter. Current Outpatient Medications on File Prior to Encounter   Medication Sig Dispense Refill    carvedilol (COREG) 25 MG tablet Take 1 tablet by mouth 2 times daily (with meals) 60 tablet 5    insulin glargine (BASAGLAR KWIKPEN) 100 UNIT/ML injection pen Inject 70 Units into the skin nightly 6 pen 5    insulin lispro (HUMALOG) 100 UNIT/ML injection vial Inject 15 Units into the skin 3 times daily (before meals) 1 vial 3    hydrALAZINE (APRESOLINE) 25 MG tablet Take 1 tablet by mouth 3 times daily 90 tablet 0    atorvastatin (LIPITOR) 40 MG tablet Take 1 tablet by mouth daily 30 tablet 5    levothyroxine (LEVOTHROID) 75 MCG tablet Take 1 tablet by mouth daily 90 tablet 5    apixaban (ELIQUIS) 5 MG TABS tablet Take 1 tablet by mouth 2 times daily 60 tablet 5    albuterol sulfate  (90 Base) MCG/ACT inhaler Inhale 2 puffs into the lungs every 6 hours as needed for Wheezing 3 Inhaler 4    famotidine (PEPCID) 20 MG tablet Take 1 tablet by mouth daily 60 tablet 5    nitroGLYCERIN (NITROSTAT) 0.4 MG SL tablet Place 1 tablet under the tongue every 5 minutes as needed for Chest pain up to max of 3 total doses. If no relief after 1 dose, call 911.  (Patient not taking: Reported on filed at 3/6/2020 1358  Gross per 24 hour   Intake 1167.91 ml   Output 1425 ml   Net -257.09 ml       General: Patient somnolent, arouses easily  Head: normocephalic, atraumatic   Neck: supple, no jvd  Cardiovascular: regular rate and rhythm, no murmurs, gallops, or rubs   Respiratory: diffuse wheezes and crackles  Gastrointestinal: soft, nontender, nondistended, no hepatosplenomegaly  Ext: tr edema  Neuro: somnolent, arouses easily  Skin: dry, no rash   Back: nontender    Data:    Recent Labs     03/04/20  1614 03/05/20  0503   WBC 8.2 9.3   HGB 9.0* 8.7*   HCT 29.9* 28.9*   MCV 88.5 89.2    133       Recent Labs     03/04/20  1614 03/05/20  0502 03/06/20  0421    136 136   K 5.7* 5.5* 4.8    102 103   CO2 19* 21* 23   CREATININE 2.3* 2.2* 1.8*   BUN 69* 69* 57*   LABGLOM 25 26 33   GLUCOSE 310* 221* 94   CALCIUM 8.6 8.6 8.1*       Vit D, 25-Hydroxy   Date Value Ref Range Status   08/23/2019 48 30 - 100 ng/mL Final     Comment:     <20 ng/mL. ........... Gracie Marten Deficient  20-30 ng/mL. ......... Gracie Marten Insufficient   ng/mL. ........ Gracie Marten Sufficient  >100 ng/mL. .......... Gracie Marten Toxic         PTH   Date Value Ref Range Status   01/04/2018 90 (H) 15 - 65 pg/mL Final       Recent Labs     03/04/20  1614   ALT 74*   AST 76*   ALKPHOS 101   BILITOT 0.6       Recent Labs     03/04/20  1614   LABALBU 3.5       No results found for: FERRITIN, IRON, TIBC    No results found for: [id number]    No results found for: FOLATE      Lab Results   Component Value Date    COLORU Yellow 03/04/2020    NITRU Negative 03/04/2020    GLUCOSEU 500 03/04/2020    KETUA Negative 03/04/2020    UROBILINOGEN 0.2 03/04/2020    BILIRUBINUR Negative 03/04/2020       No results found for: JARETT MURPHY MACREATRATIO, OSMOU        Assessment and Plan:    1.) Acute on Chronic Renal Failure - resolving   CKD stage IV,  Creatinine Baseline 1.7-.2.3  Hold lisinopril   Cr now improved to baseline; will continue gentle hydration  Monitor I's/O's, urine

## 2020-03-06 NOTE — PROGRESS NOTES
cooperative. Very impulsive. Functional Assessment:    Initial Eval Status  Date: 3-5-20 Treatment Status  Date:  3/6/20 Short Term/Long Term Goals  Treatment frequency: PRN 2-4 x/week  1-2 weeks   Feeding SUP/Set up     Able to feed self, required assist to open some containers on tray  Min A; To complete self feeding with min A to increase intake. IND   Grooming Min A/Set up     Required min A for ax while seated EOB d/t severity of dizziness and lethargy, unsafe to attempt to ambulate to or stand for ax  Min A;    Min A with Min verbal prompting to wash face and hands while sitting up on the EOB. Min A for steadying, set up for task  Standing At The Sink   UB Dressing Mod A/Set up     Required Mod A to thread UEs into garment, wrap garment around back while seated EOB, Close SUP for safety w/ sitting balance d/t lethargy/dizziness  Mod A; To colin/doff gown with Min A and increased time due to pt leaning towards left side. Min A   LB Dressing Max A     Required Max A to don socks, thread LEs into undergarments seated EOB, Mod A for standing balance + Max A for clothing adjustment over hips, unable to safely release grasp from [employer] d/t dizziness  Dep; To colin/doff socks while sitting up on the EOB due to patient unable to complete due to HR and poor tolerance. Mod A   Bathing NT     Pt declined  Max A; To complete all bathing tasks with Max A and increased time. Mod A   Toileting Max A     Crawford Catheter  Mod A for standing balance + Max A for clothing adjustment over hips Dep; Crawford catheter present. Dep A to perform hygiene. Mod A   Bed Mobility  Rolling:  Min A  Repositioning:  Min A   Supine to Sit:  Min A    Sit to Supine:  Min A      Required Min A/Mod VCs for use of side-rail (pt sleeps in chair at baseline)  Rolling: Max A   Supine to Sit:  Max A  Sit to Supine: Max A;     To transfer from supine to sitting position with Max A and Max verbal prompting due to patient being impulsive.     SUP   Functional Transfers Sit to stand:  Min A  Stand to sit:  Min A       Able to stand briefly from EOB 3-4x  Pt ed re: safety/hand placement  Close SUP to scoot along EOB Sit to stand: Mod A  Stand to sit: Mod A; To perform transfers from sit to standing position with Mod A and Mod verbal prompting to improve hand placement. SUP   Functional Mobility Min A w/ WW     Only able to tolerate 2 steps along EOB  Mod A;    Pt took one step towards to [facility] with knees buckling and required to be transferred back into bed. SUP   Balance Sitting:       Static:  Close SUP    Dynamic:  Close SUP w/ functional ax     Standing:       Static:  Min A w/ FWW    Dynamic:  Min A w/ FWW w/ functional ax/mobility Sitting:       Static:  Min A    Dynamic:  Mod A     Standing:       Static:  Mod A w/ FWW    Dynamic: N/A due to knee buckling.      Activity Tolerance Tolerated Sitting:  EOB ~ 20 mins w/ functional ax  Tolerated Standing:  ~ 15-30 ~ 2x secs w/ functional ax Tolerated Sitting:  EOB ~ 15 mins w/ functional ax    Tolerated Standing:  ~ 1 min w/ functional ax      Visual/  Perceptual WFL  Glasses:  Reading   WFL     Hearing WFL  Hearing Aids  No  WFL        Hand dominance: Right     UE ROM:        RUE:     WFL                                          LUE:     WFL     Strength:        RUE:    grossly 4/5                                 LUE:    grossly 4/5      Strength:  WFL Malick UEs     Fine Motor Coordination:  WFL Malick UEs     Sensation:  Denies numbness or tingling Malick UEs  Tone:  WFL Malick UEs  Edema:  None Noted                            Comments: Upon arrival, patient supine in bed and agreeable to therapy session. Pt demonstrating limited understanding of education/techniques, requiring additional training / education.  At end of session, patient in semi-supine position in bed with HOB elevated, BUEs and BLEs elevated on pillows to reduce edema, call light and phone within reach, all lines and tubes intact. Pt would benefit from continued skilled OT to increase safety,  independence and quality of life. Treatment:  OT services provided: Facilitation of bed mobility, sitting balance at EOB (addressing posture, weightshifting and activity tolerance; incorporating light functional reaching), functional sit to stand transfers with skilled cuing on hand placement, posture, body mechanics and safety. Therapist facilitated self-care retraining: UB/LB self-care tasks (gown, socks) and seated grooming task while educating pt on modified techniques, posture, safety and energy conservation techniques. Skilled repositioning in bed addressing joint and skin integrity. Skilled monitoring of HR/O2 and pts response to treatment.  Pts HR became elevated to 205 and as low as 109.     Treatment Time In:  0915           Treatment Time Out:  0940     Treatment Charges: Mins Units   Ther Ex  26514       Manual Therapy 40603       Thera Activities 21098 14  1   ADL/Home Mgt 64159 16 1   Neuro Re-ed 94798       Group Therapy        Orthotic manage/training  14184       Non-Billable Time       Total Timed Treatment 30 [id number] [facility] [address]

## 2020-03-06 NOTE — PROGRESS NOTES
Hospital Medicine    Subjective:  Pt alert conversive feels better today      Current Facility-Administered Medications:     insulin glargine (LANTUS) injection vial 60 Units, 60 Units, Subcutaneous, Nightly, Norma Stephen DO    insulin lispro (HUMALOG) injection vial 0-12 Units, 0-12 Units, Subcutaneous, TID WC, Norma Stephen DO    insulin lispro (HUMALOG) injection vial 0-6 Units, 0-6 Units, Subcutaneous, Nightly, Norma Stephen DO    glucose (GLUTOSE) 40 % oral gel 15 g, 15 g, Oral, PRN, Norma Stephen DO    dextrose 50 % IV solution, 12.5 g, Intravenous, PRN, Norma Stephen DO    glucagon (rDNA) injection 1 mg, 1 mg, Intramuscular, PRN, Norma Stephen DO    dextrose 5 % solution, 100 mL/hr, Intravenous, PRN, Norma Stephen DO    benzocaine-menthol (CEPACOL SORE THROAT) lozenge 1 lozenge, 1 lozenge, Oral, Q2H PRN, Norma Stephen DO, 1 lozenge at 03/05/20 1455    oseltamivir (TAMIFLU) capsule 75 mg, 75 mg, Oral, Daily, Jenna Li MD, 75 mg at 03/06/20 6271    0.9 % sodium chloride infusion, , Intravenous, Continuous, Yael Lubin MD, Last Rate: 50 mL/hr at 03/05/20 1809    apixaban (ELIQUIS) tablet 5 mg, 5 mg, Oral, BID, Norma Stephen DO, 5 mg at 03/06/20 6188    aspirin EC tablet 81 mg, 81 mg, Oral, QAM, Norma Stephen DO, 81 mg at 03/06/20 8672    atorvastatin (LIPITOR) tablet 40 mg, 40 mg, Oral, Daily, Norma Stephen DO, 40 mg at 03/06/20 3783    carvedilol (COREG) tablet 25 mg, 25 mg, Oral, BID WC, Norma Stephen DO, 25 mg at 03/06/20 6550    famotidine (PEPCID) tablet 20 mg, 20 mg, Oral, Daily, Norma Stephen DO, 20 mg at 03/06/20 0830    fluticasone (FLONASE) 50 MCG/ACT nasal spray 2 spray, 2 spray, Nasal, QAM, Norma Stephen DO, 2 spray at 03/06/20 9744    hydrALAZINE (APRESOLINE) tablet 25 mg, 25 mg, Oral, TID, Norma Stephen DO, 25 mg at 03/06/20 0829    ipratropium-albuterol (DUONEB) nebulizer solution 3 mL, 1 vial, Inhalation, Q4H Hulon Douglas Stephen , 3 mL at 03/06/20 0847    levothyroxine (SYNTHROID) tablet 75 mcg, 75 mcg, Oral, Daily, Jacqueline Stephen DO, 75 mcg at 03/06/20 0830    sodium chloride flush 0.9 % injection 10 mL, 10 mL, Intravenous, 2 times per day, Jacqueline Stephen , 10 mL at 03/05/20 3837    sodium chloride flush 0.9 % injection 10 mL, 10 mL, Intravenous, PRN, Jacqueline Patric StephenDO    acetaminophen (TYLENOL) tablet 650 mg, 650 mg, Oral, Q6H PRN **OR** acetaminophen (TYLENOL) suppository 650 mg, 650 mg, Rectal, Q6H PRN, Jacquelinevesta Nyevgeny     polyethylene glycol (GLYCOLAX) packet 17 g, 17 g, Oral, Daily PRN, Jacqueline Patric Stephen DO    promethazine (PHENERGAN) tablet 12.5 mg, 12.5 mg, Oral, Q6H PRN **OR** ondansetron (ZOFRAN) injection 4 mg, 4 mg, Intravenous, Q6H PRN, Jacqueline Stephen DO    Objective:    BP (!) 152/75   Pulse 100   Temp 99.8 °F (37.7 °C) (Oral)   Resp 23   Ht 5' 3\" (1.6 m)   Wt 240 lb (108.9 kg)   SpO2 99%   BMI 42.51 kg/m²     Heart:  irreg  Lungs:  rhonchi  Abd: bowel sounds present, nontender, nondistended, no masses  Extrem:  Edema legs    CBC with Differential:    Lab Results   Component Value Date    WBC 9.3 03/05/2020    RBC 3.24 03/05/2020    HGB 8.7 03/05/2020    HCT 28.9 03/05/2020     03/05/2020    MCV 89.2 03/05/2020    MCH 26.9 03/05/2020    MCHC 30.1 03/05/2020    RDW 15.8 03/05/2020    LYMPHOPCT 7.1 03/04/2020    MONOPCT 7.1 03/04/2020    BASOPCT 0.2 03/04/2020    MONOSABS 0.58 03/04/2020    LYMPHSABS 0.58 03/04/2020    EOSABS 0.00 03/04/2020    BASOSABS 0.02 03/04/2020     CMP:    Lab Results   Component Value Date     03/06/2020    K 4.8 03/06/2020    K 5.7 03/04/2020     03/06/2020    CO2 23 03/06/2020    BUN 57 03/06/2020    CREATININE 1.8 03/06/2020    GFRAA 33 03/06/2020    LABGLOM 33 03/06/2020    GLUCOSE 94 03/06/2020    PROT 6.1 03/04/2020    LABALBU 3.5 03/04/2020    CALCIUM 8.1 03/06/2020    BILITOT 0.6 03/04/2020    ALKPHOS 101 03/04/2020    AST 76 03/04/2020    ALT 74 03/04/2020     Warfarin PT/INR:    Lab Results   Component Value Date    INR 1.7 02/25/2020    INR 1.2 11/20/2019    INR 1.1 10/06/2017    PROTIME 18.9 (H) 02/25/2020    PROTIME 13.2 (H) 11/20/2019    PROTIME 11.8 10/06/2017       Assessment:    Principal Problem:    Influenza A  Active Problems:    Permanent atrial fibrillation    HTN (hypertension)    Asthma    Hyperlipidemia    CAD (coronary artery disease)    Hypothyroidism    Gastroesophageal reflux disease    COPD (chronic obstructive pulmonary disease) ([facility])    Obstructive sleep apnea    Generalized OA    Chronic diastolic congestive heart failure, NYHA class 3 ([facility])    Mitral regurgitation    Type 2 diabetes mellitus, with long-term current use of insulin ([facility])    Chronic anticoagulation    CKD (chronic kidney disease) stage 4, GFR 15-29 ml/min ([facility])    Obesity  Resolved Problems:    * No resolved hospital problems.  *      Plan:  Cont tamiflu / cont ivf per renal / dc to rehab once off ivf and ok with everyone no precert needed per case mgmt / stop humalog with meals / cont ins ss / decrease lantus        Demi Bearded  9:04 AM  3/6/2020

## 2020-03-07 LAB
ANION GAP SERPL CALCULATED.3IONS-SCNC: 10 MMOL/L (ref 7–16)
BASOPHILS ABSOLUTE: 0.01 E9/L (ref 0–0.2)
BASOPHILS RELATIVE PERCENT: 0.2 % (ref 0–2)
BUN BLDV-MCNC: 46 MG/DL (ref 8–23)
CALCIUM SERPL-MCNC: 8.2 MG/DL (ref 8.6–10.2)
CHLORIDE BLD-SCNC: 105 MMOL/L (ref 98–107)
CO2: 23 MMOL/L (ref 22–29)
CREAT SERPL-MCNC: 1.7 MG/DL (ref 0.5–1)
EOSINOPHILS ABSOLUTE: 0.01 E9/L (ref 0.05–0.5)
EOSINOPHILS RELATIVE PERCENT: 0.2 % (ref 0–6)
GFR AFRICAN AMERICAN: 35
GFR NON-AFRICAN AMERICAN: 35 ML/MIN/1.73
GLUCOSE BLD-MCNC: 71 MG/DL (ref 74–99)
HCT VFR BLD CALC: 29.4 % (ref 34–48)
HEMOGLOBIN: 8.6 G/DL (ref 11.5–15.5)
IMMATURE GRANULOCYTES #: 0.04 E9/L
IMMATURE GRANULOCYTES %: 0.8 % (ref 0–5)
LYMPHOCYTES ABSOLUTE: 0.83 E9/L (ref 1.5–4)
LYMPHOCYTES RELATIVE PERCENT: 17 % (ref 20–42)
MCH RBC QN AUTO: 27.2 PG (ref 26–35)
MCHC RBC AUTO-ENTMCNC: 29.3 % (ref 32–34.5)
MCV RBC AUTO: 93 FL (ref 80–99.9)
METER GLUCOSE: 58 MG/DL (ref 74–99)
METER GLUCOSE: 72 MG/DL (ref 74–99)
METER GLUCOSE: 73 MG/DL (ref 74–99)
METER GLUCOSE: 86 MG/DL (ref 74–99)
METER GLUCOSE: 96 MG/DL (ref 74–99)
MONOCYTES ABSOLUTE: 0.69 E9/L (ref 0.1–0.95)
MONOCYTES RELATIVE PERCENT: 14.1 % (ref 2–12)
NEUTROPHILS ABSOLUTE: 3.3 E9/L (ref 1.8–7.3)
NEUTROPHILS RELATIVE PERCENT: 67.7 % (ref 43–80)
PDW BLD-RTO: 16 FL (ref 11.5–15)
PLATELET # BLD: 144 E9/L (ref 130–450)
PMV BLD AUTO: 11.7 FL (ref 7–12)
POTASSIUM SERPL-SCNC: 4.9 MMOL/L (ref 3.5–5)
RBC # BLD: 3.16 E12/L (ref 3.5–5.5)
SODIUM BLD-SCNC: 138 MMOL/L (ref 132–146)
TOTAL CK: 280 U/L (ref 20–180)
WBC # BLD: 4.9 E9/L (ref 4.5–11.5)

## 2020-03-07 PROCEDURE — 2060000000 HC ICU INTERMEDIATE R&B

## 2020-03-07 PROCEDURE — 80048 BASIC METABOLIC PNL TOTAL CA: CPT

## 2020-03-07 PROCEDURE — 2700000000 HC OXYGEN THERAPY PER DAY

## 2020-03-07 PROCEDURE — 82550 ASSAY OF CK (CPK): CPT

## 2020-03-07 PROCEDURE — 6370000000 HC RX 637 (ALT 250 FOR IP): Performed by: INTERNAL MEDICINE

## 2020-03-07 PROCEDURE — 2500000003 HC RX 250 WO HCPCS: Performed by: INTERNAL MEDICINE

## 2020-03-07 PROCEDURE — 85025 COMPLETE CBC W/AUTO DIFF WBC: CPT

## 2020-03-07 PROCEDURE — 82962 GLUCOSE BLOOD TEST: CPT

## 2020-03-07 PROCEDURE — 36415 COLL VENOUS BLD VENIPUNCTURE: CPT

## 2020-03-07 PROCEDURE — 94640 AIRWAY INHALATION TREATMENT: CPT

## 2020-03-07 PROCEDURE — 2580000003 HC RX 258: Performed by: INTERNAL MEDICINE

## 2020-03-07 RX ORDER — BUMETANIDE 0.25 MG/ML
2 INJECTION, SOLUTION INTRAMUSCULAR; INTRAVENOUS ONCE
Status: COMPLETED | OUTPATIENT
Start: 2020-03-07 | End: 2020-03-07

## 2020-03-07 RX ADMIN — DEXTROSE MONOHYDRATE 12.5 G: 25 INJECTION, SOLUTION INTRAVENOUS at 17:07

## 2020-03-07 RX ADMIN — IPRATROPIUM BROMIDE AND ALBUTEROL SULFATE 3 ML: 2.5; .5 SOLUTION RESPIRATORY (INHALATION) at 08:20

## 2020-03-07 RX ADMIN — APIXABAN 5 MG: 5 TABLET, FILM COATED ORAL at 21:02

## 2020-03-07 RX ADMIN — SODIUM CHLORIDE, PRESERVATIVE FREE 10 ML: 5 INJECTION INTRAVENOUS at 21:02

## 2020-03-07 RX ADMIN — ACETAMINOPHEN 650 MG: 325 TABLET ORAL at 17:50

## 2020-03-07 RX ADMIN — OSELTAMIVIR PHOSPHATE 75 MG: 75 CAPSULE ORAL at 22:15

## 2020-03-07 RX ADMIN — FLUTICASONE PROPIONATE 2 SPRAY: 50 SPRAY, METERED NASAL at 10:02

## 2020-03-07 RX ADMIN — HYDRALAZINE HYDROCHLORIDE 25 MG: 25 TABLET, FILM COATED ORAL at 21:02

## 2020-03-07 RX ADMIN — LEVOTHYROXINE SODIUM 75 MCG: 0.07 TABLET ORAL at 05:15

## 2020-03-07 RX ADMIN — ACETAMINOPHEN 650 MG: 325 TABLET ORAL at 05:15

## 2020-03-07 RX ADMIN — ATORVASTATIN CALCIUM 40 MG: 40 TABLET, FILM COATED ORAL at 10:01

## 2020-03-07 RX ADMIN — HYDRALAZINE HYDROCHLORIDE 25 MG: 25 TABLET, FILM COATED ORAL at 10:03

## 2020-03-07 RX ADMIN — CARVEDILOL 25 MG: 25 TABLET, FILM COATED ORAL at 10:01

## 2020-03-07 RX ADMIN — CARVEDILOL 25 MG: 25 TABLET, FILM COATED ORAL at 17:29

## 2020-03-07 RX ADMIN — BUMETANIDE 2 MG: 0.25 INJECTION INTRAMUSCULAR; INTRAVENOUS at 09:58

## 2020-03-07 RX ADMIN — SODIUM CHLORIDE, PRESERVATIVE FREE 10 ML: 5 INJECTION INTRAVENOUS at 10:04

## 2020-03-07 RX ADMIN — OSELTAMIVIR PHOSPHATE 75 MG: 75 CAPSULE ORAL at 10:01

## 2020-03-07 RX ADMIN — IPRATROPIUM BROMIDE AND ALBUTEROL SULFATE 3 ML: 2.5; .5 SOLUTION RESPIRATORY (INHALATION) at 17:34

## 2020-03-07 RX ADMIN — FAMOTIDINE 20 MG: 20 TABLET, FILM COATED ORAL at 10:02

## 2020-03-07 RX ADMIN — ASPIRIN 81 MG: 81 TABLET, COATED ORAL at 10:01

## 2020-03-07 RX ADMIN — APIXABAN 5 MG: 5 TABLET, FILM COATED ORAL at 10:00

## 2020-03-07 RX ADMIN — IPRATROPIUM BROMIDE AND ALBUTEROL SULFATE 3 ML: 2.5; .5 SOLUTION RESPIRATORY (INHALATION) at 12:10

## 2020-03-07 ASSESSMENT — PAIN DESCRIPTION - DESCRIPTORS: DESCRIPTORS: ACHING

## 2020-03-07 ASSESSMENT — PAIN SCALES - WONG BAKER
WONGBAKER_NUMERICALRESPONSE: 6
WONGBAKER_NUMERICALRESPONSE: 6

## 2020-03-07 ASSESSMENT — PAIN SCALES - GENERAL
PAINLEVEL_OUTOF10: 0
PAINLEVEL_OUTOF10: 0
PAINLEVEL_OUTOF10: 8
PAINLEVEL_OUTOF10: 8
PAINLEVEL_OUTOF10: 9

## 2020-03-07 ASSESSMENT — PAIN DESCRIPTION - FREQUENCY: FREQUENCY: CONTINUOUS

## 2020-03-07 ASSESSMENT — PAIN DESCRIPTION - PAIN TYPE
TYPE: CHRONIC PAIN
TYPE: CHRONIC PAIN

## 2020-03-07 ASSESSMENT — PAIN DESCRIPTION - LOCATION: LOCATION: GENERALIZED

## 2020-03-07 NOTE — PROGRESS NOTES
0-6 Units Subcutaneous Nightly    oseltamivir  75 mg Oral BID    apixaban  5 mg Oral BID    aspirin  81 mg Oral QAM    atorvastatin  40 mg Oral Daily    carvedilol  25 mg Oral BID WC    famotidine  20 mg Oral Daily    fluticasone  2 spray Nasal QAM    hydrALAZINE  25 mg Oral TID    ipratropium-albuterol  1 vial Inhalation Q4H WA    levothyroxine  75 mcg Oral Daily    sodium chloride flush  10 mL Intravenous 2 times per day        dextrose      sodium chloride 50 mL/hr at 03/06/20 1543       Meds prn:     glucose, dextrose, glucagon (rDNA), dextrose, benzocaine-menthol, sodium chloride flush, acetaminophen **OR** acetaminophen, polyethylene glycol, promethazine **OR** ondansetron    Meds prior to admission:     No current facility-administered medications on file prior to encounter. Current Outpatient Medications on File Prior to Encounter   Medication Sig Dispense Refill    carvedilol (COREG) 25 MG tablet Take 1 tablet by mouth 2 times daily (with meals) 60 tablet 5    insulin glargine (BASAGLAR KWIKPEN) 100 UNIT/ML injection pen Inject 70 Units into the skin nightly 6 pen 5    insulin lispro (HUMALOG) 100 UNIT/ML injection vial Inject 15 Units into the skin 3 times daily (before meals) 1 vial 3    hydrALAZINE (APRESOLINE) 25 MG tablet Take 1 tablet by mouth 3 times daily 90 tablet 0    atorvastatin (LIPITOR) 40 MG tablet Take 1 tablet by mouth daily 30 tablet 5    levothyroxine (LEVOTHROID) 75 MCG tablet Take 1 tablet by mouth daily 90 tablet 5    apixaban (ELIQUIS) 5 MG TABS tablet Take 1 tablet by mouth 2 times daily 60 tablet 5    albuterol sulfate  (90 Base) MCG/ACT inhaler Inhale 2 puffs into the lungs every 6 hours as needed for Wheezing 3 Inhaler 4    famotidine (PEPCID) 20 MG tablet Take 1 tablet by mouth daily 60 tablet 5    nitroGLYCERIN (NITROSTAT) 0.4 MG SL tablet Place 1 tablet under the tongue every 5 minutes as needed for Chest pain up to max of 3 total doses.  If no relief after 1 dose, call 911. (Patient not taking: Reported on 2/20/2020) 75 tablet 4    acetaminophen (TYLENOL) 325 MG tablet Take 2 tablets by mouth every 4 hours as needed for Fever (For temp greater than 100.5 F (38 C)) 120 tablet 3    ipratropium-albuterol (DUONEB) 0.5-2.5 (3) MG/3ML SOLN nebulizer solution Inhale 3 mLs into the lungs every 4 hours (while awake) 360 mL 0    fluticasone (FLONASE) 50 MCG/ACT nasal spray 2 sprays by Nasal route every morning 1 Bottle 5    loperamide (IMODIUM) 2 MG capsule Take 1 capsule by mouth 4 times daily as needed for Diarrhea (Patient not taking: Reported on 2/20/2020) 120 capsule 5    aspirin 81 MG tablet Take 81 mg by mouth every morning          Allergies: Iv [iodides]; Codeine; Cymbalta [duloxetine hcl]; Gabapentin; Hydrocodone; Morphine; Pradaxa [dabigatran etexilate mesylate];  Oxycontin [oxycodone hcl]; and Penicillins          ROS:     General: no fever, chills   Heent: no nasal congestion, sore throat   Resp: no cough, sob , hemoptysis  Cardiac: no cp , le edema, palpitations  Gi: no nausea, vomiting, melena, abd pain, hematemesis  Gu: no hematuria, dysuria   Neruo: no numbness, weakness, headache, blurry vision   Endocrine:  no h/o dm  Derm: no rash , petechia  Heme: no epistaxis, bruising  All other sx negative     Physical Exam:      Patient Vitals for the past 24 hrs:   BP Temp Temp src Pulse Resp SpO2   03/07/20 0232 128/76 98.6 °F (37 °C) Temporal 78 28 98 %   03/06/20 2000 122/72 98.4 °F (36.9 °C) Temporal 81 26 99 %   03/06/20 1609 -- -- -- -- -- 99 %   03/06/20 1530 (!) 131/59 98.3 °F (36.8 °C) Temporal 110 22 100 %   03/06/20 1320 -- -- -- -- 22 (!) 85 %   03/06/20 1214 135/72 99.9 °F (37.7 °C) Temporal 87 22 100 %   03/06/20 0849 -- -- -- -- -- 99 %         Intake/Output Summary (Last 24 hours) at 3/7/2020 0831  Last data filed at 3/7/2020 0554  Gross per 24 hour   Intake 780.91 ml   Output 1025 ml   Net -244.09 ml       General: Patient somnolent, arouses easily  Head: normocephalic, atraumatic   Neck: supple, no jvd  Cardiovascular: regular rate and rhythm, no murmurs, gallops, or rubs   Respiratory: diffuse wheezes and crackles  Gastrointestinal: soft, nontender, nondistended, no hepatosplenomegaly  Ext: tr edema  Neuro: somnolent, arouses easily  Skin: dry, no rash   Back: nontender    Data:    Recent Labs     03/04/20  1614 03/05/20  0503 03/07/20  0420   WBC 8.2 9.3 4.9   HGB 9.0* 8.7* 8.6*   HCT 29.9* 28.9* 29.4*   MCV 88.5 89.2 93.0    133 144       Recent Labs     03/05/20  0502 03/06/20  0421 03/07/20  0420    136 138   K 5.5* 4.8 4.9    103 105   CO2 21* 23 23   CREATININE 2.2* 1.8* 1.7*   BUN 69* 57* 46*   LABGLOM 26 33 35   GLUCOSE 221* 94 71*   CALCIUM 8.6 8.1* 8.2*       Vit D, 25-Hydroxy   Date Value Ref Range Status   08/23/2019 48 30 - 100 ng/mL Final     Comment:     <20 ng/mL. ........... Brianna Robertson Deficient  20-30 ng/mL. ......... Brianna Robertson Insufficient   ng/mL. ........ Brianna Robertson Sufficient  >100 ng/mL. .......... Brianna Robertson Toxic         PTH   Date Value Ref Range Status   01/04/2018 90 (H) 15 - 65 pg/mL Final       Recent Labs     03/04/20  1614   ALT 74*   AST 76*   ALKPHOS 101   BILITOT 0.6       Recent Labs     03/04/20  1614   LABALBU 3.5       No results found for: FERRITIN, IRON, TIBC    No results found for: DWAEIDOX68    No results found for: FOLATE      Lab Results   Component Value Date    COLORU Yellow 03/04/2020    NITRU Negative 03/04/2020    GLUCOSEU 500 03/04/2020    KETUA Negative 03/04/2020    UROBILINOGEN 0.2 03/04/2020    BILIRUBINUR Negative 03/04/2020       No results found for: JARETT MURPHY MACREATRATIO, OSMOU        Assessment and Plan:    1.) Acute on Chronic Renal Failure - resolving   CKD stage IV,  Creatinine Baseline 1.7-.2.3  Hold lisinopril   Cr now improved to baseline; will continue gentle hydration  Monitor I's/O's, urine output     2.) HFrEF  Now with worsening shortness of breath  On GDMT  Continue GDMT per

## 2020-03-07 NOTE — PROGRESS NOTES
Internal Medicine   Progress Note    Admit Date: 3/4/2020  Hospital day:    Hospital Day: 4  SUBJECTIVE:  No new acute problems overnight. Doing OK. No chest pain or shortness of breath. No nausea or vomiting. No fever. No abdominal pain. OBJECTIVE:     /77   Pulse 78   Temp 98.6 °F (37 °C) (Temporal)   Resp 28   Ht 5' 3\" (1.6 m)   Wt 240 lb (108.9 kg)   SpO2 98%   BMI 42.51 kg/m²   Patient Vitals for the past 24 hrs:   BP Temp Temp src Pulse Resp SpO2   03/07/20 0958 124/77 -- -- 78 -- --   03/07/20 0232 128/76 98.6 °F (37 °C) Temporal 78 28 98 %   03/06/20 2000 122/72 98.4 °F (36.9 °C) Temporal 81 26 99 %   03/06/20 1609 -- -- -- -- -- 99 %   03/06/20 1530 (!) 131/59 98.3 °F (36.8 °C) Temporal 110 22 100 %   03/06/20 1320 -- -- -- -- 22 (!) 85 %   03/06/20 1214 135/72 99.9 °F (37.7 °C) Temporal 87 22 100 %     24HR INTAKE/OUTPUT:      Intake/Output Summary (Last 24 hours) at 3/7/2020 1102  Last data filed at 3/7/2020 1003  Gross per 24 hour   Intake 670.91 ml   Output 1025 ml   Net -354.09 ml      GENERAL: Alert, breathing easily, not in apparent acute distress. LUNGS:  No obvious increased work of breathing, clear to auscultation bilaterally. CARDIOVASCULAR:  S1 and S2 regular to auscultate. ABDOMEN:  Soft, non-tender. Bowel sounds present    Data    Recent Labs     03/04/20  1614 03/05/20  0503 03/07/20  0420   WBC 8.2 9.3 4.9   HGB 9.0* 8.7* 8.6*    133 144     Recent Labs     03/05/20  0502 03/06/20  0421 03/07/20  0420    136 138   K 5.5* 4.8 4.9    103 105   CO2 21* 23 23   BUN 69* 57* 46*   CREATININE 2.2* 1.8* 1.7*   GLUCOSE 221* 94 71*     Recent Labs     03/04/20  1614   AST 76*   ALT 74*   BILITOT 0.6   ALKPHOS 101     Recent Labs     03/04/20  1614 03/05/20  0502 03/06/20  0421 03/07/20  0420   CKTOTAL 1,201* 940* 423* 280*   TROPONINI 0.08*  --   --   --      No results for input(s): BNP in the last 72 hours.   Recent Labs     03/04/20  1614   PROT 6.1* No results for input(s): APTT in the last 72 hours.   Recent Labs     03/04/20  1614   LIPASE 17     Lab Results   Component Value Date    TSH 1.730 08/23/2019     Medications     dextrose        insulin glargine  60 Units Subcutaneous Nightly    insulin lispro  0-12 Units Subcutaneous TID WC    insulin lispro  0-6 Units Subcutaneous Nightly    oseltamivir  75 mg Oral BID    apixaban  5 mg Oral BID    aspirin  81 mg Oral QAM    atorvastatin  40 mg Oral Daily    carvedilol  25 mg Oral BID WC    famotidine  20 mg Oral Daily    fluticasone  2 spray Nasal QAM    hydrALAZINE  25 mg Oral TID    ipratropium-albuterol  1 vial Inhalation Q4H WA    levothyroxine  75 mcg Oral Daily    sodium chloride flush  10 mL Intravenous 2 times per day      DIET CARDIAC;    ASSESSMENT/PLAN:  Influenza A on Oseltamavir  Coronary artery disease  Chronic diastolic congestive heart failure, NYHA class 3  Chronic kidney disease stage 4, GFR 15-29 ml/min  Chronic obstructive pulmonary disease  Gastroesophageal reflux disease  Generalized OA  Asthma  Hypertension  Hyperlipidemia  Hypothyroidism  Mitral regurgitation  Obesity  Obstructive sleep apnea  Permanent atrial fibrillation Chronic anticoagulation  Type 2 diabetes mellitus, with long-term current use of insulin      PLAN:  Cont tamiflu  renal following  Monitor blood sugars and vitals          Electronically signed by Tani Bush MD on 3/7/2020 at 11:02 AM

## 2020-03-08 LAB
ANION GAP SERPL CALCULATED.3IONS-SCNC: 13 MMOL/L (ref 7–16)
ANISOCYTOSIS: ABNORMAL
BASOPHILS ABSOLUTE: 0.03 E9/L (ref 0–0.2)
BASOPHILS RELATIVE PERCENT: 0.9 % (ref 0–2)
BUN BLDV-MCNC: 49 MG/DL (ref 8–23)
CALCIUM SERPL-MCNC: 8.3 MG/DL (ref 8.6–10.2)
CHLORIDE BLD-SCNC: 102 MMOL/L (ref 98–107)
CO2: 24 MMOL/L (ref 22–29)
CREAT SERPL-MCNC: 1.8 MG/DL (ref 0.5–1)
EOSINOPHILS ABSOLUTE: 0 E9/L (ref 0.05–0.5)
EOSINOPHILS RELATIVE PERCENT: 0.3 % (ref 0–6)
GFR AFRICAN AMERICAN: 33
GFR NON-AFRICAN AMERICAN: 33 ML/MIN/1.73
GLUCOSE BLD-MCNC: 76 MG/DL (ref 74–99)
HCT VFR BLD CALC: 28.5 % (ref 34–48)
HEMOGLOBIN: 8.4 G/DL (ref 11.5–15.5)
LYMPHOCYTES ABSOLUTE: 1.19 E9/L (ref 1.5–4)
LYMPHOCYTES RELATIVE PERCENT: 32.7 % (ref 20–42)
MCH RBC QN AUTO: 27.5 PG (ref 26–35)
MCHC RBC AUTO-ENTMCNC: 29.5 % (ref 32–34.5)
MCV RBC AUTO: 93.4 FL (ref 80–99.9)
METER GLUCOSE: 115 MG/DL (ref 74–99)
METER GLUCOSE: 138 MG/DL (ref 74–99)
METER GLUCOSE: 56 MG/DL (ref 74–99)
METER GLUCOSE: 81 MG/DL (ref 74–99)
METER GLUCOSE: 95 MG/DL (ref 74–99)
MONOCYTES ABSOLUTE: 0.29 E9/L (ref 0.1–0.95)
MONOCYTES RELATIVE PERCENT: 8 % (ref 2–12)
NEUTROPHILS ABSOLUTE: 2.09 E9/L (ref 1.8–7.3)
NEUTROPHILS RELATIVE PERCENT: 58.4 % (ref 43–80)
OVALOCYTES: ABNORMAL
PDW BLD-RTO: 15.9 FL (ref 11.5–15)
PLATELET # BLD: 133 E9/L (ref 130–450)
PMV BLD AUTO: 11.3 FL (ref 7–12)
POIKILOCYTES: ABNORMAL
POLYCHROMASIA: ABNORMAL
POTASSIUM SERPL-SCNC: 4.7 MMOL/L (ref 3.5–5)
RBC # BLD: 3.05 E12/L (ref 3.5–5.5)
SODIUM BLD-SCNC: 139 MMOL/L (ref 132–146)
TARGET CELLS: ABNORMAL
TOTAL CK: 143 U/L (ref 20–180)
WBC # BLD: 3.6 E9/L (ref 4.5–11.5)

## 2020-03-08 PROCEDURE — 36415 COLL VENOUS BLD VENIPUNCTURE: CPT

## 2020-03-08 PROCEDURE — 2700000000 HC OXYGEN THERAPY PER DAY

## 2020-03-08 PROCEDURE — 82550 ASSAY OF CK (CPK): CPT

## 2020-03-08 PROCEDURE — 6370000000 HC RX 637 (ALT 250 FOR IP): Performed by: INTERNAL MEDICINE

## 2020-03-08 PROCEDURE — 82962 GLUCOSE BLOOD TEST: CPT

## 2020-03-08 PROCEDURE — 85025 COMPLETE CBC W/AUTO DIFF WBC: CPT

## 2020-03-08 PROCEDURE — 2580000003 HC RX 258: Performed by: INTERNAL MEDICINE

## 2020-03-08 PROCEDURE — 94640 AIRWAY INHALATION TREATMENT: CPT

## 2020-03-08 PROCEDURE — 80048 BASIC METABOLIC PNL TOTAL CA: CPT

## 2020-03-08 PROCEDURE — 2060000000 HC ICU INTERMEDIATE R&B

## 2020-03-08 PROCEDURE — 94760 N-INVAS EAR/PLS OXIMETRY 1: CPT

## 2020-03-08 RX ADMIN — DEXTROSE MONOHYDRATE 12.5 G: 25 INJECTION, SOLUTION INTRAVENOUS at 20:00

## 2020-03-08 RX ADMIN — HYDRALAZINE HYDROCHLORIDE 25 MG: 25 TABLET, FILM COATED ORAL at 14:06

## 2020-03-08 RX ADMIN — CARVEDILOL 25 MG: 25 TABLET, FILM COATED ORAL at 16:44

## 2020-03-08 RX ADMIN — IPRATROPIUM BROMIDE AND ALBUTEROL SULFATE 3 ML: 2.5; .5 SOLUTION RESPIRATORY (INHALATION) at 17:26

## 2020-03-08 RX ADMIN — FAMOTIDINE 20 MG: 20 TABLET, FILM COATED ORAL at 08:32

## 2020-03-08 RX ADMIN — ATORVASTATIN CALCIUM 40 MG: 40 TABLET, FILM COATED ORAL at 08:32

## 2020-03-08 RX ADMIN — APIXABAN 5 MG: 5 TABLET, FILM COATED ORAL at 08:32

## 2020-03-08 RX ADMIN — IPRATROPIUM BROMIDE AND ALBUTEROL SULFATE 3 ML: 2.5; .5 SOLUTION RESPIRATORY (INHALATION) at 09:41

## 2020-03-08 RX ADMIN — SODIUM CHLORIDE, PRESERVATIVE FREE 10 ML: 5 INJECTION INTRAVENOUS at 21:36

## 2020-03-08 RX ADMIN — BENZOCAINE AND MENTHOL 1 LOZENGE: 15; 3.6 LOZENGE ORAL at 06:27

## 2020-03-08 RX ADMIN — LEVOTHYROXINE SODIUM 75 MCG: 0.07 TABLET ORAL at 06:26

## 2020-03-08 RX ADMIN — HYDRALAZINE HYDROCHLORIDE 25 MG: 25 TABLET, FILM COATED ORAL at 21:36

## 2020-03-08 RX ADMIN — ASPIRIN 81 MG: 81 TABLET, COATED ORAL at 08:32

## 2020-03-08 RX ADMIN — IPRATROPIUM BROMIDE AND ALBUTEROL SULFATE 3 ML: 2.5; .5 SOLUTION RESPIRATORY (INHALATION) at 13:50

## 2020-03-08 RX ADMIN — SODIUM CHLORIDE, PRESERVATIVE FREE 10 ML: 5 INJECTION INTRAVENOUS at 08:32

## 2020-03-08 RX ADMIN — HYDRALAZINE HYDROCHLORIDE 25 MG: 25 TABLET, FILM COATED ORAL at 08:32

## 2020-03-08 RX ADMIN — APIXABAN 5 MG: 5 TABLET, FILM COATED ORAL at 21:37

## 2020-03-08 RX ADMIN — OSELTAMIVIR PHOSPHATE 75 MG: 75 CAPSULE ORAL at 08:32

## 2020-03-08 RX ADMIN — CARVEDILOL 25 MG: 25 TABLET, FILM COATED ORAL at 08:32

## 2020-03-08 RX ADMIN — IPRATROPIUM BROMIDE AND ALBUTEROL SULFATE 3 ML: 2.5; .5 SOLUTION RESPIRATORY (INHALATION) at 22:32

## 2020-03-08 RX ADMIN — FLUTICASONE PROPIONATE 2 SPRAY: 50 SPRAY, METERED NASAL at 08:33

## 2020-03-08 RX ADMIN — OSELTAMIVIR PHOSPHATE 75 MG: 75 CAPSULE ORAL at 22:08

## 2020-03-08 ASSESSMENT — PAIN SCALES - GENERAL
PAINLEVEL_OUTOF10: 0

## 2020-03-08 NOTE — PROGRESS NOTES
Nephrology Progress Note        CC:   ckd     HPI:   The pt is a 75 yo female with a pmh of htn, afib, cad, hyperlipdiemia, ciopd, hypothyroidism, gerd, djd, HFpEF, dm, pulm htn, ckd 4 with baseline cr of 1.7-2.3. Who presented after being found on the floor by family. She had fallen in the bathroom at night and was there until 7 am when daughter found her. She just here and discharged on 3/2 for arf on ckd 4. She was hypovolemic and overdiuresed. She was given ivf and diuretics were held. Her cr peaked at 3.4 and was 1.7 when she left. Now her labs show na 136 k 5.5 co2 21 bun 69 cr 2.2, wbc 9.3, hgb 8.7, plt 133, ca 8.6. she was started on bumex 1 mg iv bid. resp panel showing influenza a and she was started on tamiflu, maxipime, doxy.      3/6[de-identified] c/o frequent cough, SOB    3/7: Complains of shortness of breath; states worse this morning    3/8: c/o SOB        Patient Active Problem List   Diagnosis    Permanent atrial fibrillation    HTN (hypertension)    Asthma    Hyperlipidemia    CAD (coronary artery disease)    Hypothyroidism    Anxiety    Gastroesophageal reflux disease    [facility] [address]    Vitamin D deficiency    Obstructive sleep apnea    Peripheral neuropathy    Generalized OA    LVH (left ventricular hypertrophy)    Pulmonary hypertension (HCC)    Valvular heart disease    Chronic heart failure with preserved ejection fraction (HFpEF) ([facility])    Chronic diastolic congestive heart failure, NYHA class 3 ([facility])    Mitral regurgitation    Type 2 diabetes mellitus, with long-term current use of insulin ([facility])    Chronic anticoagulation    Nonrheumatic mitral valve regurgitation    Red blood cell antibody positive    CKD (chronic kidney disease) stage 4, GFR 15-29 ml/min ([facility])    DARVIN (acute kidney injury) ([facility] [address]    Obesity    Influenza A       Meds:     insulin glargine  60 Units Subcutaneous Nightly    insulin lispro  0-12 Units Subcutaneous TID WC    insulin lispro  0-6 Units Subcutaneous Nightly    oseltamivir  75 mg Oral BID    apixaban  5 mg Oral BID    aspirin  81 mg Oral QAM    atorvastatin  40 mg Oral Daily    carvedilol  25 mg Oral BID     famotidine  20 mg Oral Daily    fluticasone  2 spray Nasal QAM    hydrALAZINE  25 mg Oral TID    ipratropium-albuterol  1 vial Inhalation Q4H WA    levothyroxine  75 mcg Oral Daily    sodium chloride flush  10 mL Intravenous 2 times per day        dextrose         Meds prn:     glucose, dextrose, glucagon (rDNA), dextrose, benzocaine-menthol, sodium chloride flush, acetaminophen **OR** acetaminophen, polyethylene glycol, promethazine **OR** ondansetron    Meds prior to admission:     No current facility-administered medications on file prior to encounter. Current Outpatient Medications on File Prior to Encounter   Medication Sig Dispense Refill    carvedilol (COREG) 25 MG tablet Take 1 tablet by mouth 2 times daily (with meals) 60 tablet 5    insulin glargine (BASAGLAR KWIKPEN) 100 UNIT/ML injection pen Inject 70 Units into the skin nightly 6 pen 5    insulin lispro (HUMALOG) 100 UNIT/ML injection vial Inject 15 Units into the skin 3 times daily (before meals) 1 vial 3    hydrALAZINE (APRESOLINE) 25 MG tablet Take 1 tablet by mouth 3 times daily 90 tablet 0    atorvastatin (LIPITOR) 40 MG tablet Take 1 tablet by mouth daily 30 tablet 5    levothyroxine (LEVOTHROID) 75 MCG tablet Take 1 tablet by mouth daily 90 tablet 5    apixaban (ELIQUIS) 5 MG TABS tablet Take 1 tablet by mouth 2 times daily 60 tablet 5    albuterol sulfate  (90 Base) MCG/ACT inhaler Inhale 2 puffs into the lungs every 6 hours as needed for Wheezing 3 Inhaler 4    famotidine (PEPCID) 20 MG tablet Take 1 tablet by mouth daily 60 tablet 5    nitroGLYCERIN (NITROSTAT) 0.4 MG SL tablet Place 1 tablet under the tongue every 5 minutes as needed for Chest pain up to max of 3 total doses.  If no relief after 1 dose, call on tamiflu maxipime demetrius Coto MD

## 2020-03-08 NOTE — PROGRESS NOTES
tomography sections of the abdomen with sagittal and coronal MPR reconstructions were obtained from the top of the diaphragm to the pelvis. The visualized portions of the abdomen reveal: FINDINGS: LUNG BASES: Right lower lobe pulmonary consolidation likely representing pneumonia. Minimal pulmonary opacities also seen in the left lower lobe. The heart is enlarged. Trace bilateral pleural effusions. No pericardial effusion. LIVER: Unremarkable. GALLBLADDER:Unremarkable SPLEEN:Unremarkable. ADRENALS: 2.4 cm low-density mass in the left adrenal gland consistent with an adenoma. KIDNEYS:Unremarkable. PANCREAS:Unremarkable. BOWEL: Mild distal colonic diverticulosis without diverticulitis. No bowel wall thickening or obstruction. APPENDIX: Surgically absent. BLADDER:Unremarkable. REPRODUCTIVE ORGANS: Status post hysterectomy. VASCULATURE:Moderate calcified atherosclerosis seen in the abdominal aorta. No aneurysm. LYMPH NODES:Unremarkable. BONES: Our scribe bones decompressive laminectomies at L4-5 and L5-S1 with posterior interbody fusion hardware at L4-5. Prominent loss of disc height at L5-S1. MISCELLANEOUS:No additional finding. 1. Right lower lobe pulmonary consolidation, likely pneumonia. Minimal opacities in the left lower lobe may represent pneumonia as well. 2. Cardiomegaly. Trace bilateral pleural effusions. 3. No acute abnormality seen in the abdomen or the pelvis. 4. Left adrenal adenoma. Xr Femur Left (min 2 Views)    Result Date: 3/4/2020  4 views of the left hip, femur, and. 2 views of the left knee, tibia, fibula and ankle. FINDINGS: There is diffuse bone demineralization. There is osteophytosis and eburnation of the articulating surfaces of the sacroiliac joint, hip, knee and ankle. Metallic clips are identified along the medial soft tissues throughout the left lower extremity. There is no evidence of acute displaced cortical disruption or dislocation. No acute fracture is identified. Osteoarthritis. Osteopenia. Xr Tibia Fibula Left (2 Views)    Result Date: 3/4/2020  4 views of the left hip, femur, and. 2 views of the left knee, tibia, fibula and ankle. FINDINGS: There is diffuse bone demineralization. There is osteophytosis and eburnation of the articulating surfaces of the sacroiliac joint, hip, knee and ankle. Metallic clips are identified along the medial soft tissues throughout the left lower extremity. There is no evidence of acute displaced cortical disruption or dislocation. No acute fracture is identified. Osteoarthritis. Osteopenia. Ct Head Wo Contrast    Result Date: 3/4/2020  Patient MRN:  [id number] : 1945 Age: 76 years Gender: Female Order Date:  3/4/2020 4:00 PM EXAM: CT HEAD WO CONTRAST INDICATION:  AMS AMS  COMPARISON: CT head without contrast, 2018 FINDINGS: PARENCHYMA:No mass effect, edema or hemorrhage. Mild-to-moderate volume loss in mild-to-moderate chronic microvascular ischemic changes are both in the range that is typical for age. VENTRICLES:No hydrocephalus. No extra-axial fluid. CALVARIUM:The calvarium is intact without fracture or focal lesion. SINUSES: Near complete opacification of the right sphenoid sinus. Minimal mucoperiosteal thickening ethmoid sinuses. The mastoids are clear. 1. No acute intracranial abnormality. 2. Prominent mucosal thickening in the right sphenoid sinus. Ct Cervical Spine Wo Contrast    Result Date: 3/4/2020  Patient MRN:  [id number] : 1945 Age: 76 years Gender: Female Order Date:  3/4/2020 4:00 PM EXAM: CT CERVICAL SPINE WO CONTRAST INDICATION:  AMS AMS COMPARISON: CT of the cervical spine, 2018 Multiple CT sections were obtained with sagittal and coronal MPR reconstructions. FINDINGS: Evaluation is somewhat limited due to patient motion artifact. BONES: No fracture or joint dislocation. Vertebral body heights are preserved. No bony destructive lesion.  DISC SPACES: Severe loss of disc height at C4-5 and status fall trauma injury fracture. Reviewed images of CT of abdomen and pelvis performed same day. FINDINGS: Patient previous fusion for the lower lumbar spine L4-L5 levels bilaterally with laminectomy. No acute fractures seen in the pelvic bones. SI joints since and obturator foramen is appears intact. They are also seen in the CAT scan examination of the pelvis. The right and left femoral heads are in proper position in relation to the respective acetabular cavities . No acute fractures seen in the right and left hips are. Acute displaced fractures of the pelvic bones, right and left hip joints.       Medications     dextrose        insulin glargine  60 Units Subcutaneous Nightly    insulin lispro  0-12 Units Subcutaneous TID WC    insulin lispro  0-6 Units Subcutaneous Nightly    oseltamivir  75 mg Oral BID    apixaban  5 mg Oral BID    aspirin  81 mg Oral QAM    atorvastatin  40 mg Oral Daily    carvedilol  25 mg Oral BID WC    famotidine  20 mg Oral Daily    fluticasone  2 spray Nasal QAM    hydrALAZINE  25 mg Oral TID    ipratropium-albuterol  1 vial Inhalation Q4H WA    levothyroxine  75 mcg Oral Daily    sodium chloride flush  10 mL Intravenous 2 times per day      DIET CARDIAC;    ASSESSMENT/PLAN:  Influenza A on Oseltamavir  Coronary artery disease  Chronic diastolic congestive heart failure, NYHA class 3  Chronic kidney disease stage 4, GFR 15-29 ml/min  Chronic obstructive pulmonary disease  Gastroesophageal reflux disease  Generalized OA  Asthma  Hypertension  Hyperlipidemia  Hypothyroidism  Mitral regurgitation  Obesity  Obstructive sleep apnea  Permanent atrial fibrillation Chronic anticoagulation  Type 2 diabetes mellitus, with long-term current use of insulin   ID and renal following  Monitor blood sugars and vitals    Electronically signed by Sheela Tim MD on 3/8/2020 at 12:02 PM

## 2020-03-09 VITALS
HEART RATE: 83 BPM | OXYGEN SATURATION: 98 % | DIASTOLIC BLOOD PRESSURE: 57 MMHG | RESPIRATION RATE: 18 BRPM | BODY MASS INDEX: 42.52 KG/M2 | HEIGHT: 63 IN | SYSTOLIC BLOOD PRESSURE: 116 MMHG | WEIGHT: 240 LBS | TEMPERATURE: 98 F

## 2020-03-09 LAB
ANION GAP SERPL CALCULATED.3IONS-SCNC: 8 MMOL/L (ref 7–16)
ANISOCYTOSIS: ABNORMAL
BASOPHILS ABSOLUTE: 0 E9/L (ref 0–0.2)
BASOPHILS RELATIVE PERCENT: 0.2 % (ref 0–2)
BLOOD CULTURE, ROUTINE: NORMAL
BUN BLDV-MCNC: 43 MG/DL (ref 8–23)
CALCIUM SERPL-MCNC: 8.5 MG/DL (ref 8.6–10.2)
CHLORIDE BLD-SCNC: 105 MMOL/L (ref 98–107)
CO2: 26 MMOL/L (ref 22–29)
CREAT SERPL-MCNC: 1.7 MG/DL (ref 0.5–1)
CULTURE, BLOOD 2: NORMAL
CULTURE, RESPIRATORY: ABNORMAL
CULTURE, RESPIRATORY: ABNORMAL
EOSINOPHILS ABSOLUTE: 0 E9/L (ref 0.05–0.5)
EOSINOPHILS RELATIVE PERCENT: 0.4 % (ref 0–6)
GFR AFRICAN AMERICAN: 35
GFR NON-AFRICAN AMERICAN: 35 ML/MIN/1.73
GLUCOSE BLD-MCNC: 110 MG/DL (ref 74–99)
HCT VFR BLD CALC: 28.8 % (ref 34–48)
HEMOGLOBIN: 8.3 G/DL (ref 11.5–15.5)
HYPOCHROMIA: ABNORMAL
LYMPHOCYTES ABSOLUTE: 0.52 E9/L (ref 1.5–4)
LYMPHOCYTES RELATIVE PERCENT: 11.3 % (ref 20–42)
MCH RBC QN AUTO: 26.9 PG (ref 26–35)
MCHC RBC AUTO-ENTMCNC: 28.8 % (ref 32–34.5)
MCV RBC AUTO: 93.2 FL (ref 80–99.9)
METER GLUCOSE: 108 MG/DL (ref 74–99)
METER GLUCOSE: 117 MG/DL (ref 74–99)
METER GLUCOSE: 183 MG/DL (ref 74–99)
METER GLUCOSE: 184 MG/DL (ref 74–99)
MONOCYTES ABSOLUTE: 0.28 E9/L (ref 0.1–0.95)
MONOCYTES RELATIVE PERCENT: 6.1 % (ref 2–12)
NEUTROPHILS ABSOLUTE: 3.9 E9/L (ref 1.8–7.3)
NEUTROPHILS RELATIVE PERCENT: 82.6 % (ref 43–80)
ORGANISM: ABNORMAL
OVALOCYTES: ABNORMAL
PDW BLD-RTO: 15.8 FL (ref 11.5–15)
PLATELET # BLD: 143 E9/L (ref 130–450)
PMV BLD AUTO: 11.5 FL (ref 7–12)
POIKILOCYTES: ABNORMAL
POLYCHROMASIA: ABNORMAL
POTASSIUM SERPL-SCNC: 5 MMOL/L (ref 3.5–5)
RBC # BLD: 3.09 E12/L (ref 3.5–5.5)
SMEAR, RESPIRATORY: ABNORMAL
SODIUM BLD-SCNC: 139 MMOL/L (ref 132–146)
TOTAL CK: 114 U/L (ref 20–180)
WBC # BLD: 4.7 E9/L (ref 4.5–11.5)

## 2020-03-09 PROCEDURE — 2700000000 HC OXYGEN THERAPY PER DAY

## 2020-03-09 PROCEDURE — 85025 COMPLETE CBC W/AUTO DIFF WBC: CPT

## 2020-03-09 PROCEDURE — 6370000000 HC RX 637 (ALT 250 FOR IP): Performed by: INTERNAL MEDICINE

## 2020-03-09 PROCEDURE — 97535 SELF CARE MNGMENT TRAINING: CPT

## 2020-03-09 PROCEDURE — 82962 GLUCOSE BLOOD TEST: CPT

## 2020-03-09 PROCEDURE — 97530 THERAPEUTIC ACTIVITIES: CPT

## 2020-03-09 PROCEDURE — 36415 COLL VENOUS BLD VENIPUNCTURE: CPT

## 2020-03-09 PROCEDURE — 80048 BASIC METABOLIC PNL TOTAL CA: CPT

## 2020-03-09 PROCEDURE — 94640 AIRWAY INHALATION TREATMENT: CPT

## 2020-03-09 PROCEDURE — 82550 ASSAY OF CK (CPK): CPT

## 2020-03-09 PROCEDURE — 2580000003 HC RX 258: Performed by: INTERNAL MEDICINE

## 2020-03-09 PROCEDURE — 6360000002 HC RX W HCPCS: Performed by: INTERNAL MEDICINE

## 2020-03-09 RX ORDER — POLYETHYLENE GLYCOL 3350 17 G/17G
17 POWDER, FOR SOLUTION ORAL DAILY PRN
Qty: 527 G | Refills: 1 | COMMUNITY
Start: 2020-03-09 | End: 2020-04-08

## 2020-03-09 RX ORDER — INSULIN GLARGINE 100 [IU]/ML
60 INJECTION, SOLUTION SUBCUTANEOUS NIGHTLY
Qty: 1 VIAL | Refills: 3
Start: 2020-03-09 | End: 2020-04-07 | Stop reason: SDUPTHER

## 2020-03-09 RX ORDER — BUMETANIDE 1 MG/1
1 TABLET ORAL 2 TIMES DAILY
Status: DISCONTINUED | OUTPATIENT
Start: 2020-03-09 | End: 2020-03-09 | Stop reason: HOSPADM

## 2020-03-09 RX ORDER — BUMETANIDE 1 MG/1
1 TABLET ORAL 2 TIMES DAILY
Qty: 30 TABLET | Refills: 3
Start: 2020-03-09

## 2020-03-09 RX ADMIN — HYDRALAZINE HYDROCHLORIDE 25 MG: 25 TABLET, FILM COATED ORAL at 09:17

## 2020-03-09 RX ADMIN — IPRATROPIUM BROMIDE AND ALBUTEROL SULFATE 3 ML: 2.5; .5 SOLUTION RESPIRATORY (INHALATION) at 16:00

## 2020-03-09 RX ADMIN — IPRATROPIUM BROMIDE AND ALBUTEROL SULFATE 3 ML: 2.5; .5 SOLUTION RESPIRATORY (INHALATION) at 11:30

## 2020-03-09 RX ADMIN — ACETAMINOPHEN 650 MG: 325 TABLET ORAL at 02:29

## 2020-03-09 RX ADMIN — OSELTAMIVIR PHOSPHATE 75 MG: 75 CAPSULE ORAL at 09:17

## 2020-03-09 RX ADMIN — INSULIN LISPRO 2 UNITS: 100 INJECTION, SOLUTION INTRAVENOUS; SUBCUTANEOUS at 16:15

## 2020-03-09 RX ADMIN — ATORVASTATIN CALCIUM 40 MG: 40 TABLET, FILM COATED ORAL at 09:17

## 2020-03-09 RX ADMIN — CARVEDILOL 25 MG: 25 TABLET, FILM COATED ORAL at 16:15

## 2020-03-09 RX ADMIN — FAMOTIDINE 20 MG: 20 TABLET, FILM COATED ORAL at 09:17

## 2020-03-09 RX ADMIN — HYDRALAZINE HYDROCHLORIDE 25 MG: 25 TABLET, FILM COATED ORAL at 13:27

## 2020-03-09 RX ADMIN — INSULIN LISPRO 2 UNITS: 100 INJECTION, SOLUTION INTRAVENOUS; SUBCUTANEOUS at 11:46

## 2020-03-09 RX ADMIN — SODIUM CHLORIDE, PRESERVATIVE FREE 10 ML: 5 INJECTION INTRAVENOUS at 09:17

## 2020-03-09 RX ADMIN — ASPIRIN 81 MG: 81 TABLET, COATED ORAL at 09:17

## 2020-03-09 RX ADMIN — APIXABAN 5 MG: 5 TABLET, FILM COATED ORAL at 09:17

## 2020-03-09 RX ADMIN — LEVOTHYROXINE SODIUM 75 MCG: 0.07 TABLET ORAL at 06:33

## 2020-03-09 RX ADMIN — BUMETANIDE 1 MG: 1 TABLET ORAL at 16:15

## 2020-03-09 RX ADMIN — ONDANSETRON 4 MG: 2 INJECTION INTRAMUSCULAR; INTRAVENOUS at 00:32

## 2020-03-09 RX ADMIN — CARVEDILOL 25 MG: 25 TABLET, FILM COATED ORAL at 09:16

## 2020-03-09 ASSESSMENT — PAIN SCALES - GENERAL
PAINLEVEL_OUTOF10: 0
PAINLEVEL_OUTOF10: 0
PAINLEVEL_OUTOF10: 4
PAINLEVEL_OUTOF10: 0

## 2020-03-09 NOTE — PROGRESS NOTES
[facility] Medicine    Subjective:  Pt alert conversive sitting up in chair      Current Facility-Administered Medications:     bumetanide (BUMEX) tablet 1 mg, 1 mg, Oral, BID, Alisha Coto MD    insulin glargine (LANTUS) injection vial 60 Units, 60 Units, Subcutaneous, Nightly, Hal Stephen DO    insulin lispro (HUMALOG) injection vial 0-12 Units, 0-12 Units, Subcutaneous, TID WC, Hal Stephen DO, 2 Units at 03/09/20 1146    insulin lispro (HUMALOG) injection vial 0-6 Units, 0-6 Units, Subcutaneous, Nightly, Hal Stephen DO    oseltamivir (TAMIFLU) capsule 75 mg, 75 mg, Oral, BID, Zachariah Andre MD, 75 mg at 03/09/20 5102    glucose (GLUTOSE) 40 % oral gel 15 g, 15 g, Oral, PRN, Hal Stephen DO    dextrose 50 % IV solution, 12.5 g, Intravenous, PRN, Hal Stephen DO, 12.5 g at 03/08/20 2000    glucagon (rDNA) injection 1 mg, 1 mg, Intramuscular, PRN, Hal Stephen DO    dextrose 5 % solution, 100 mL/hr, Intravenous, PRN, Hal Stephen DO    benzocaine-menthol (CEPACOL SORE THROAT) lozenge 1 lozenge, 1 lozenge, Oral, Q2H PRN, Hal Stephen DO, 1 lozenge at 03/08/20 0384    apixaban (ELIQUIS) tablet 5 mg, 5 mg, Oral, BID, Hal Stephen DO, 5 mg at 03/09/20 7006    aspirin EC tablet 81 mg, 81 mg, Oral, QAM, Hal Stephen DO, 81 mg at 03/09/20 5975    atorvastatin (LIPITOR) tablet 40 mg, 40 mg, Oral, Daily, Hal Stephen DO, 40 mg at 03/09/20 3240    carvedilol (COREG) tablet 25 mg, 25 mg, Oral, BID WC, Hal Stephen DO, 25 mg at 03/09/20 0777    famotidine (PEPCID) tablet 20 mg, 20 mg, Oral, Daily, Hal Stephen DO, 20 mg at 03/09/20 9192    fluticasone (FLONASE) 50 MCG/ACT nasal spray 2 spray, 2 spray, Nasal, QAM, Hal Stephen DO, 2 spray at 03/08/20 1243    hydrALAZINE (APRESOLINE) tablet 25 mg, 25 mg, Oral, TID, Hal Stephen DO, 25 mg at 03/09/20 0917    ipratropium-albuterol (DUONEB) nebulizer solution 3 mL, 1 vial,

## 2020-03-09 NOTE — PROGRESS NOTES
standing pt reported that she couldn't maintain standing so she returned to the chair. STS completed a two more times following reassurance and education on transfer technique. Same result. Pt declined returning to bed and stated she was more comfortable sitting in chair. Pt was left with tray table in front of her and call button within reach. Treatment:  Patient practiced and was instructed in the following treatment:    · Transfer Technique: VCs for hand placement, foot placement, sequencing and safety. STS completed 3x. PLAN:    Patient is making limited progress towards established goals. Will continue with current POC.       Time in  1245  Time out  1255    Total Treatment Time  10 minutes     CPT codes:  [] Gait training 05929 0 minutes  [] Manual therapy 33456 0 minutes  [x] Therapeutic activities 20201 10 minutes  [] Therapeutic exercises 96162 0 minutes  [] Neuromuscular reeducation 11453 0 minutes    Mainor Miller PT, DPT  License [id number]

## 2020-03-09 NOTE — CARE COORDINATION
Discharge to [facility] arranged for [phone] with Johan sr. [address] notified of discharge time. Daughter notified of discharge time.

## 2020-03-09 NOTE — PLAN OF CARE
Problem: Falls - Risk of:  Goal: Will remain free from falls  Description: Will remain free from falls  Outcome: Met This Shift  Goal: Absence of physical injury  Description: Absence of physical injury  Outcome: Met This Shift

## 2020-03-09 NOTE — PROGRESS NOTES
Occupational Therapy  OT BEDSIDE TREATMENT NOTE      Date:3/9/2020  Patient Name: Leonie Collet  MRN: [id number]  : 1945  Room: [address] [place] [address]     Referring Physician:  Kaitlynn Shipley DO     Evaluating OT:  TOM Motta, OTR/L #[id number]     AM-PAC Daily Activity Raw Score:  10/24  Recommended Adaptive Equipment:  TBD as pt progresses      Reason for Admission:  Pt was readmitted after going home alone after recent D/C, began coughing and became SOB. Lana Journey to the floor and remained on the ground for an extended time     Diagnosis:  PNA, Influenza      Pertinent Medical History:  Extensive History including MI, A Fib, DM, Back Surgery  Recent admission 20 - 3/2/20     Precautions:  Falls  Droplet Precautions - (+) Influenza  WBAT Malick LEs  Cardiac Diet  Hx of Right Foot Drop/AFO     Home Living: Pt lives Alone in a single-level apartment with 2 ROSALINA and 1 HR/s. Bed/bath on the main floor. Pt sleeps in a chair. Bathroom setup:  Tub-Shower, Standard Commode   Equipment owned:  Extended tub bench, Rollator, W/C, Sock Aid, Reacher     Available Family Assist:  Dtr provides assist PRN.  Has Aides Service M-F 10AM-2:30 - assist w/ bathing, Home mgmt. Pt is home alone for most of the day     Prior Level of Function:  Pt was IND with Dressing, Toileting, SUP for bathing/tub transfer, IND with Light IADLs (Light meal prep/home mgmt), Transfers and Mobility using Rollator for household ambulation. Yonas Nunez reported     Pain Level:  No c/o pain this date. Additional Complaints:  Lethargy, Severe Dizziness. Pt having difficulty keeping eyes open while seated EOB.     Cognition: A & O x 4 with increased time to respond.    Able to Follow Multi-Step Commands w/ Occasional min VCs d/t lethargy              Memory:  good (-)              Sequencing:  good (-)              Problem solving:  good (-)              Judgement/safety:  fair  (+)  Additional Comments:  Pt was pleasant, cooperative. Very impulsive. Functional Assessment:    Initial Eval Status  Date: 3-5-20 Treatment Status  Date:  3/9/20 Short Term/Long Term Goals  Treatment frequency: PRN 2-4 x/week  1-2 weeks   Feeding SUP/Set up     Able to feed self, required assist to open some containers on tray SBA; To complete self feeding with verbal prompting to increase intake. IND   Grooming Min A/Set up     Required min A for ax while seated EOB d/t severity of dizziness and lethargy, unsafe to attempt to ambulate to or stand for ax Mod a;    Mod A with Min verbal prompting to wash face and hands while sitting up in the chair. Min A for steadying, set up for task  Standing At The Sink   UB Dressing Mod A/Set up     Required Mod A to thread UEs into garment, wrap garment around back while seated EOB, Close SUP for safety w/ sitting balance d/t lethargy/dizziness  Mod A; To colin/doff gown with Mod A and increased time due to poor attention to tasks. Min A   LB Dressing Max A     Required Max A to don socks, thread LEs into undergarments seated EOB, Mod A for standing balance + Max A for clothing adjustment over hips, unable to safely release grasp from [facility] d/t dizziness  Dep; To colin/doff socks while sitting up on the EOB due to patient unable to complete due to HR and poor tolerance. Mod A   Bathing NT     Pt declined  Max A; To complete all bathing tasks with Max A and increased time. Mod A   Toileting Max A     Crawford Catheter  Mod A for standing balance + Max A for clothing adjustment over hips Dep; Crawford catheter present. Dep A to perform hygiene.   Mod A   Bed Mobility  Rolling:  Min A  Repositioning:  Min A   Supine to Sit:  Min A    Sit to Supine:  Min A      Required Min A/Mod VCs for use of side-rail (pt sleeps in chair at baseline)  Rolling:  N/T  Supine to Sit:  N/T  Sit to Supine: N/T;    Pt sitting up on the EOB with PTA when entering room.     SUP   Functional Transfers Sit to benefit from continued skilled OT to increase safety,  independence and quality of life. Treatment:  OT services provided: Facilitation of bed mobility, sitting balance at EOB (addressing posture, weightshifting and activity tolerance; incorporating light functional reaching), functional sit to stand transfers with skilled cuing on hand placement, posture, body mechanics and safety. Therapist facilitated self-care retraining: UB/LB self-care tasks (gown, socks) and seated grooming task while educating pt on modified techniques, posture, safety and energy conservation techniques. Skilled repositioning in bed addressing joint and skin integrity.  Skilled monitoring of HR/O2 and pts response to treatment.      Treatment Time In:  0815           Treatment Time Out:  0840     Treatment Charges: Mins Units   Ther Ex  26405       Manual Therapy 16043       Thera Activities 93603 11 1   ADL/Home Mgt 18047 14 1   Neuro Re-ed 15217       Group Therapy        Orthotic manage/training  27017       Non-Billable Time       Total Timed Treatment 25 [id number] [facility] [address]

## 2020-03-11 ENCOUNTER — TELEPHONE (OUTPATIENT)
Dept: CARDIOLOGY CLINIC | Age: 75
End: 2020-03-11

## 2020-03-11 NOTE — TELEPHONE ENCOUNTER
I called pt. To schedule a hospital f/u but there was no answer so I called her daughter Ed Penny who said that pt. Is at [facility] for rehab and will be there for a few weeks, she will call to schedule upon discharge.

## 2020-03-21 NOTE — DISCHARGE SUMMARY
[address]                  [address] [facility],  [facility]                               DISCHARGE SUMMARY    PATIENT NAME: Jasvir Zuniga                   :        1945  MED REC NO:   [id number]                            ROOM:       9326  ACCOUNT NO:   [de-identified]                           ADMIT DATE: 2020  PROVIDER:     Frandy Valera DO                  DISCHARGE DATE:  2020      DISCHARGE DIAGNOSES:  Influenza infection, diabetes mellitus type 2, on  insulin, CKD IV, hypertension, COPD, chronic anticoagulation, chronic  diastolic CHF, mitral regurgitation, chronic atrial fibrillation,  coronary artery disease, obstructive sleep apnea. HOSPITAL COURSE:  The patient is a 58-year-old black female who  presented to the emergency room with complaints of shortness of breath. She was found to have influenza infection. She was admitted to the  hospital.  She was seen by Renal, Cardiology and Infectious Disease. Her status though slowly stabilized and she was eventually discharged to  skilled nursing facility on 2020 in stable condition. DISCHARGE MEDICATIONS:  As per discharge med rec which include Bumex 1  mg b.i.d., Lantus insulin 60 units nightly, GlycoLax daily p.r.n.,  Humalog sliding scale as directed, Tylenol p.r.n., albuterol inhaler  p.r.n., Eliquis 5 mg b.i.d., aspirin 81 mg daily, Lipitor 40 mg daily,  Coreg 25 mg b.i.d., Pepcid 20 mg daily, Flonase 2 sprays each nostril  daily, hydralazine 25 mg t.i.d., DuoNeb nebulizer q.4h. while awake,  Synthroid 75 mcg daily, Imodium p.r.n., nitroglycerin sublingual p.r.n.  and Tamiflu 75 mg b.i.d. for 4 days.         Tao Mar DO    D: 2020 13:38:59       T: 2020 13:49:27     MM/S_WEEK_  Job#: [id number]     Doc#: [id number]    CC:  Toro Hammond DO

## 2020-04-04 PROBLEM — J10.1 INFLUENZA A: Status: RESOLVED | Noted: 2020-03-05 | Resolved: 2020-04-04

## 2020-04-06 NOTE — TELEPHONE ENCOUNTER
Name of Medication(s) Requested:  basaglar  fluticasone    Pharmacy [facility]ed:   [facility]    Medication(s) pended? [x] Yes  [] No    Last Appointment:  11/26/2019    Future appts:  Future Appointments   Date Time Provider Chichi Meade   5/14/2020 10:00 AM Ike Mckee MD [facility]   6/16/2020 10:45 AM [facility]   8/25/2020  1:30 PM [facility] US RM 1 SEYZ Shaji   8/25/2020  2:00 PM Ankur Manuel MD V[facility]        Does patient need call back?   [] Yes  [x] No

## 2020-04-07 RX ORDER — FLUTICASONE PROPIONATE 50 MCG
2 SPRAY, SUSPENSION (ML) NASAL EVERY MORNING
Qty: 1 BOTTLE | Refills: 5 | Status: SHIPPED | OUTPATIENT
Start: 2020-04-07

## 2020-04-07 RX ORDER — INSULIN GLARGINE 100 [IU]/ML
60 INJECTION, SOLUTION SUBCUTANEOUS NIGHTLY
Qty: 1 VIAL | Refills: 3 | Status: SHIPPED | OUTPATIENT
Start: 2020-04-07

## 2020-04-23 ENCOUNTER — TELEPHONE (OUTPATIENT)
Dept: PRIMARY CARE CLINIC | Age: 75
End: 2020-04-23

## 2023-02-08 NOTE — PROGRESS NOTES
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 Status: Symptomatic. Persistent. s/p Laser retinopexy since 1976
s/p PRP last 10/03/14
s/p Avastin x 58  last 12/09/2022 Plan: Exam and OCT reveal CME with an ERM OD (569 microns, from 747 microns, from 364 microns, from 694 microns). An IVFA from 10/21/2022 demonstrated leakage superior to the superotemporal arcade vessel. Fundus Photos from 10/21/2022 demonstrated pigmentation. Recommend Avastin. R/B/A discussed with patient. The risks of Avastin were discussed, including off-label use and compounding pharmacy risks, and the patient elects to proceed with Avastin. After 2% Subconjunctival anesthesia & Betadine prep, 1.25mg of Avastin was injected in the eye. Cold compress and Tylenol suggested for pain if needed. Thanks, Cuong Lance Return in 4-6 weeks for re eval CME, OCT [facility] Nephrology Progress Note        CC:   ckd     HPI:   The pt is a 75 yo female with a pmh of htn, afib, cad, hyperlipdiemia, ciopd, hypothyroidism, gerd, djd, HFpEF, dm, pulm htn, ckd 4 with baseline cr of 1.7-2.3. Who presented after being found on the floor by family. She had fallen in the bathroom at night and was there until 7 am when daughter found her. She just here and discharged on 3/2 for arf on ckd 4. She was hypovolemic and overdiuresed. She was given ivf and diuretics were held. Her cr peaked at 3.4 and was 1.7 when she left. Now her labs show na 136 k 5.5 co2 21 bun 69 cr 2.2, wbc 9.3, hgb 8.7, plt 133, ca 8.6. she was started on bumex 1 mg iv bid. resp panel showing influenza a and she was started on tamiflu, maxipime, doxy.      3/6[de-identified] c/o frequent cough, SOB    3/7: Complains of shortness of breath; states worse this morning    3/8: c/o SOB    3/9: No new c/o; SOB        Patient Active Problem List   Diagnosis    Permanent atrial fibrillation    HTN (hypertension)    Asthma    Hyperlipidemia    CAD (coronary artery disease)    Hypothyroidism    Anxiety    Gastroesophageal reflux disease    COPD [facility]chronic obstructive pulmonary disease) [facility] [address]    Vitamin D deficiency    Obstructive sleep apnea    Peripheral neuropathy    Generalized OA    LVH (left ventricular hypertrophy)    Pulmonary hypertension (HCC)    Valvular heart disease    Chronic heart failure with preserved ejection fraction (HFpEF) ([facility])    Chronic diastolic congestive heart failure, NYHA class 3 ([facility])    Mitral regurgitation    Type 2 diabetes mellitus, with long-term current use of insulin ([facility])    Chronic anticoagulation    Nonrheumatic mitral valve regurgitation    Red blood cell antibody positive    CKD (chronic kidney disease) stage 4, GFR 15-29 ml/min ([facility])    DARVIN (acute kidney injury) ([address]    Obesity    Influenza A       Meds:     insulin glargine  60 Units Subcutaneous Nightly    insulin lispro  0-12 Units 133 143       Recent Labs     03/07/20  0420 03/08/20  0441 03/09/20  0429    139 139   K 4.9 4.7 5.0    102 105   CO2 23 24 26   CREATININE 1.7* 1.8* 1.7*   BUN 46* 49* 43*   LABGLOM 35 33 35   GLUCOSE 71* 76 110*   CALCIUM 8.2* 8.3* 8.5*       Vit D, 25-Hydroxy   Date Value Ref Range Status   08/23/2019 48 30 - 100 ng/mL Final     Comment:     <20 ng/mL. ........... Gracie Marten Deficient  20-30 ng/mL. ......... Gracie Marten Insufficient   ng/mL. ........ Gracie Marten Sufficient  >100 ng/mL. .......... Gracie Marten Toxic         PTH   Date Value Ref Range Status   01/04/2018 90 (H) 15 - 65 pg/mL Final       No results for input(s): ALT, AST, ALKPHOS, BILITOT, BILIDIR in the last 72 hours. No results for input(s): LABALBU in the last 72 hours. No results found for: FERRITIN, IRON, TIBC    No results found for: [id number]    No results found for: FOLATE      Lab Results   Component Value Date    COLORU Yellow 03/04/2020    NITRU Negative 03/04/2020    GLUCOSEU 500 03/04/2020    KETUA Negative 03/04/2020    UROBILINOGEN 0.2 03/04/2020    BILIRUBINUR Negative 03/04/2020       No results found for: JEFFREY, CREURRAN, MACREATRATIO, OSMOU        Assessment and Plan:    1.) Acute on Chronic Renal Failure - resolving   CKD stage IV,  Creatinine Baseline 1.7-.2.3  Hold lisinopril   Cr now improved to baseline  Monitor I's/O's, urine output     2.) HFrEF  Now with worsening shortness of breath  On GDMT  Continue GDMT per primary, hold lisinopril  Daily weights and strict I's/O's  Will resume bumex at lower dose     3.) Chronic A fib  Rate presently controlled  Continue Eliquis      4.) IDDM2  Continue to monitor BG per primary  Cover hyperglycemia with sliding scale insulin  Carb control/Renal diet     5.) CAD  S/p CABG 1997, s/p PCI 2013  Continue ASA, Lipitor      6.) Non-anion gap acidosis  Secondary to CKD, resolved     7.) Hyperkalemia  Renal diet  K level controlled    8. Sp fall  May be due to hypovolemia inf a  Hydrate gently  dw dr Raphael Oneil    9. resp  resp panel with Influenza A  Started on tamiflu maxipime doxy     Discharge planning    Rony Martinez MD

## 2023-11-16 NOTE — ED NOTES
Bed: 12  Expected date:   Expected time:   Means of arrival:   Comments:  LANES Bolivar [place], RN  09/18/19 [address] Type: Entire Wound

## 2024-03-12 NOTE — TELEPHONE ENCOUNTER
Mariola Luong with [facility] called.     Needs script for leg rest for wheel chair    Please fax to [facility] @ [phone]
normal...